# Patient Record
Sex: MALE | Race: BLACK OR AFRICAN AMERICAN | NOT HISPANIC OR LATINO | Employment: FULL TIME | URBAN - METROPOLITAN AREA
[De-identification: names, ages, dates, MRNs, and addresses within clinical notes are randomized per-mention and may not be internally consistent; named-entity substitution may affect disease eponyms.]

---

## 2020-02-07 ENCOUNTER — OFFICE VISIT (OUTPATIENT)
Dept: CARDIOLOGY | Facility: CLINIC | Age: 55
End: 2020-02-07

## 2020-02-07 VITALS
OXYGEN SATURATION: 99 % | DIASTOLIC BLOOD PRESSURE: 70 MMHG | SYSTOLIC BLOOD PRESSURE: 118 MMHG | RESPIRATION RATE: 20 BRPM | HEIGHT: 69 IN | BODY MASS INDEX: 46.65 KG/M2 | WEIGHT: 315 LBS

## 2020-02-07 DIAGNOSIS — I48.0 PAROXYSMAL ATRIAL FIBRILLATION WITH RAPID VENTRICULAR RESPONSE (HCC): ICD-10-CM

## 2020-02-07 DIAGNOSIS — I10 ESSENTIAL HYPERTENSION: ICD-10-CM

## 2020-02-07 DIAGNOSIS — G47.33 OBSTRUCTIVE SLEEP APNEA OF ADULT: ICD-10-CM

## 2020-02-07 DIAGNOSIS — E66.01 MORBIDLY OBESE (HCC): ICD-10-CM

## 2020-02-07 DIAGNOSIS — R06.02 EXERTIONAL SHORTNESS OF BREATH: Primary | ICD-10-CM

## 2020-02-07 PROBLEM — I48.91 ATRIAL FIBRILLATION: Status: ACTIVE | Noted: 2020-02-07

## 2020-02-07 PROBLEM — E66.813 OBESITY, CLASS III, BMI 40-49.9 (MORBID OBESITY): Status: ACTIVE | Noted: 2019-01-22

## 2020-02-07 PROBLEM — J45.909 ASTHMA: Status: ACTIVE | Noted: 2020-02-07

## 2020-02-07 PROBLEM — E78.5 HYPERLIPIDEMIA: Status: ACTIVE | Noted: 2020-02-07

## 2020-02-07 PROBLEM — M17.12 PRIMARY OSTEOARTHRITIS OF LEFT KNEE: Status: ACTIVE | Noted: 2018-10-23

## 2020-02-07 PROBLEM — K21.9 GERD (GASTROESOPHAGEAL REFLUX DISEASE): Status: ACTIVE | Noted: 2020-02-07

## 2020-02-07 PROCEDURE — 99205 OFFICE O/P NEW HI 60 MIN: CPT | Performed by: INTERNAL MEDICINE

## 2020-02-07 PROCEDURE — 93000 ELECTROCARDIOGRAM COMPLETE: CPT | Performed by: INTERNAL MEDICINE

## 2020-02-07 RX ORDER — LISINOPRIL 40 MG/1
40 TABLET ORAL DAILY
COMMUNITY
End: 2020-02-07

## 2020-02-07 RX ORDER — FLECAINIDE ACETATE 100 MG/1
100 TABLET ORAL 2 TIMES DAILY
Qty: 180 TABLET | Refills: 3 | Status: SHIPPED | OUTPATIENT
Start: 2020-02-07 | End: 2020-02-16 | Stop reason: HOSPADM

## 2020-02-07 RX ORDER — FLUTICASONE PROPIONATE 50 MCG
1 SPRAY, SUSPENSION (ML) NASAL DAILY
COMMUNITY
Start: 2020-01-12 | End: 2020-02-16 | Stop reason: HOSPADM

## 2020-02-07 RX ORDER — OXYCODONE AND ACETAMINOPHEN 10; 325 MG/1; MG/1
TABLET ORAL
COMMUNITY
Start: 2019-12-08 | End: 2020-06-10

## 2020-02-07 RX ORDER — MONTELUKAST SODIUM 10 MG/1
10 TABLET ORAL DAILY
COMMUNITY
Start: 2020-01-12 | End: 2020-02-16 | Stop reason: HOSPADM

## 2020-02-07 RX ORDER — CARVEDILOL 12.5 MG/1
12.5 TABLET ORAL
COMMUNITY
End: 2020-02-07

## 2020-02-07 RX ORDER — HYDROCODONE BITARTRATE AND ACETAMINOPHEN 10; 325 MG/1; MG/1
TABLET ORAL
COMMUNITY
Start: 2020-01-08 | End: 2023-01-25 | Stop reason: ALTCHOICE

## 2020-02-07 RX ORDER — METOPROLOL TARTRATE 100 MG/1
100 TABLET ORAL 2 TIMES DAILY
Qty: 180 TABLET | Refills: 3 | Status: SHIPPED | OUTPATIENT
Start: 2020-02-07 | End: 2020-02-16 | Stop reason: HOSPADM

## 2020-02-07 RX ORDER — ATORVASTATIN CALCIUM 40 MG/1
40 TABLET, FILM COATED ORAL NIGHTLY
COMMUNITY

## 2020-02-07 NOTE — PROGRESS NOTES
PATIENTINFORMATION    Date of Office Visit: 2020  Encounter Provider: Gavin Luke MD  Place of Service: [facility] CARDIOLOGY  Patient Name: Milton Moody  : 1965    Subjective:     Encounter Date:2020      Patient ID: Milton Moody is a 54 y.o. male.    Chief Complaint   Patient presents with   • Shortness of Breath   • Irregular Heart Beat   • Atrial Fibrillation     HPI  Mr. Moody is a 54 years old man with past medical history of symptomatic paroxysmal atrial fibrillation diagnosed about 4 years ago.  He used to follow-up at [facility] cardiology.  He has had direct-current cardioversion only once in the past .  He was a started flecainide about 2 years ago.  He reports staying in sinus rhythm for the most part.  He has had multiple episodes of palpitation and atrial fibrillation over the past 4 months.  He admits not being compliant with his medications including flecainide and he was found out to be in A. fib today.   For the past several weeks he has had progressively worsening shortness of breath particularly during exertion and just walking half a block or taking 2 flights of stairs making very short winded and has to stop to take a break.  He admits getting sweaty and very tired.  This happens every time he is in A. fib as well.  He denied any chest discomfort, extremity swelling, orthopnea or PND.  He denied past diagnosis of heart failure or admission for heart failure.  So far he was admitted only one time for atrial fibrillation and no admissions since then.  He denied any prior diabetes, stroke, bleeding from anybody site.  He has been taking lisinopril and Coreg for hypertension as well.  He wears CPAP for obstructive sleep apnea.  He is morbidly obese and he has tried to lose weight is to no avail in the past.  He admits gaining significant amount over the past few months.  He does not regularly exercise.  He  admits to being more  sedentary since he took leave of absence from [facility] because of shoulder injuries several months ago.  He wanted to switch to [facility] because of a longer appointment he was given at [facility] that is not happy about.    ROS   All systems reviewed.  He had right shoulder surgery and knee replacement.  Joint pains intermittently.  Was negative review of systems    Past Medical History:   Diagnosis Date   • Abnormal ECG    • Asthma    • Atrial fibrillation (CMS/HCC)    • Chest pain    • Hyperlipidemia    • Hypertension    • Palpitations    • Rapid heart rate    • Sleep apnea        Past Surgical History:   Procedure Laterality Date   • KNEE SURGERY Left 2019   • SHOULDER ROTATOR CUFF REPAIR Right 01/2020       Social History     Socioeconomic History   • Marital status:      Spouse name: Not on file   • Number of children: Not on file   • Years of education: Not on file   • Highest education level: Not on file   Tobacco Use   • Smoking status: Light Tobacco Smoker     Packs/day: 0.10     Years: 3.00     Pack years: 0.30     Types: Cigarettes     Start date: 2/7/2017   • Smokeless tobacco: Current User     Types: Snuff   Substance and Sexual Activity   • Alcohol use: Yes     Alcohol/week: 12.0 standard drinks     Types: 12 Cans of beer per week     Frequency: 2-4 times a month     Drinks per session: 10 or more     Binge frequency: Monthly   • Drug use: Not Currently     Types: Marijuana   • Sexual activity: Not Currently     Partners: Female       Family History   Problem Relation Age of Onset   • Hypertension Mother    • Hypertension Sister            ECG 12 Lead  Date/Time: 2/7/2020 10:50 AM  Performed by: Gavin Luke MD  Authorized by: Gavin Luke MD   Comparison: compared with previous ECG from 2/6/2020  Similar to previous ECG  Rhythm: sinus rhythm and atrial fibrillation  Conduction: conduction normal  ST Segments: ST segments normal  T Waves: T waves normal  QRS  "axis: normal  Other findings: non-specific ST-T wave changes    Clinical impression: abnormal EKG               Objective:     /70 (BP Location: Left arm, Patient Position: Sitting, Cuff Size: Large Adult)   Resp 20   Ht 175.3 cm (69\")   Wt (!) 165 kg (363 lb 6.4 oz)   SpO2 99%   BMI 53.66 kg/m²  Body mass index is 53.66 kg/m².     Physical Exam   Constitutional: He is oriented to person, place, and time. He appears well-developed and well-nourished. No distress.   Morbidly obese   HENT:   Head: Normocephalic and atraumatic.   Eyes: Pupils are equal, round, and reactive to light. EOM are normal.   Neck: Normal range of motion. Neck supple. No thyromegaly present.   Cardiovascular: Normal heart sounds and intact distal pulses. An irregularly irregular rhythm present. Tachycardia present. Exam reveals no gallop and no friction rub.   No murmur heard.  Pulmonary/Chest: Effort normal and breath sounds normal. No respiratory distress. He has no wheezes. He has no rales. He exhibits no tenderness.   Abdominal: Soft. Bowel sounds are normal. He exhibits no distension. There is no guarding.   Musculoskeletal: Normal range of motion. He exhibits no edema or deformity.   Neurological: He is alert and oriented to person, place, and time. He has normal reflexes. No cranial nerve deficit.   Skin: Skin is warm and dry. No rash noted. He is not diaphoretic.   Psychiatric: He has a normal mood and affect. Judgment normal.       Review Of Data: I have personally obtained and reviewed documents from PCPs office visit and labs      Assessment/Plan:         Exertional shortness of breath    Paroxysmal atrial fibrillation with rapid ventricular response (CMS/HCC)-    Essential hypertension-controlled    Obstructive sleep apnea of adult-on CPAP    Morbidly obese (CMS/HCC)-refer to dietitian    Patient reports having exertional shortness of breath mostly when he is in atrial fibrillation.  His symptoms are probably related to " A. fib with RVR episodes.  He reports having normal stress test in the past.  Will do echocardiogram to evaluate left ventricular ejection fraction and diastolic functions.  Patient counseled to be compliant with medical regimen.  I have refilled flecainide.  Continue Eliquis.   Patient strongly encouraged to walk regularly and increase level of activity.  Refer patient to dietitian.     Diagnosis and plan of care discussed with patient and verbalized understanding.           Gavin Luke MD  02/07/20  10:54 AM

## 2020-02-10 ENCOUNTER — HOSPITAL ENCOUNTER (OUTPATIENT)
Dept: CARDIOLOGY | Facility: HOSPITAL | Age: 55
Discharge: HOME | End: 2020-02-10

## 2020-02-10 ENCOUNTER — HOSPITAL ENCOUNTER (INPATIENT)
Facility: HOSPITAL | Age: 55
LOS: 6 days | Discharge: HOME | End: 2020-02-16
Attending: INTERNAL MEDICINE | Admitting: INTERNAL MEDICINE

## 2020-02-10 VITALS
OXYGEN SATURATION: 97 % | SYSTOLIC BLOOD PRESSURE: 180 MMHG | BODY MASS INDEX: 46.65 KG/M2 | DIASTOLIC BLOOD PRESSURE: 110 MMHG | HEART RATE: 106 BPM | WEIGHT: 315 LBS | HEIGHT: 69 IN

## 2020-02-10 DIAGNOSIS — I48.91 ATRIAL FIBRILLATION WITH RAPID VENTRICULAR RESPONSE (HCC): ICD-10-CM

## 2020-02-10 DIAGNOSIS — R06.02 EXERTIONAL SHORTNESS OF BREATH: ICD-10-CM

## 2020-02-10 DIAGNOSIS — Z79.01 ANTICOAGULATED ON COUMADIN: Primary | ICD-10-CM

## 2020-02-10 LAB
ANION GAP SERPL CALCULATED.3IONS-SCNC: 13.5 MMOL/L (ref 5–15)
AORTIC ROOT ANNULUS: 2.1 CM
APTT PPP: 35.8 SECONDS (ref 22.7–35.4)
APTT PPP: 76.9 SECONDS (ref 22.7–35.4)
ASCENDING AORTA: 3.2 CM
BASOPHILS # BLD AUTO: 0.04 10*3/MM3 (ref 0–0.2)
BASOPHILS NFR BLD AUTO: 0.7 % (ref 0–1.5)
BH CV ECHO MEAS - ACS: 1.9 CM
BH CV ECHO MEAS - AO MAX PG (FULL): 4 MMHG
BH CV ECHO MEAS - AO MAX PG: 5.9 MMHG
BH CV ECHO MEAS - AO MEAN PG (FULL): 2.5 MMHG
BH CV ECHO MEAS - AO MEAN PG: 3.7 MMHG
BH CV ECHO MEAS - AO V2 MAX: 121.6 CM/SEC
BH CV ECHO MEAS - AO V2 MEAN: 93.7 CM/SEC
BH CV ECHO MEAS - AO V2 VTI: 17.1 CM
BH CV ECHO MEAS - ASC AORTA: 3.2 CM
BH CV ECHO MEAS - AVA(I,A): 1.8 CM^2
BH CV ECHO MEAS - AVA(I,D): 1.8 CM^2
BH CV ECHO MEAS - AVA(V,A): 2.1 CM^2
BH CV ECHO MEAS - AVA(V,D): 2.1 CM^2
BH CV ECHO MEAS - BSA(HAYCOCK): 2.9 M^2
BH CV ECHO MEAS - BSA: 2.7 M^2
BH CV ECHO MEAS - BZI_BMI: 53.6 KILOGRAMS/M^2
BH CV ECHO MEAS - BZI_METRIC_HEIGHT: 175.3 CM
BH CV ECHO MEAS - BZI_METRIC_WEIGHT: 164.7 KG
BH CV ECHO MEAS - EDV(MOD-SP2): 76 ML
BH CV ECHO MEAS - EDV(MOD-SP4): 159 ML
BH CV ECHO MEAS - EDV(TEICH): 157.1 ML
BH CV ECHO MEAS - EF(CUBED): 13.3 %
BH CV ECHO MEAS - EF(MOD-BP): 8 %
BH CV ECHO MEAS - EF(MOD-SP2): 6.6 %
BH CV ECHO MEAS - EF(MOD-SP4): 8.8 %
BH CV ECHO MEAS - EF(TEICH): 10.4 %
BH CV ECHO MEAS - ESV(MOD-SP2): 71 ML
BH CV ECHO MEAS - ESV(MOD-SP4): 145 ML
BH CV ECHO MEAS - ESV(TEICH): 140.8 ML
BH CV ECHO MEAS - FS: 4.6 %
BH CV ECHO MEAS - IVS/LVPW: 0.84
BH CV ECHO MEAS - IVSD: 1.1 CM
BH CV ECHO MEAS - LAT PEAK E' VEL: 9 CM/SEC
BH CV ECHO MEAS - LV DIASTOLIC VOL/BSA (35-75): 59.7 ML/M^2
BH CV ECHO MEAS - LV MASS(C)D: 283.2 GRAMS
BH CV ECHO MEAS - LV MASS(C)DI: 106.4 GRAMS/M^2
BH CV ECHO MEAS - LV MAX PG: 1.9 MMHG
BH CV ECHO MEAS - LV MEAN PG: 1.2 MMHG
BH CV ECHO MEAS - LV SYSTOLIC VOL/BSA (12-30): 54.5 ML/M^2
BH CV ECHO MEAS - LV V1 MAX: 69 CM/SEC
BH CV ECHO MEAS - LV V1 MEAN: 53.7 CM/SEC
BH CV ECHO MEAS - LV V1 VTI: 8.2 CM
BH CV ECHO MEAS - LVIDD: 5.7 CM
BH CV ECHO MEAS - LVIDS: 5.4 CM
BH CV ECHO MEAS - LVLD AP2: 6.2 CM
BH CV ECHO MEAS - LVLD AP4: 8.1 CM
BH CV ECHO MEAS - LVLS AP2: 6 CM
BH CV ECHO MEAS - LVLS AP4: 7.6 CM
BH CV ECHO MEAS - LVOT AREA (M): 3.8 CM^2
BH CV ECHO MEAS - LVOT AREA: 3.7 CM^2
BH CV ECHO MEAS - LVOT DIAM: 2.2 CM
BH CV ECHO MEAS - LVPWD: 1.3 CM
BH CV ECHO MEAS - MED PEAK E' VEL: 9 CM/SEC
BH CV ECHO MEAS - MR MAX PG: 16.1 MMHG
BH CV ECHO MEAS - MR MAX VEL: 200.7 CM/SEC
BH CV ECHO MEAS - MV DEC SLOPE: 614.4 CM/SEC^2
BH CV ECHO MEAS - MV DEC TIME: 0.16 SEC
BH CV ECHO MEAS - MV E MAX VEL: 96.4 CM/SEC
BH CV ECHO MEAS - MV MAX PG: 5.9 MMHG
BH CV ECHO MEAS - MV MEAN PG: 2.1 MMHG
BH CV ECHO MEAS - MV P1/2T MAX VEL: 97.7 CM/SEC
BH CV ECHO MEAS - MV P1/2T: 46.6 MSEC
BH CV ECHO MEAS - MV V2 MAX: 121.6 CM/SEC
BH CV ECHO MEAS - MV V2 MEAN: 59.5 CM/SEC
BH CV ECHO MEAS - MV V2 VTI: 11.5 CM
BH CV ECHO MEAS - MVA P1/2T LCG: 2.3 CM^2
BH CV ECHO MEAS - MVA(P1/2T): 4.7 CM^2
BH CV ECHO MEAS - MVA(VTI): 2.6 CM^2
BH CV ECHO MEAS - SI(CUBED): 9 ML/M^2
BH CV ECHO MEAS - SI(LVOT): 11.3 ML/M^2
BH CV ECHO MEAS - SI(MOD-SP2): 1.9 ML/M^2
BH CV ECHO MEAS - SI(MOD-SP4): 5.3 ML/M^2
BH CV ECHO MEAS - SI(TEICH): 6.1 ML/M^2
BH CV ECHO MEAS - SUP REN AO DIAM: 2.1 CM
BH CV ECHO MEAS - SV(CUBED): 24.1 ML
BH CV ECHO MEAS - SV(LVOT): 30.1 ML
BH CV ECHO MEAS - SV(MOD-SP2): 5 ML
BH CV ECHO MEAS - SV(MOD-SP4): 14 ML
BH CV ECHO MEAS - SV(TEICH): 16.3 ML
BH CV ECHO MEASUREMENTS AVERAGE E/E' RATIO: 10.71
BUN BLD-MCNC: 11 MG/DL (ref 6–20)
BUN/CREAT SERPL: 8.8 (ref 7–25)
CALCIUM SPEC-SCNC: 8.9 MG/DL (ref 8.6–10.5)
CHLORIDE SERPL-SCNC: 101 MMOL/L (ref 98–107)
CO2 SERPL-SCNC: 23.5 MMOL/L (ref 22–29)
CREAT BLD-MCNC: 1.25 MG/DL (ref 0.76–1.27)
DEPRECATED RDW RBC AUTO: 42.3 FL (ref 37–54)
EOSINOPHIL # BLD AUTO: 0.08 10*3/MM3 (ref 0–0.4)
EOSINOPHIL NFR BLD AUTO: 1.4 % (ref 0.3–6.2)
ERYTHROCYTE [DISTWIDTH] IN BLOOD BY AUTOMATED COUNT: 14.5 % (ref 12.3–15.4)
GFR SERPL CREATININE-BSD FRML MDRD: 73 ML/MIN/1.73
GLUCOSE BLD-MCNC: 113 MG/DL (ref 65–99)
GLUCOSE BLDC GLUCOMTR-MCNC: 107 MG/DL (ref 70–130)
HCT VFR BLD AUTO: 52.1 % (ref 37.5–51)
HGB BLD-MCNC: 17.9 G/DL (ref 13–17.7)
IMM GRANULOCYTES # BLD AUTO: 0.02 10*3/MM3 (ref 0–0.05)
IMM GRANULOCYTES NFR BLD AUTO: 0.3 % (ref 0–0.5)
INR PPP: 1.24 (ref 0.9–1.1)
LEFT ATRIUM VOLUME INDEX: 24 ML/M2
LYMPHOCYTES # BLD AUTO: 2.02 10*3/MM3 (ref 0.7–3.1)
LYMPHOCYTES NFR BLD AUTO: 34.6 % (ref 19.6–45.3)
MAGNESIUM SERPL-MCNC: 2 MG/DL (ref 1.6–2.6)
MAXIMAL PREDICTED HEART RATE: 167 BPM
MCH RBC QN AUTO: 29.1 PG (ref 26.6–33)
MCHC RBC AUTO-ENTMCNC: 34.4 G/DL (ref 31.5–35.7)
MCV RBC AUTO: 84.6 FL (ref 79–97)
MONOCYTES # BLD AUTO: 0.73 10*3/MM3 (ref 0.1–0.9)
MONOCYTES NFR BLD AUTO: 12.5 % (ref 5–12)
NEUTROPHILS # BLD AUTO: 2.94 10*3/MM3 (ref 1.7–7)
NEUTROPHILS NFR BLD AUTO: 50.5 % (ref 42.7–76)
NRBC BLD AUTO-RTO: 0 /100 WBC (ref 0–0.2)
NT-PROBNP SERPL-MCNC: 656.3 PG/ML (ref 5–900)
PLATELET # BLD AUTO: 282 10*3/MM3 (ref 140–450)
PMV BLD AUTO: 9.8 FL (ref 6–12)
POTASSIUM BLD-SCNC: 4.4 MMOL/L (ref 3.5–5.2)
PROTHROMBIN TIME: 15.3 SECONDS (ref 11.7–14.2)
RBC # BLD AUTO: 6.16 10*6/MM3 (ref 4.14–5.8)
SINUS: 3.3 CM
SODIUM BLD-SCNC: 138 MMOL/L (ref 136–145)
STJ: 2.7 CM
STRESS TARGET HR: 142 BPM
T4 FREE SERPL-MCNC: 1.22 NG/DL (ref 0.93–1.7)
TROPONIN T SERPL-MCNC: <0.01 NG/ML (ref 0–0.03)
TSH SERPL DL<=0.05 MIU/L-ACNC: 2 UIU/ML (ref 0.27–4.2)
WBC NRBC COR # BLD: 5.83 10*3/MM3 (ref 3.4–10.8)

## 2020-02-10 PROCEDURE — 83735 ASSAY OF MAGNESIUM: CPT | Performed by: INTERNAL MEDICINE

## 2020-02-10 PROCEDURE — 84439 ASSAY OF FREE THYROXINE: CPT | Performed by: INTERNAL MEDICINE

## 2020-02-10 PROCEDURE — 93005 ELECTROCARDIOGRAM TRACING: CPT | Performed by: INTERNAL MEDICINE

## 2020-02-10 PROCEDURE — 25010000002 DIGOXIN PER 500 MCG: Performed by: INTERNAL MEDICINE

## 2020-02-10 PROCEDURE — 25010000002 HEPARIN (PORCINE) PER 1000 UNITS: Performed by: INTERNAL MEDICINE

## 2020-02-10 PROCEDURE — 85610 PROTHROMBIN TIME: CPT | Performed by: INTERNAL MEDICINE

## 2020-02-10 PROCEDURE — 99223 1ST HOSP IP/OBS HIGH 75: CPT | Performed by: INTERNAL MEDICINE

## 2020-02-10 PROCEDURE — 25010000002 PERFLUTREN (DEFINITY) 8.476 MG IN SODIUM CHLORIDE 0.9 % 10 ML INJECTION: Performed by: INTERNAL MEDICINE

## 2020-02-10 PROCEDURE — 93010 ELECTROCARDIOGRAM REPORT: CPT | Performed by: INTERNAL MEDICINE

## 2020-02-10 PROCEDURE — 94799 UNLISTED PULMONARY SVC/PX: CPT

## 2020-02-10 PROCEDURE — 82962 GLUCOSE BLOOD TEST: CPT

## 2020-02-10 PROCEDURE — 84484 ASSAY OF TROPONIN QUANT: CPT | Performed by: INTERNAL MEDICINE

## 2020-02-10 PROCEDURE — 85025 COMPLETE CBC W/AUTO DIFF WBC: CPT | Performed by: INTERNAL MEDICINE

## 2020-02-10 PROCEDURE — 93306 TTE W/DOPPLER COMPLETE: CPT

## 2020-02-10 PROCEDURE — 85730 THROMBOPLASTIN TIME PARTIAL: CPT | Performed by: INTERNAL MEDICINE

## 2020-02-10 PROCEDURE — 83880 ASSAY OF NATRIURETIC PEPTIDE: CPT | Performed by: INTERNAL MEDICINE

## 2020-02-10 PROCEDURE — 93306 TTE W/DOPPLER COMPLETE: CPT | Performed by: INTERNAL MEDICINE

## 2020-02-10 PROCEDURE — 84443 ASSAY THYROID STIM HORMONE: CPT | Performed by: INTERNAL MEDICINE

## 2020-02-10 PROCEDURE — 80048 BASIC METABOLIC PNL TOTAL CA: CPT | Performed by: INTERNAL MEDICINE

## 2020-02-10 RX ORDER — ACETAMINOPHEN 650 MG/1
650 SUPPOSITORY RECTAL EVERY 4 HOURS PRN
Status: DISCONTINUED | OUTPATIENT
Start: 2020-02-10 | End: 2020-02-16 | Stop reason: HOSPADM

## 2020-02-10 RX ORDER — ALBUTEROL SULFATE 2.5 MG/3ML
2.5 SOLUTION RESPIRATORY (INHALATION) EVERY 4 HOURS PRN
COMMUNITY
End: 2020-02-16 | Stop reason: HOSPADM

## 2020-02-10 RX ORDER — LISINOPRIL 40 MG/1
40 TABLET ORAL
Status: DISCONTINUED | OUTPATIENT
Start: 2020-02-10 | End: 2020-02-10

## 2020-02-10 RX ORDER — NITROGLYCERIN 0.4 MG/1
0.4 TABLET SUBLINGUAL
Status: DISCONTINUED | OUTPATIENT
Start: 2020-02-10 | End: 2020-02-16 | Stop reason: HOSPADM

## 2020-02-10 RX ORDER — HEPARIN SODIUM 10000 [USP'U]/100ML
6.21 INJECTION, SOLUTION INTRAVENOUS
Status: DISCONTINUED | OUTPATIENT
Start: 2020-02-10 | End: 2020-02-15

## 2020-02-10 RX ORDER — MELOXICAM 15 MG/1
15 TABLET ORAL DAILY
COMMUNITY
End: 2020-02-16 | Stop reason: HOSPADM

## 2020-02-10 RX ORDER — RANITIDINE 300 MG/1
300 TABLET ORAL NIGHTLY
COMMUNITY
End: 2020-02-16 | Stop reason: HOSPADM

## 2020-02-10 RX ORDER — WARFARIN SODIUM 5 MG/1
5 TABLET ORAL
Status: DISCONTINUED | OUTPATIENT
Start: 2020-02-10 | End: 2020-02-11 | Stop reason: DRUGHIGH

## 2020-02-10 RX ORDER — SODIUM CHLORIDE 0.9 % (FLUSH) 0.9 %
10 SYRINGE (ML) INJECTION AS NEEDED
Status: DISCONTINUED | OUTPATIENT
Start: 2020-02-10 | End: 2020-02-16

## 2020-02-10 RX ORDER — DIGOXIN 0.25 MG/ML
500 INJECTION INTRAMUSCULAR; INTRAVENOUS ONCE
Status: COMPLETED | OUTPATIENT
Start: 2020-02-10 | End: 2020-02-10

## 2020-02-10 RX ORDER — ACETAMINOPHEN 160 MG/5ML
650 SOLUTION ORAL EVERY 4 HOURS PRN
Status: DISCONTINUED | OUTPATIENT
Start: 2020-02-10 | End: 2020-02-16 | Stop reason: HOSPADM

## 2020-02-10 RX ORDER — CHLORTHALIDONE 25 MG/1
25 TABLET ORAL DAILY
Status: DISCONTINUED | OUTPATIENT
Start: 2020-02-10 | End: 2020-02-10

## 2020-02-10 RX ORDER — SODIUM CHLORIDE 0.9 % (FLUSH) 0.9 %
10 SYRINGE (ML) INJECTION EVERY 12 HOURS SCHEDULED
Status: DISCONTINUED | OUTPATIENT
Start: 2020-02-10 | End: 2020-02-16

## 2020-02-10 RX ORDER — METOPROLOL TARTRATE 50 MG/1
50 TABLET, FILM COATED ORAL EVERY 6 HOURS SCHEDULED
Status: DISCONTINUED | OUTPATIENT
Start: 2020-02-10 | End: 2020-02-10

## 2020-02-10 RX ORDER — LISINOPRIL 40 MG/1
40 TABLET ORAL DAILY
COMMUNITY
End: 2020-02-16 | Stop reason: HOSPADM

## 2020-02-10 RX ORDER — LISINOPRIL 10 MG/1
10 TABLET ORAL
Status: DISCONTINUED | OUTPATIENT
Start: 2020-02-11 | End: 2020-02-12

## 2020-02-10 RX ORDER — CHLORAL HYDRATE 500 MG
1000 CAPSULE ORAL
COMMUNITY
End: 2020-02-16 | Stop reason: HOSPADM

## 2020-02-10 RX ORDER — CYCLOBENZAPRINE HCL 10 MG
10 TABLET ORAL EVERY 8 HOURS PRN
COMMUNITY
End: 2020-02-16 | Stop reason: HOSPADM

## 2020-02-10 RX ORDER — ACETAMINOPHEN 325 MG/1
650 TABLET ORAL EVERY 4 HOURS PRN
Status: DISCONTINUED | OUTPATIENT
Start: 2020-02-10 | End: 2020-02-16 | Stop reason: HOSPADM

## 2020-02-10 RX ORDER — ASPIRIN 325 MG
325 TABLET ORAL DAILY
COMMUNITY
End: 2020-02-16 | Stop reason: HOSPADM

## 2020-02-10 RX ADMIN — ACETAMINOPHEN 650 MG: 325 TABLET, FILM COATED ORAL at 11:17

## 2020-02-10 RX ADMIN — METOPROLOL TARTRATE 25 MG: 25 TABLET ORAL at 23:08

## 2020-02-10 RX ADMIN — SODIUM CHLORIDE, PRESERVATIVE FREE 10 ML: 5 INJECTION INTRAVENOUS at 20:38

## 2020-02-10 RX ADMIN — DIGOXIN 500 MCG: 0.25 INJECTION INTRAMUSCULAR; INTRAVENOUS at 11:09

## 2020-02-10 RX ADMIN — ACETAMINOPHEN 650 MG: 325 TABLET, FILM COATED ORAL at 18:53

## 2020-02-10 RX ADMIN — METOPROLOL TARTRATE 25 MG: 25 TABLET ORAL at 18:53

## 2020-02-10 RX ADMIN — LISINOPRIL 40 MG: 40 TABLET ORAL at 13:02

## 2020-02-10 RX ADMIN — SODIUM CHLORIDE, PRESERVATIVE FREE 10 ML: 5 INJECTION INTRAVENOUS at 11:09

## 2020-02-10 RX ADMIN — HEPARIN SODIUM 12 UNITS/KG/HR: 10000 INJECTION, SOLUTION INTRAVENOUS at 12:54

## 2020-02-10 RX ADMIN — PERFLUTREN 1.5 ML: 6.52 INJECTION, SUSPENSION INTRAVENOUS at 07:45

## 2020-02-10 RX ADMIN — WARFARIN 5 MG: 5 TABLET ORAL at 18:53

## 2020-02-10 RX ADMIN — METOPROLOL TARTRATE 50 MG: 50 TABLET, FILM COATED ORAL at 13:02

## 2020-02-10 NOTE — H&P
Date of Hospital Visit: 2/10/2020  Date of consult:   Encounter Provider: Gavin Luke MD  Place of Service: [facility] CARDIOLOGY  Patient Name: Milton Moody  :1965  Referral Provider: No ref. provider found    Chief complaint: palpitations, AF with RVR    History of Present Illness: Mr Moody is a 54 year old male patient with history of symptomatic PAF, diagnosed 4 years ago. He was previously followed by a cardiologist at [facility], but recently switched to our clinic and was seen 20. Upon arrival to clinic, he reported multiple episodes of palpitations over the previous 4 months, and admitted to noncompliance with medications. EKG obtained in office noted atrial fibrillation and an echocardiogram was ordered and performed today noting severely decreased LVSF with EF 8%. He was directly admitted for further evaluation and testing.   Patient reports having progressively worsening severe shortness of breath particularly during exertion and he could barely carry out even house chores without having shortness of breath and stopping.  He also feels clammy and sweaty even with minimal exertion with some chest tightness from time to time.  He denied any significant orthopnea, PND, significant new extremity swelling.  He was prescribed metoprolol for A. fib with RVR 3 days ago and he stopped taking after only 1 dose because it made him feel sick.  He denied presyncope or syncope.    Echocardiogram 2/10/20  · Left ventricular systolic function is severely decreased.Calculated EF = 8%. Estimated EF appears to be in the range of less than 20%.  · Left ventricular diastolic dysfunction is noted. Normal left atrial pressure.  · No significant valvular stenosis or regurgitation noted.  · Patient was in atrial fibrillation with rapid ventricular response during study.    Past Medical History:   Diagnosis Date   • Abnormal ECG    • Asthma    • Atrial  fibrillation (CMS/HCC)    • Chest pain    • Hyperlipidemia    • Hypertension    • Palpitations    • Rapid heart rate    • Sleep apnea        Past Surgical History:   Procedure Laterality Date   • KNEE SURGERY Left 2019   • SHOULDER ROTATOR CUFF REPAIR Right 01/2020       Medications Prior to Admission   Medication Sig Dispense Refill Last Dose   • albuterol (PROVENTIL) (2.5 MG/3ML) 0.083% nebulizer solution Take 2.5 mg by nebulization Every 4 (Four) Hours As Needed for Wheezing or Shortness of Air.      • apixaban (ELIQUIS) 5 MG tablet tablet Take 5 mg by mouth Every 12 (Twelve) Hours.   2/9/2020 at 2100   • aspirin 325 MG tablet Take 325 mg by mouth Daily.   2/9/2020 at 0900   • atorvastatin (LIPITOR) 40 MG tablet Take 40 mg by mouth Every Night.   2/9/2020 at 2100   • cyclobenzaprine (FLEXERIL) 10 MG tablet Take 10 mg by mouth Every 8 (Eight) Hours As Needed for Muscle Spasms.      • flecainide (TAMBOCOR) 100 MG tablet Take 1 tablet by mouth 2 (Two) Times a Day. 180 tablet 3 2/9/2020 at 2100   • fluticasone (FLONASE) 50 MCG/ACT nasal spray 1 spray into the nostril(s) as directed by provider Daily.   2/9/2020 at Unknown time   • HYDROcodone-acetaminophen (NORCO)  MG per tablet TK 1 T PO  Q 12 H PRN P   Taking   • lisinopril (PRINIVIL,ZESTRIL) 40 MG tablet Take 40 mg by mouth Daily.   2/9/2020 at 0900   • meloxicam (MOBIC) 15 MG tablet Take 15 mg by mouth Daily.   2/9/2020 at 0900   • montelukast (SINGULAIR) 10 MG tablet Take 10 mg by mouth Daily.   2/9/2020 at 0900   • Omega-3 Fatty Acids (FISH OIL) 1000 MG capsule capsule Take 1,000 mg by mouth Daily With Breakfast.   2/9/2020 at 0900   • oxyCODONE-acetaminophen (PERCOCET)  MG per tablet    Taking   • Potassium 99 MG tablet Take 2 tablets by mouth Daily.   2/9/2020 at 0900   • raNITIdine (ZANTAC) 300 MG tablet Take 300 mg by mouth Every Night.   2/9/2020 at 2100   • vitamin E 200 UNIT capsule Take 400 Units by mouth Daily.   2/9/2020 at 0900   •  metoprolol tartrate (LOPRESSOR) 100 MG tablet Take 1 tablet by mouth 2 (Two) Times a Day. 180 tablet 3 2/7/2020 at 2100   • MULTIPLE VITAMIN PO Take 1 tablet by mouth Daily.   Taking       Current Meds  Scheduled Meds:    chlorthalidone 25 mg Oral Daily   lisinopril 40 mg Oral Q24H   metoprolol tartrate 50 mg Oral Q6H   sodium chloride 10 mL Intravenous Q12H   warfarin 5 mg Oral Daily     Continuous Infusions:    heparin (porcine) 6.21 Units/kg/hr Last Rate: 12 Units/kg/hr (02/10/20 1254)   Pharmacy to dose warfarin       PRN Meds:.•  acetaminophen **OR** acetaminophen **OR** acetaminophen  •  nitroglycerin  •  Pharmacy to dose warfarin  •  sodium chloride    Allergies as of 02/10/2020 - Reviewed 02/10/2020   Allergen Reaction Noted   • Seasonal ic [cholestatin] Unknown - Low Severity 02/10/2020       Social History     Socioeconomic History   • Marital status:      Spouse name: Not on file   • Number of children: Not on file   • Years of education: Not on file   • Highest education level: Not on file   Tobacco Use   • Smoking status: Light Tobacco Smoker     Packs/day: 0.10     Years: 3.00     Pack years: 0.30     Types: Cigarettes     Start date: 2/7/2017   • Smokeless tobacco: Current User     Types: Snuff   Substance and Sexual Activity   • Alcohol use: Yes     Alcohol/week: 6.0 standard drinks     Types: 6 Cans of beer per week     Frequency: 2-4 times a month     Drinks per session: 10 or more     Binge frequency: Monthly     Comment: weekend alcohol use   • Drug use: Not Currently     Types: Marijuana   • Sexual activity: Not Currently     Partners: Female       Family History   Problem Relation Age of Onset   • Hypertension Mother    • Hypertension Sister        REVIEW OF SYSTEMS:   All systems reviewed and negative except as noted in HPI.       Objective:   Temp:  [97.3 °F (36.3 °C)] 97.3 °F (36.3 °C)  Heart Rate:  [100-106] 100  Resp:  [18] 18  BP: (129-180)/(101-110) 129/101  Body mass index is  "52.28 kg/m².  Flowsheet Rows      First Filed Value   Admission Height  175.3 cm (69\") Documented at 02/10/2020 0944   Admission Weight  (!) 157 kg (346 lb 8 oz) Documented at 02/10/2020 0944        Vitals:    02/10/20 0944   BP: (!) 129/101   Pulse: 100   Resp: 18   Temp: 97.3 °F (36.3 °C)       General Appearance:    Alert, cooperative, mild distress, morbidly obese   Head:    Normocephalic, without obvious abnormality, atraumatic   Eyes:            Lids and lashes normal, conjunctivae and sclerae normal, no   icterus, no pallor, corneas clear, PERRLA   Ears:    Ears appear intact with no abnormalities noted   Throat:   No oral lesions, no thrush, oral mucosa moist   Neck:   No adenopathy, supple, trachea midline, no thyromegaly, no   carotid bruit, no JVD   Back:     No kyphosis present, no scoliosis present, no skin lesions, erythema or scars, no tenderness to percussion or palpation, range of motion normal   Lungs:     Clear to auscultation,respirations regular, even and unlabored    Heart:   Tachycardic and irregularly irregular, normal S1 and S2, no murmur, no gallop, no rub, no click   Chest Wall:    No abnormalities observed   Abdomen:     Normal bowel sounds, no masses, no organomegaly, soft        non-tender, non-distended, no guarding, no rebound  tenderness   Extremities:   Moves all extremities well, no edema, no cyanosis, no redness   Pulses:   Pulses palpable and equal bilaterally. Normal radial, carotid, femoral, dorsalis pedis and posterior tibial pulses bilaterally. Normal abdominal aorta   Skin:  Neurology:   Psychiatric:   No bleeding, bruising or rash   Normal speech and cranial nerve exam, no focal deficit   Alert and oriented x 3, normal mood and affect                 Review of Data:      Results from last 7 days   Lab Units 02/10/20  1025   SODIUM mmol/L 138   POTASSIUM mmol/L 4.4   CHLORIDE mmol/L 101   CO2 mmol/L 23.5   BUN mg/dL 11   CREATININE mg/dL 1.25   CALCIUM mg/dL 8.9   GLUCOSE " mg/dL 113*     Results from last 7 days   Lab Units 02/10/20  1025   TROPONIN T ng/mL <0.010     @LABRCNTbnp@  Results from last 7 days   Lab Units 02/10/20  1025   WBC 10*3/mm3 5.83   HEMOGLOBIN g/dL 17.9*   HEMATOCRIT % 52.1*   PLATELETS 10*3/mm3 282     Results from last 7 days   Lab Units 02/10/20  1025   INR  1.24*   APTT seconds 35.8*     Results from last 7 days   Lab Units 02/10/20  1025   MAGNESIUM mg/dL 2.0     @LABRCNTIP(chol,trig,hdl,ldl)        I personally viewed and interpreted the patient's EKG/Telemetry data  )  Patient Active Problem List   Diagnosis   • Paroxysmal atrial fibrillation with rapid ventricular response (CMS/HCC)   • Asthma   • GERD (gastroesophageal reflux disease)   • Hyperlipidemia   • Hypertension   • Morbidly obese (CMS/HCC)   • Obstructive sleep apnea of adult   • Primary osteoarthritis of left knee   • Atrial fibrillation with rapid ventricular response (CMS/HCC)     Assessment and Plan:    1.  Paroxysmal atrial fibrillation with rapid ventricular response  2.  Newly diagnosed severe systolic left ventricular dysfunction-probably from tachycardia induced cardiomyopathy  3.  Morbidly obese- BMI 52  4.  Hypertension uncontrolled  5.  Obstructive sleep apnea  6.  Medication noncompliance    Echocardiogram today revealed left ventricular ejection fraction of less than 20% with severe global hypokinesis.  Was technically difficult because of body habitus to evaluate right ventricle.  Patient is still in A. fib with rapid ventricular response.  His systolic blood pressure still exceeds 190.  Continue lisinopril 40 mg daily.  Start metoprolol 50mg q 6h .  Start anticoagulation with bridging heparin and Coumadin.  Consider KONRAD guided cardioversion tomorrow.  DC flecainide because of severe cardiomyopathy.      Gavin Luke MD  02/10/20  12:57 PM.  Time spent in reviewing chart, discussion and examination:

## 2020-02-10 NOTE — PROGRESS NOTES
Pharmacy Consult: Warfarin Dosing/ Monitoring    Milton Moody is a 54 y.o. male, estimated creatinine clearance is 103.4 mL/min (by C-G formula based on SCr of 1.25 mg/dL). weighing (!) 161 kg (354 lb).     has a past medical history of Abnormal ECG, Asthma, Atrial fibrillation (CMS/HCC), Chest pain, Hyperlipidemia, Hypertension, Palpitations, Rapid heart rate, and Sleep apnea.    Social History     Tobacco Use    Smoking status: Light Tobacco Smoker     Packs/day: 0.10     Years: 3.00     Pack years: 0.30     Types: Cigarettes     Start date: 2/7/2017    Smokeless tobacco: Current User     Types: Snuff   Substance Use Topics    Alcohol use: Yes     Alcohol/week: 6.0 standard drinks     Types: 6 Cans of beer per week     Frequency: 2-4 times a month     Drinks per session: 10 or more     Binge frequency: Monthly     Comment: weekend alcohol use    Drug use: Not Currently     Types: Marijuana       Results from last 7 days   Lab Units 02/10/20  1025   INR  1.24*   APTT seconds 35.8*   HEMOGLOBIN g/dL 17.9*   HEMATOCRIT % 52.1*   PLATELETS 10*3/mm3 282     Results from last 7 days   Lab Units 02/10/20  1025   SODIUM mmol/L 138   POTASSIUM mmol/L 4.4   CHLORIDE mmol/L 101   CO2 mmol/L 23.5   BUN mg/dL 11   CREATININE mg/dL 1.25   CALCIUM mg/dL 8.9   GLUCOSE mg/dL 113*     Anticoagulation history: home med: apixaban 5mg po q12h    Hospital Anticoagulation:  Consulting provider: Dr. Savage  Start date: 2/10/20  Indication: A fib  Target INR: 2-3  Bridge Therapy: Yes              and unfractionated heparin  Date 2/10            INR 1.24            Warfarin dose 5mg              Potential drug interactions:     Assessment/Plan:  Will start warfarin 5mg po daily and check daily protime/INR    Pharmacy will continue to follow until discharge or discontinuation of warfarin.     Ebony Mendoza RPH  2/10/2020

## 2020-02-11 ENCOUNTER — ANESTHESIA (OUTPATIENT)
Dept: POSTOP/PACU | Facility: HOSPITAL | Age: 55
End: 2020-02-11

## 2020-02-11 ENCOUNTER — APPOINTMENT (OUTPATIENT)
Dept: POSTOP/PACU | Facility: HOSPITAL | Age: 55
End: 2020-02-11

## 2020-02-11 ENCOUNTER — ANESTHESIA EVENT (OUTPATIENT)
Dept: POSTOP/PACU | Facility: HOSPITAL | Age: 55
End: 2020-02-11

## 2020-02-11 VITALS — DIASTOLIC BLOOD PRESSURE: 31 MMHG | OXYGEN SATURATION: 100 % | SYSTOLIC BLOOD PRESSURE: 75 MMHG

## 2020-02-11 LAB
ANION GAP SERPL CALCULATED.3IONS-SCNC: 11.6 MMOL/L (ref 5–15)
APTT PPP: 70.7 SECONDS (ref 22.7–35.4)
APTT PPP: 86.5 SECONDS (ref 22.7–35.4)
BASOPHILS # BLD AUTO: 0.06 10*3/MM3 (ref 0–0.2)
BASOPHILS NFR BLD AUTO: 0.9 % (ref 0–1.5)
BH CV ECHO MEAS - BSA(HAYCOCK): 2.9 M^2
BH CV ECHO MEAS - BSA: 2.6 M^2
BH CV ECHO MEAS - BZI_BMI: 52.3 KILOGRAMS/M^2
BH CV ECHO MEAS - BZI_METRIC_HEIGHT: 175.3 CM
BH CV ECHO MEAS - BZI_METRIC_WEIGHT: 160.6 KG
BH CV VAS BP LEFT ARM: NORMAL MMHG
BUN BLD-MCNC: 13 MG/DL (ref 6–20)
BUN/CREAT SERPL: 9.7 (ref 7–25)
CALCIUM SPEC-SCNC: 8.8 MG/DL (ref 8.6–10.5)
CHLORIDE SERPL-SCNC: 101 MMOL/L (ref 98–107)
CHOLEST SERPL-MCNC: 97 MG/DL (ref 0–200)
CO2 SERPL-SCNC: 25.4 MMOL/L (ref 22–29)
CREAT BLD-MCNC: 1.34 MG/DL (ref 0.76–1.27)
DEPRECATED RDW RBC AUTO: 41.2 FL (ref 37–54)
EOSINOPHIL # BLD AUTO: 0.08 10*3/MM3 (ref 0–0.4)
EOSINOPHIL NFR BLD AUTO: 1.2 % (ref 0.3–6.2)
ERYTHROCYTE [DISTWIDTH] IN BLOOD BY AUTOMATED COUNT: 14.2 % (ref 12.3–15.4)
GFR SERPL CREATININE-BSD FRML MDRD: 68 ML/MIN/1.73
GLUCOSE BLD-MCNC: 89 MG/DL (ref 65–99)
HCT VFR BLD AUTO: 52.4 % (ref 37.5–51)
HDLC SERPL-MCNC: 37 MG/DL (ref 40–60)
HGB BLD-MCNC: 17.8 G/DL (ref 13–17.7)
IMM GRANULOCYTES # BLD AUTO: 0.02 10*3/MM3 (ref 0–0.05)
IMM GRANULOCYTES NFR BLD AUTO: 0.3 % (ref 0–0.5)
INR PPP: 1.15 (ref 0.9–1.1)
LDLC SERPL CALC-MCNC: 45 MG/DL (ref 0–100)
LDLC/HDLC SERPL: 1.21 {RATIO}
LYMPHOCYTES # BLD AUTO: 2.51 10*3/MM3 (ref 0.7–3.1)
LYMPHOCYTES NFR BLD AUTO: 38.6 % (ref 19.6–45.3)
MAGNESIUM SERPL-MCNC: 2.1 MG/DL (ref 1.6–2.6)
MCH RBC QN AUTO: 28.4 PG (ref 26.6–33)
MCHC RBC AUTO-ENTMCNC: 34 G/DL (ref 31.5–35.7)
MCV RBC AUTO: 83.6 FL (ref 79–97)
MONOCYTES # BLD AUTO: 0.76 10*3/MM3 (ref 0.1–0.9)
MONOCYTES NFR BLD AUTO: 11.7 % (ref 5–12)
NEUTROPHILS # BLD AUTO: 3.07 10*3/MM3 (ref 1.7–7)
NEUTROPHILS NFR BLD AUTO: 47.3 % (ref 42.7–76)
NRBC BLD AUTO-RTO: 0.2 /100 WBC (ref 0–0.2)
PLATELET # BLD AUTO: 264 10*3/MM3 (ref 140–450)
PMV BLD AUTO: 9.8 FL (ref 6–12)
POTASSIUM BLD-SCNC: 4.6 MMOL/L (ref 3.5–5.2)
PROTHROMBIN TIME: 14.4 SECONDS (ref 11.7–14.2)
RBC # BLD AUTO: 6.27 10*6/MM3 (ref 4.14–5.8)
SODIUM BLD-SCNC: 138 MMOL/L (ref 136–145)
TRIGL SERPL-MCNC: 76 MG/DL (ref 0–150)
VLDLC SERPL-MCNC: 15.2 MG/DL (ref 5–40)
WBC NRBC COR # BLD: 6.5 10*3/MM3 (ref 3.4–10.8)

## 2020-02-11 PROCEDURE — 93321 DOPPLER ECHO F-UP/LMTD STD: CPT | Performed by: INTERNAL MEDICINE

## 2020-02-11 PROCEDURE — 93325 DOPPLER ECHO COLOR FLOW MAPG: CPT | Performed by: INTERNAL MEDICINE

## 2020-02-11 PROCEDURE — 25010000002 PHENYLEPHRINE PER 1 ML: Performed by: NURSE ANESTHETIST, CERTIFIED REGISTERED

## 2020-02-11 PROCEDURE — 85730 THROMBOPLASTIN TIME PARTIAL: CPT | Performed by: INTERNAL MEDICINE

## 2020-02-11 PROCEDURE — 93312 ECHO TRANSESOPHAGEAL: CPT

## 2020-02-11 PROCEDURE — 25010000002 PROPOFOL 10 MG/ML EMULSION: Performed by: NURSE ANESTHETIST, CERTIFIED REGISTERED

## 2020-02-11 PROCEDURE — 92960 CARDIOVERSION ELECTRIC EXT: CPT

## 2020-02-11 PROCEDURE — 93005 ELECTROCARDIOGRAM TRACING: CPT | Performed by: INTERNAL MEDICINE

## 2020-02-11 PROCEDURE — 93312 ECHO TRANSESOPHAGEAL: CPT | Performed by: INTERNAL MEDICINE

## 2020-02-11 PROCEDURE — 83735 ASSAY OF MAGNESIUM: CPT | Performed by: INTERNAL MEDICINE

## 2020-02-11 PROCEDURE — 25010000002 HEPARIN (PORCINE) PER 1000 UNITS: Performed by: INTERNAL MEDICINE

## 2020-02-11 PROCEDURE — 85025 COMPLETE CBC W/AUTO DIFF WBC: CPT | Performed by: INTERNAL MEDICINE

## 2020-02-11 PROCEDURE — 93321 DOPPLER ECHO F-UP/LMTD STD: CPT

## 2020-02-11 PROCEDURE — 93325 DOPPLER ECHO COLOR FLOW MAPG: CPT

## 2020-02-11 PROCEDURE — 80061 LIPID PANEL: CPT | Performed by: INTERNAL MEDICINE

## 2020-02-11 PROCEDURE — 80048 BASIC METABOLIC PNL TOTAL CA: CPT | Performed by: INTERNAL MEDICINE

## 2020-02-11 PROCEDURE — 85610 PROTHROMBIN TIME: CPT | Performed by: INTERNAL MEDICINE

## 2020-02-11 PROCEDURE — 99233 SBSQ HOSP IP/OBS HIGH 50: CPT | Performed by: INTERNAL MEDICINE

## 2020-02-11 PROCEDURE — 25010000002 MIDAZOLAM PER 1 MG: Performed by: NURSE ANESTHETIST, CERTIFIED REGISTERED

## 2020-02-11 PROCEDURE — 93010 ELECTROCARDIOGRAM REPORT: CPT | Performed by: INTERNAL MEDICINE

## 2020-02-11 RX ORDER — PROPOFOL 10 MG/ML
VIAL (ML) INTRAVENOUS AS NEEDED
Status: DISCONTINUED | OUTPATIENT
Start: 2020-02-11 | End: 2020-02-11 | Stop reason: SURG

## 2020-02-11 RX ORDER — MIDAZOLAM HYDROCHLORIDE 1 MG/ML
INJECTION INTRAMUSCULAR; INTRAVENOUS AS NEEDED
Status: DISCONTINUED | OUTPATIENT
Start: 2020-02-11 | End: 2020-02-11 | Stop reason: SURG

## 2020-02-11 RX ORDER — KETAMINE HYDROCHLORIDE 10 MG/ML
INJECTION INTRAMUSCULAR; INTRAVENOUS AS NEEDED
Status: DISCONTINUED | OUTPATIENT
Start: 2020-02-11 | End: 2020-02-11 | Stop reason: SURG

## 2020-02-11 RX ORDER — SODIUM CHLORIDE, SODIUM LACTATE, POTASSIUM CHLORIDE, CALCIUM CHLORIDE 600; 310; 30; 20 MG/100ML; MG/100ML; MG/100ML; MG/100ML
9 INJECTION, SOLUTION INTRAVENOUS CONTINUOUS
Status: DISCONTINUED | OUTPATIENT
Start: 2020-02-11 | End: 2020-02-16 | Stop reason: HOSPADM

## 2020-02-11 RX ORDER — LIDOCAINE HYDROCHLORIDE 10 MG/ML
0.5 INJECTION, SOLUTION EPIDURAL; INFILTRATION; INTRACAUDAL; PERINEURAL ONCE AS NEEDED
Status: DISCONTINUED | OUTPATIENT
Start: 2020-02-11 | End: 2020-02-16 | Stop reason: HOSPADM

## 2020-02-11 RX ORDER — FAMOTIDINE 10 MG/ML
20 INJECTION, SOLUTION INTRAVENOUS ONCE
Status: DISCONTINUED | OUTPATIENT
Start: 2020-02-11 | End: 2020-02-16 | Stop reason: HOSPADM

## 2020-02-11 RX ORDER — GLYCOPYRROLATE 0.2 MG/ML
INJECTION INTRAMUSCULAR; INTRAVENOUS AS NEEDED
Status: DISCONTINUED | OUTPATIENT
Start: 2020-02-11 | End: 2020-02-11 | Stop reason: SURG

## 2020-02-11 RX ORDER — CARVEDILOL 6.25 MG/1
6.25 TABLET ORAL 2 TIMES DAILY WITH MEALS
Status: DISCONTINUED | OUTPATIENT
Start: 2020-02-11 | End: 2020-02-13

## 2020-02-11 RX ORDER — SODIUM CHLORIDE 0.9 % (FLUSH) 0.9 %
3-10 SYRINGE (ML) INJECTION AS NEEDED
Status: DISCONTINUED | OUTPATIENT
Start: 2020-02-11 | End: 2020-02-16 | Stop reason: HOSPADM

## 2020-02-11 RX ORDER — WARFARIN SODIUM 7.5 MG/1
7.5 TABLET ORAL
Status: DISCONTINUED | OUTPATIENT
Start: 2020-02-11 | End: 2020-02-15

## 2020-02-11 RX ORDER — SODIUM CHLORIDE 0.9 % (FLUSH) 0.9 %
3 SYRINGE (ML) INJECTION EVERY 12 HOURS SCHEDULED
Status: DISCONTINUED | OUTPATIENT
Start: 2020-02-11 | End: 2020-02-16

## 2020-02-11 RX ORDER — SODIUM CHLORIDE, SODIUM LACTATE, POTASSIUM CHLORIDE, CALCIUM CHLORIDE 600; 310; 30; 20 MG/100ML; MG/100ML; MG/100ML; MG/100ML
INJECTION, SOLUTION INTRAVENOUS CONTINUOUS PRN
Status: DISCONTINUED | OUTPATIENT
Start: 2020-02-11 | End: 2020-02-11 | Stop reason: SURG

## 2020-02-11 RX ADMIN — PROPOFOL 20 MG: 10 INJECTION, EMULSION INTRAVENOUS at 08:02

## 2020-02-11 RX ADMIN — METOPROLOL TARTRATE 25 MG: 25 TABLET ORAL at 05:08

## 2020-02-11 RX ADMIN — PROPOFOL 20 MG: 10 INJECTION, EMULSION INTRAVENOUS at 07:59

## 2020-02-11 RX ADMIN — METOPROLOL TARTRATE 25 MG: 25 TABLET ORAL at 11:47

## 2020-02-11 RX ADMIN — WARFARIN SODIUM 7.5 MG: 7.5 TABLET ORAL at 18:22

## 2020-02-11 RX ADMIN — MIDAZOLAM 2 MG: 1 INJECTION INTRAMUSCULAR; INTRAVENOUS at 07:56

## 2020-02-11 RX ADMIN — ACETAMINOPHEN 650 MG: 325 TABLET, FILM COATED ORAL at 18:22

## 2020-02-11 RX ADMIN — PROPOFOL 20 MG: 10 INJECTION, EMULSION INTRAVENOUS at 08:03

## 2020-02-11 RX ADMIN — ACETAMINOPHEN 650 MG: 325 TABLET, FILM COATED ORAL at 11:45

## 2020-02-11 RX ADMIN — HEPARIN SODIUM 12 UNITS/KG/HR: 10000 INJECTION, SOLUTION INTRAVENOUS at 01:45

## 2020-02-11 RX ADMIN — SODIUM CHLORIDE, POTASSIUM CHLORIDE, SODIUM LACTATE AND CALCIUM CHLORIDE: 600; 310; 30; 20 INJECTION, SOLUTION INTRAVENOUS at 07:50

## 2020-02-11 RX ADMIN — PHENYLEPHRINE HYDROCHLORIDE 200 MCG: 10 INJECTION INTRAVENOUS at 07:59

## 2020-02-11 RX ADMIN — SODIUM CHLORIDE, PRESERVATIVE FREE 10 ML: 5 INJECTION INTRAVENOUS at 21:17

## 2020-02-11 RX ADMIN — HEPARIN SODIUM 12 UNITS/KG/HR: 10000 INJECTION, SOLUTION INTRAVENOUS at 10:13

## 2020-02-11 RX ADMIN — PROPOFOL 20 MG: 10 INJECTION, EMULSION INTRAVENOUS at 08:01

## 2020-02-11 RX ADMIN — PROPOFOL 20 MG: 10 INJECTION, EMULSION INTRAVENOUS at 08:00

## 2020-02-11 RX ADMIN — PHENYLEPHRINE HYDROCHLORIDE 100 MCG: 10 INJECTION INTRAVENOUS at 08:10

## 2020-02-11 RX ADMIN — HEPARIN SODIUM 12 UNITS/KG/HR: 10000 INJECTION, SOLUTION INTRAVENOUS at 18:27

## 2020-02-11 RX ADMIN — CARVEDILOL 6.25 MG: 6.25 TABLET, FILM COATED ORAL at 19:13

## 2020-02-11 RX ADMIN — PHENYLEPHRINE HYDROCHLORIDE 100 MCG: 10 INJECTION INTRAVENOUS at 08:30

## 2020-02-11 RX ADMIN — KETAMINE HYDROCHLORIDE 20 MG: 10 INJECTION INTRAMUSCULAR; INTRAVENOUS at 07:59

## 2020-02-11 RX ADMIN — GLYCOPYRROLATE 0.2 MCG: 0.2 INJECTION INTRAMUSCULAR; INTRAVENOUS at 07:56

## 2020-02-11 NOTE — PROGRESS NOTES
Discharge Planning Assessment  [facility]     Patient Name: Milton Moody  MRN: [id number]  Today's Date: 2/11/2020    Admit Date: 2/10/2020    Discharge Needs Assessment     Row Name 02/11/20 1530       Living Environment    Lives With  spouse    Current Living Arrangements  home/apartment/condo    Primary Care Provided by  self    Family Caregiver if Needed  spouse    Quality of Family Relationships  helpful;involved;supportive    Able to Return to Prior Arrangements  yes       Resource/Environmental Concerns    Resource/Environmental Concerns  none       Transition Planning    Patient/Family Anticipates Transition to  home with family    Patient/Family Anticipated Services at Transition  none    Transportation Anticipated  family or friend will provide;car, drives self       Discharge Needs Assessment    Readmission Within the Last 30 Days  no previous admission in last 30 days    Concerns to be Addressed  denies needs/concerns at this time    Equipment Currently Used at Home  bipap/cpap    Provided post acute provider list?  N/A        Discharge Plan     Row Name 02/11/20 153       Plan    Plan  Home    Plan Comments  Spoke with pt and his wife Erlinda at bedside. Facesheet and pharmacy info confirmed.  Pt lives in a house with his wife, is IADLs and can drive.  His only home DME is a CPAP.  He has had HH in the past, after knee surgery.  No hx of SNF.  Pt has enrolled in [facility] to [facility].  He plans to return home with his wife upon DC.   CCP will continue to follow and assist as needed.                   Demographic Summary     Row Name 02/11/20 1529       General Information    Admission Type  inpatient    Arrived From  home    Referral Source  admission list    Reason for Consult  discharge planning    Preferred Language  English        Functional Status     Row Name 02/11/20 1530       Functional Status    Usual Activity Tolerance  good    Current Activity Tolerance  good       Functional Status, IADL     Medications  independent;assistive person    Meal Preparation  independent;assistive person    Housekeeping  independent;assistive person    Laundry  independent;assistive person    Shopping  independent;assistive person       Mental Status    General Appearance WDL  WDL       Mental Status Summary    Recent Changes in Mental Status/Cognitive Functioning  no changes       Employment/    Employment Status  employed full time            Marielle Fiedls RN

## 2020-02-11 NOTE — PLAN OF CARE
Problem: Patient Care Overview  Goal: Plan of Care Review  Flowsheets (Taken 2/11/2020 0453)  Progress: no change  Plan of Care Reviewed With: patient  Outcome Summary: VSS. Patient continues to experience severe SOA upon minimal exertion such as walking to and from the restroom, even breaking out in a sweat each time and taking minutes to catch his breath. Remains on a Heparin gtt. PTT overnight therapeutic, recheck for the morning. Has been NPO since midnight for KONRAD and cardioversion today. Will continue to monitor.     Problem: Arrhythmia/Dysrhythmia (Symptomatic) (Adult)  Goal: Signs and Symptoms of Listed Potential Problems Will be Absent, Minimized or Managed (Arrhythmia/Dysrhythmia)  Flowsheets (Taken 2/11/2020 0453)  Problems Assessed (Arrhythmia/Dysrhythmia): situational response  Problems Present (Dysrhythmia): situational response     Problem: Breathing Pattern Ineffective (Adult)  Goal: Identify Related Risk Factors and Signs and Symptoms  Flowsheets (Taken 2/11/2020 0453)  Related Risk Factors (Breathing Pattern Ineffective): fatigue; obesity; underlying condition  Signs and Symptoms (Breathing Pattern Ineffective): breathlessness; activity intolerance     Problem: Breathing Pattern Ineffective (Adult)  Goal: Effective Oxygenation/Ventilation  Flowsheets (Taken 2/11/2020 0453)  Effective Oxygenation/Ventilation: making progress toward outcome     Problem: Breathing Pattern Ineffective (Adult)  Goal: Anxiety/Fear Reduction  Flowsheets (Taken 2/11/2020 0453)  Anxiety/Fear Reduction: making progress toward outcome

## 2020-02-11 NOTE — PROGRESS NOTES
Pharmacy Consult: Warfarin Dosing/ Monitoring    Milton Moody is a 54 y.o. male, estimated creatinine clearance is 96.5 mL/min (A) (by C-G formula based on SCr of 1.34 mg/dL (H)). weighing (!) 161 kg (354 lb).     has a past medical history of Abnormal ECG, Asthma, Atrial fibrillation (CMS/HCC), Chest pain, Hyperlipidemia, Hypertension, Palpitations, Rapid heart rate, and Sleep apnea.    Social History     Tobacco Use    Smoking status: Light Tobacco Smoker     Packs/day: 0.10     Years: 3.00     Pack years: 0.30     Types: Cigarettes     Start date: 2/7/2017    Smokeless tobacco: Current User     Types: Snuff   Substance Use Topics    Alcohol use: Yes     Alcohol/week: 6.0 standard drinks     Types: 6 Cans of beer per week     Frequency: 2-4 times a month     Drinks per session: 10 or more     Binge frequency: Monthly     Comment: weekend alcohol use    Drug use: Not Currently     Types: Marijuana       Results from last 7 days   Lab Units 02/11/20  1048 02/11/20  0559 02/10/20  1917 02/10/20  1025   INR   --  1.15*  --  1.24*   APTT seconds 70.7* 86.5* 76.9* 35.8*   HEMOGLOBIN g/dL  --  17.8*  --  17.9*   HEMATOCRIT %  --  52.4*  --  52.1*   PLATELETS 10*3/mm3  --  264  --  282     Results from last 7 days   Lab Units 02/11/20  0559 02/10/20  1025   SODIUM mmol/L 138 138   POTASSIUM mmol/L 4.6 4.4   CHLORIDE mmol/L 101 101   CO2 mmol/L 25.4 23.5   BUN mg/dL 13 11   CREATININE mg/dL 1.34* 1.25   CALCIUM mg/dL 8.8 8.9   GLUCOSE mg/dL 89 113*     Anticoagulation history: home med: apixaban 5mg po q12h    Hospital Anticoagulation:  Consulting provider: Dr. Savage  Start date: 2/10/20  Indication: A fib  Target INR: 2-3  Bridge Therapy: Yes              and unfractionated heparin  Date 2/10 2/11           INR 1.24 1.15           Warfarin dose 5mg 7.5mg             Potential drug interactions:   Not with current medications    Assessment/Plan:  Will increase warfarin to 7.5mg po daily and check daily  protime/INR    Pharmacy will continue to follow until discharge or discontinuation of warfarin.     Ebony Mendoza RPH  2/11/2020

## 2020-02-11 NOTE — PLAN OF CARE
"  Problem: Patient Care Overview  Goal: Plan of Care Review  Outcome: Ongoing (interventions implemented as appropriate)  Flowsheets (Taken 2/11/2020 4234)  Plan of Care Reviewed With: patient; spouse  Note:   KONRAD completed this am, see report: \"small mobile thrombus/sludge on left atrial appendage; moderate TVR\". Unable to proceed with cardioversion. PTT 70.7, Heparin gtt continues @ 12 units/kg/hr. INR 1.15, to receive 7.5mg Coumadin today. CTM closely.      "

## 2020-02-11 NOTE — PROGRESS NOTES
"CC: Severe cardiomyopathy, atrial fibrillation.    Interval History: No acute significant events overnight.  Patient had KONRAD today.      Vital Signs  Temp:  [97.3 °F (36.3 °C)-98 °F (36.7 °C)] 98 °F (36.7 °C)  Heart Rate:  [67-88] 72  Resp:  [16-20] 18  BP: ()/() 124/98    Intake/Output Summary (Last 24 hours) at 2/11/2020 1636  Last data filed at 2/11/2020 0827  Gross per 24 hour   Intake 250 ml   Output --   Net 250 ml     Flowsheet Rows      First Filed Value   Admission Height  175.3 cm (69\") Documented at 02/10/2020 0944   Admission Weight  (!) 157 kg (346 lb 8 oz) Documented at 02/10/2020 0944          PHYSICAL EXAM:  General: Mild distress, morbidly obese  Resp: Mildly tachypneic, symmetric chest expansion,unlabored, clear  CV: Normal rate but irregular, NL PMI, NL S1 and S2, no Murmur, no gallop, no rub, No JVD.   ABD:Nl sounds, no masses or tenderness, nondistended, no guarding or rebound  Neuro: alert,cooperative and oriented  Extr:Normal pedal pulses, trace pedal edema, moves all extremities      Results Review:    Results from last 7 days   Lab Units 02/11/20  0559   SODIUM mmol/L 138   POTASSIUM mmol/L 4.6   CHLORIDE mmol/L 101   CO2 mmol/L 25.4   BUN mg/dL 13   CREATININE mg/dL 1.34*   GLUCOSE mg/dL 89   CALCIUM mg/dL 8.8     Results from last 7 days   Lab Units 02/10/20  1025   TROPONIN T ng/mL <0.010     Results from last 7 days   Lab Units 02/11/20  0559   WBC 10*3/mm3 6.50   HEMOGLOBIN g/dL 17.8*   HEMATOCRIT % 52.4*   PLATELETS 10*3/mm3 264     Results from last 7 days   Lab Units 02/11/20  1048 02/11/20  0559 02/10/20  1917 02/10/20  1025   INR   --  1.15*  --  1.24*   APTT seconds 70.7* 86.5* 76.9* 35.8*     Results from last 7 days   Lab Units 02/11/20  0559   CHOLESTEROL mg/dL 97     Results from last 7 days   Lab Units 02/11/20  0559   MAGNESIUM mg/dL 2.1     Results from last 7 days   Lab Units 02/11/20  0559   CHOLESTEROL mg/dL 97   TRIGLYCERIDES mg/dL 76   HDL CHOL mg/dL 37* "   LDL CHOL mg/dL 45     I reviewed the patient's new clinical results.  I personally viewed and interpreted the patient's EKG/Telemetry data, labs reviewed        Medication Review:   Meds reviewed      heparin (porcine) 6.21 Units/kg/hr Last Rate: 12 Units/kg/hr (02/11/20 1013)   lactated ringers 9 mL/hr    Pharmacy to dose warfarin         Assessment/Plan     1.   Persistent atrial fibrillation with rapid ventricular response  -Heart rate controlled now on metoprolol  -May try Coreg as he also has severe cardiomyopathy  -[id number] score of 2   -KONRAD done today and there is left atrial appendage thrombus/sludge- cardioversion deferred  -Started on heparin drip with Coumadin.  Continue Coumadin until INR is therapeutic.    2.  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  3.  Morbidly obese- BMI 52  -Consulted nutrition services  -Need referral to bariatric surgery as well.  4.  Hypertension  -Significantly improved blood pressure control with lisinopril and metoprolol suggesting probable medication noncompliance with patient.  5.  Obstructive sleep apnea  -Continue home CPAP  6.  Medication noncompliance  -Patient counseled    7. Stage 2 CKD   -monitor Cr          Gavin Luke MD  02/11/20  4:36 PM

## 2020-02-11 NOTE — CONSULTS
"Adult Nutrition  Assessment/PES    Patient Name:  Milton Moody  YOB: 1965  MRN: [id number]  Admit Date:  2/10/2020    Assessment Date:  2/11/2020    Comments:  Consult for diet education per patient and wife request for HHD, coumadin, and weight loss.  Patient currently NPO d/t plans for KONRAD and cardioversion today.    Will follow up to educate patient tomorrow once diet has been advanced.    Reason for Assessment     Row Name 02/11/20 0841          Reason for Assessment    Reason For Assessment  nurse/nurse practitioner consult     Diagnosis  cardiac disease;pulmonary disease PAF, asthma, HLD, HTN, sleep apnea; adm with Afib     Identified At Risk by Screening Criteria  need for education         Nutrition/Diet History     Row Name 02/11/20 0841          Nutrition/Diet History    Typical Food/Fluid Intake  currently NPO         Anthropometrics     Row Name 02/11/20 0842 02/11/20 0705       Anthropometrics    Height  --  175.3 cm (69\")    Weight  --  (!) 161 kg (354 lb)       Admit Weight    Admit Weight  -- 354# 2/11  --       Ideal Body Weight (IBW)    Ideal Body Weight (IBW) (kg)  --  73.69    % Ideal Body Weight  --  217.92       Body Mass Index (BMI)    BMI (kg/m2)  --  52.39    BMI Assessment  BMI 40 or greater: obesity grade III  --        Labs/Tests/Procedures/Meds     Row Name 02/11/20 0842          Labs/Procedures/Meds    Lab Results Reviewed  reviewed, pertinent     Lab Results Comments  Creat, Hgb, Hct        Diagnostic Tests/Procedures    Diagnostic Test/Procedure Reviewed  reviewed, pertinent     Diagnostic Test/Procedures Comments  KONRAD and cardioversion today        Medications    Pertinent Medications Reviewed  reviewed, pertinent     Pertinent Medications Comments  coumadin, heparin drip         Physical Findings     Row Name 02/11/20 0842          Physical Findings    Overall Physical Appearance  obese     Skin  edema B=20, intact         Estimated/Assessed Needs     Row Name " "02/11/20 0705          Calculation Measurements    Height  175.3 cm (69\")         Nutrition Prescription Ordered     Row Name 02/11/20 0843          Nutrition Prescription PO    Current PO Diet  NPO         Evaluation of Received Nutrient/Fluid Intake     Row Name 02/11/20 0843          PO Evaluation    Number of Meals  1     % PO Intake  100         Problem/Interventions:  Problem 1     Row Name 02/11/20 0843          Nutrition Diagnoses Problem 1    Problem 1  Knowledge Deficit     Etiology (related to)  Medical Diagnosis     Cardiac  HTN;PAF     Pulmonary/Critical Care  Obstructive sleep apnea;Asthma     Signs/Symptoms (evidenced by)  Reported  Information Deficit         Intervention Goal     Row Name 02/11/20 0844          Intervention Goal    General  Maintain nutrition;Reduce/improve symptoms;Disease management/therapy     PO  Initiate feeding     Weight  Appropriate weight loss         Nutrition Intervention     Row Name 02/11/20 0844          Nutrition Intervention    RD/Tech Action  Follow Tx progress;Care plan reviewd;Await begin PO         Education/Evaluation     Row Name 02/11/20 0844          Education    Education  Will Instruct as appropriate will provide education once diet advanced        Monitor/Evaluation    Monitor  I&O;Per protocol;Pertinent labs;Weight;Symptoms     Education Follow-up  Reinforce PRN           Electronically signed by:  Wanda Nava RD  02/11/20 8:44 AM  "

## 2020-02-11 NOTE — ANESTHESIA PREPROCEDURE EVALUATION
Anesthesia Evaluation     Patient summary reviewed and Nursing notes reviewed   NPO Solid Status: > 8 hours  NPO Liquid Status: > 8 hours           Airway   Mallampati: II  TM distance: >3 FB  Neck ROM: full  no difficulty expected  Dental - normal exam     Pulmonary - normal exam   (+) asthma,sleep apnea,   Cardiovascular - normal exam    (+) hypertension, dysrhythmias Atrial Fib, hyperlipidemia,       Neuro/Psych  GI/Hepatic/Renal/Endo    (+) obesity, morbid obesity, GERD,      Musculoskeletal     Abdominal  - normal exam   Substance History      OB/GYN          Other                        Anesthesia Plan    ASA 4     MAC     intravenous induction     Anesthetic plan, all risks, benefits, and alternatives have been provided, discussed and informed consent has been obtained with: patient.

## 2020-02-11 NOTE — PROGRESS NOTES
Pharmacy Services-Warfarin Counseling    Milton Moody is a new start to warfarin for atrial fibrillation. Was on Eliquis previously, but provider switched to warfarin. Counseling points included the followin) Warfarin's indication, patient's need for the medication, and dosing/frequency  2) Explained the meaning for INR, how INR is monitored, the patient's goal INR range  2) Stressed the importance of taking warfarin at the same time every day, preferably in the evening so that the provider managing his warfarin can make dose adjustments more easily following subsequent visits  3) Explained possible side effects of warfarin therapy, including increased risk of bleeding, s/sx of bleeding, and increase in cold intolerance. Also talked about ways to control bleeding for minor cuts and scrapes.  4) Emphasized the importance of going to the emergency room if any of the following occur: Falling and hitting your head; noticing bright red blood in urine or dark/tarry stools; vomiting up blood or vomit has a coffee-ground like texture; coughing up blood  5) Discussed the importance of informing any physician or dentist that they have been started on warfarin, in case they need to be taken off for a procedure.  6) Discussed all important drug interactions, including over-the-counter medications and supplements.  7) Instructed the patient not to begin or discontinue any medications without informing his physician/pharmacist.    Patient expressed understanding of the information provided and has no additional questions at this time.    Taylor Tellez, Pharm.D., [facility]   Clinical Staff Pharmacist

## 2020-02-11 NOTE — ANESTHESIA POSTPROCEDURE EVALUATION
"Patient: Milton Moody    Procedure Summary     Date:  02/11/20 Room / Location:  [facility] PACU    Anesthesia Start:  0749 Anesthesia Stop:  0833    Procedures:       ADULT TRANSESOPHAGEAL ECHO (KONRAD) W/ CONT IF NECESSARY PER PROTOCOL      CARDIOVERSION EXTERNAL IN CARDIOLOGY DEPARTMENT Diagnosis:       (Cardiac Source of Emboli)      (AF)    Scheduled Providers:  Gavin Luke MD Provider:  Juan Diego Bean MD    Anesthesia Type:  MAC ASA Status:  4          Anesthesia Type: MAC    Vitals  Vitals Value Taken Time   /56 2/11/2020  9:55 AM   Temp     Pulse 68 2/11/2020 10:00 AM   Resp 16 2/11/2020  9:45 AM   SpO2 97 % 2/11/2020 10:00 AM   Vitals shown include unvalidated device data.        Post Anesthesia Care and Evaluation    Patient location during evaluation: bedside  Patient participation: complete - patient participated  Level of consciousness: awake and alert  Pain management: adequate  Airway patency: patent  Anesthetic complications: No anesthetic complications    Cardiovascular status: acceptable  Respiratory status: acceptable  Hydration status: acceptable    Comments: /56   Pulse 67   Temp 36.7 °C (98 °F) (Oral)   Resp 16   Ht 175.3 cm (69\")   Wt (!) 161 kg (354 lb)   SpO2 99%   BMI 52.28 kg/m²       "

## 2020-02-12 LAB
ANION GAP SERPL CALCULATED.3IONS-SCNC: 14 MMOL/L (ref 5–15)
APTT PPP: 82.8 SECONDS (ref 22.7–35.4)
BASOPHILS # BLD AUTO: 0.04 10*3/MM3 (ref 0–0.2)
BASOPHILS NFR BLD AUTO: 0.7 % (ref 0–1.5)
BUN BLD-MCNC: 14 MG/DL (ref 6–20)
BUN/CREAT SERPL: 10 (ref 7–25)
CALCIUM SPEC-SCNC: 9.1 MG/DL (ref 8.6–10.5)
CHLORIDE SERPL-SCNC: 99 MMOL/L (ref 98–107)
CO2 SERPL-SCNC: 25 MMOL/L (ref 22–29)
CREAT BLD-MCNC: 1.4 MG/DL (ref 0.76–1.27)
DEPRECATED RDW RBC AUTO: 40.5 FL (ref 37–54)
EOSINOPHIL # BLD AUTO: 0.1 10*3/MM3 (ref 0–0.4)
EOSINOPHIL NFR BLD AUTO: 1.8 % (ref 0.3–6.2)
ERYTHROCYTE [DISTWIDTH] IN BLOOD BY AUTOMATED COUNT: 13.5 % (ref 12.3–15.4)
GFR SERPL CREATININE-BSD FRML MDRD: 64 ML/MIN/1.73
GLUCOSE BLD-MCNC: 102 MG/DL (ref 65–99)
HCT VFR BLD AUTO: 52.2 % (ref 37.5–51)
HGB BLD-MCNC: 17.5 G/DL (ref 13–17.7)
IMM GRANULOCYTES # BLD AUTO: 0.03 10*3/MM3 (ref 0–0.05)
IMM GRANULOCYTES NFR BLD AUTO: 0.5 % (ref 0–0.5)
INR PPP: 1.14 (ref 0.9–1.1)
LYMPHOCYTES # BLD AUTO: 2.44 10*3/MM3 (ref 0.7–3.1)
LYMPHOCYTES NFR BLD AUTO: 43 % (ref 19.6–45.3)
MCH RBC QN AUTO: 28.1 PG (ref 26.6–33)
MCHC RBC AUTO-ENTMCNC: 33.5 G/DL (ref 31.5–35.7)
MCV RBC AUTO: 83.9 FL (ref 79–97)
MONOCYTES # BLD AUTO: 0.74 10*3/MM3 (ref 0.1–0.9)
MONOCYTES NFR BLD AUTO: 13 % (ref 5–12)
NEUTROPHILS # BLD AUTO: 2.33 10*3/MM3 (ref 1.7–7)
NEUTROPHILS NFR BLD AUTO: 41 % (ref 42.7–76)
NRBC BLD AUTO-RTO: 0.2 /100 WBC (ref 0–0.2)
PLATELET # BLD AUTO: 262 10*3/MM3 (ref 140–450)
PMV BLD AUTO: 9.9 FL (ref 6–12)
POTASSIUM BLD-SCNC: 4.3 MMOL/L (ref 3.5–5.2)
PROTHROMBIN TIME: 14.3 SECONDS (ref 11.7–14.2)
RBC # BLD AUTO: 6.22 10*6/MM3 (ref 4.14–5.8)
SODIUM BLD-SCNC: 138 MMOL/L (ref 136–145)
WBC NRBC COR # BLD: 5.68 10*3/MM3 (ref 3.4–10.8)

## 2020-02-12 PROCEDURE — 80048 BASIC METABOLIC PNL TOTAL CA: CPT | Performed by: INTERNAL MEDICINE

## 2020-02-12 PROCEDURE — 85025 COMPLETE CBC W/AUTO DIFF WBC: CPT | Performed by: INTERNAL MEDICINE

## 2020-02-12 PROCEDURE — 99233 SBSQ HOSP IP/OBS HIGH 50: CPT | Performed by: INTERNAL MEDICINE

## 2020-02-12 PROCEDURE — 85730 THROMBOPLASTIN TIME PARTIAL: CPT | Performed by: INTERNAL MEDICINE

## 2020-02-12 PROCEDURE — 85610 PROTHROMBIN TIME: CPT | Performed by: INTERNAL MEDICINE

## 2020-02-12 PROCEDURE — 25010000002 HEPARIN (PORCINE) PER 1000 UNITS: Performed by: INTERNAL MEDICINE

## 2020-02-12 RX ORDER — LISINOPRIL 5 MG/1
5 TABLET ORAL
Status: DISCONTINUED | OUTPATIENT
Start: 2020-02-12 | End: 2020-02-16 | Stop reason: HOSPADM

## 2020-02-12 RX ADMIN — HEPARIN SODIUM 12 UNITS/KG/HR: 10000 INJECTION, SOLUTION INTRAVENOUS at 19:22

## 2020-02-12 RX ADMIN — HEPARIN SODIUM 12 UNITS/KG/HR: 10000 INJECTION, SOLUTION INTRAVENOUS at 07:28

## 2020-02-12 RX ADMIN — CARVEDILOL 6.25 MG: 6.25 TABLET, FILM COATED ORAL at 08:13

## 2020-02-12 RX ADMIN — SODIUM CHLORIDE, PRESERVATIVE FREE 10 ML: 5 INJECTION INTRAVENOUS at 08:13

## 2020-02-12 RX ADMIN — CARVEDILOL 6.25 MG: 6.25 TABLET, FILM COATED ORAL at 22:02

## 2020-02-12 RX ADMIN — ACETAMINOPHEN 650 MG: 325 TABLET, FILM COATED ORAL at 08:19

## 2020-02-12 RX ADMIN — LISINOPRIL 5 MG: 10 TABLET ORAL at 08:13

## 2020-02-12 RX ADMIN — SODIUM CHLORIDE, PRESERVATIVE FREE 10 ML: 5 INJECTION INTRAVENOUS at 20:04

## 2020-02-12 RX ADMIN — WARFARIN SODIUM 7.5 MG: 7.5 TABLET ORAL at 17:09

## 2020-02-12 RX ADMIN — SODIUM CHLORIDE, PRESERVATIVE FREE 3 ML: 5 INJECTION INTRAVENOUS at 08:14

## 2020-02-12 RX ADMIN — HEPARIN SODIUM 12 UNITS/KG/HR: 10000 INJECTION, SOLUTION INTRAVENOUS at 20:03

## 2020-02-12 NOTE — PLAN OF CARE
Problem: Patient Care Overview  Goal: Plan of Care Review  Outcome: Ongoing (interventions implemented as appropriate)  Flowsheets (Taken 2/12/2020 0865)  Progress: improving  Plan of Care Reviewed With: patient; spouse  Outcome Summary: VSS. Heparin gtt infusing at 12 units/kg/hr. PTT therapeutic range this shift, recheck in AM. Coumadin bridge, INR still not therapeutic. In AFib, rate controlled. Up ambulating in halls today, no SOA reported or observed. Less diaphoretic this shift. Received education on HH Diet.  Goal: Individualization and Mutuality  Outcome: Ongoing (interventions implemented as appropriate)  Goal: Discharge Needs Assessment  Outcome: Ongoing (interventions implemented as appropriate)  Goal: Interprofessional Rounds/Family Conf  Outcome: Ongoing (interventions implemented as appropriate)     Problem: Arrhythmia/Dysrhythmia (Symptomatic) (Adult)  Goal: Signs and Symptoms of Listed Potential Problems Will be Absent, Minimized or Managed (Arrhythmia/Dysrhythmia)  Outcome: Ongoing (interventions implemented as appropriate)     Problem: Breathing Pattern Ineffective (Adult)  Goal: Identify Related Risk Factors and Signs and Symptoms  Outcome: Outcome(s) achieved  Goal: Effective Oxygenation/Ventilation  Outcome: Outcome(s) achieved  Goal: Anxiety/Fear Reduction  Outcome: Outcome(s) achieved

## 2020-02-12 NOTE — PLAN OF CARE
Problem: Patient Care Overview  Goal: Plan of Care Review  Outcome: Ongoing (interventions implemented as appropriate)  Flowsheets (Taken 2/12/2020 8604)  Progress: improving  Plan of Care Reviewed With: patient  Outcome Summary: VSS. Patient remains on the Heparin gtt at 12 units/kg/hr. PTT check for the morning. Bridging with Coumadin. Awaiting INR to be therapeutic. Remains in A.fib, rate controlled in the 70's- 90's. Will continue to monitor.     Problem: Arrhythmia/Dysrhythmia (Symptomatic) (Adult)  Goal: Signs and Symptoms of Listed Potential Problems Will be Absent, Minimized or Managed (Arrhythmia/Dysrhythmia)  Outcome: Ongoing (interventions implemented as appropriate)     Problem: Breathing Pattern Ineffective (Adult)  Goal: Identify Related Risk Factors and Signs and Symptoms  Outcome: Ongoing (interventions implemented as appropriate)

## 2020-02-12 NOTE — PROGRESS NOTES
Nutrition Services    Patient Name:  Milton Moody  YOB: 1965  MRN: [id number]  Admit Date:  2/10/2020    Nutrition follow up for diet education.  Discussed heart healthy diet with patient and wife at bedside.  Discussed foods to choose/avoid, heart healthy cooking methods, no salt seasonings, etc.  Also discussed diet with coumadin and portion control for weight loss.  Gave handouts for home use.  Patient very engaged in education.      RD to continue to follow as needed.    Electronically signed by:  Wanda Nava RD  02/12/20 3:53 PM

## 2020-02-12 NOTE — PROGRESS NOTES
Pharmacy Consult: Warfarin Dosing/ Monitoring    Milton Moody is a 54 y.o. male, estimated creatinine clearance is 91.5 mL/min (A) (by C-G formula based on SCr of 1.4 mg/dL (H)). weighing (!) 159 kg (351 lb 6.4 oz).     has a past medical history of Abnormal ECG, Asthma, Atrial fibrillation (CMS/HCC), Chest pain, Hyperlipidemia, Hypertension, Palpitations, Rapid heart rate, and Sleep apnea.    Social History     Tobacco Use   • Smoking status: Light Tobacco Smoker     Packs/day: 0.10     Years: 3.00     Pack years: 0.30     Types: Cigarettes     Start date: 2/7/2017   • Smokeless tobacco: Current User     Types: Snuff   Substance Use Topics   • Alcohol use: Yes     Alcohol/week: 6.0 standard drinks     Types: 6 Cans of beer per week     Frequency: 2-4 times a month     Drinks per session: 10 or more     Binge frequency: Monthly     Comment: weekend alcohol use   • Drug use: Not Currently     Types: Marijuana       Results from last 7 days   Lab Units 02/12/20  0617 02/11/20  1048 02/11/20  0559 02/10/20  1917 02/10/20  1025   INR  1.14*  --  1.15*  --  1.24*   APTT seconds 82.8* 70.7* 86.5* 76.9* 35.8*   HEMOGLOBIN g/dL 17.5  --  17.8*  --  17.9*   HEMATOCRIT % 52.2*  --  52.4*  --  52.1*   PLATELETS 10*3/mm3 262  --  264  --  282     Results from last 7 days   Lab Units 02/12/20  0617 02/11/20  0559 02/10/20  1025   SODIUM mmol/L 138 138 138   POTASSIUM mmol/L 4.3 4.6 4.4   CHLORIDE mmol/L 99 101 101   CO2 mmol/L 25.0 25.4 23.5   BUN mg/dL 14 13 11   CREATININE mg/dL 1.40* 1.34* 1.25   CALCIUM mg/dL 9.1 8.8 8.9   GLUCOSE mg/dL 102* 89 113*     Anticoagulation history: home med: apixaban 5mg po q12h     Hospital Anticoagulation:  Consulting provider: Dr. Savage  Start date: 2/10/20  Indication: A fib  Target INR: 2-3  Expected duration: indefinite   Bridge Therapy: Yes              and unfractionated heparin  Date 2/10 2/11 2/12          INR 1.24 1.15 1.14          Warfarin dose 5mg 7.5mg 7.5mg             Potential drug interactions:    Relevant nutrition status: reg cardiac diet    Other:     Education complete?/ Date: yes, 2/11    Assessment/Plan:  Dose: continue 7.5mg daily  Monitor INR daily  Follow up daily    Pharmacy will continue to follow until discharge or discontinuation of warfarin.   Artem French PharmD

## 2020-02-13 LAB
APTT PPP: 102.9 SECONDS (ref 22.7–35.4)
APTT PPP: 62.6 SECONDS (ref 22.7–35.4)
APTT PPP: 82 SECONDS (ref 22.7–35.4)
BASOPHILS # BLD AUTO: 0.07 10*3/MM3 (ref 0–0.2)
BASOPHILS NFR BLD AUTO: 1.1 % (ref 0–1.5)
DEPRECATED RDW RBC AUTO: 41.4 FL (ref 37–54)
EOSINOPHIL # BLD AUTO: 0.11 10*3/MM3 (ref 0–0.4)
EOSINOPHIL NFR BLD AUTO: 1.7 % (ref 0.3–6.2)
ERYTHROCYTE [DISTWIDTH] IN BLOOD BY AUTOMATED COUNT: 14.8 % (ref 12.3–15.4)
HCT VFR BLD AUTO: 55 % (ref 37.5–51)
HGB BLD-MCNC: 18.6 G/DL (ref 13–17.7)
IMM GRANULOCYTES # BLD AUTO: 0.02 10*3/MM3 (ref 0–0.05)
IMM GRANULOCYTES NFR BLD AUTO: 0.3 % (ref 0–0.5)
INR PPP: 1.32 (ref 0.9–1.1)
LYMPHOCYTES # BLD AUTO: 2.91 10*3/MM3 (ref 0.7–3.1)
LYMPHOCYTES NFR BLD AUTO: 45.5 % (ref 19.6–45.3)
MCH RBC QN AUTO: 28.6 PG (ref 26.6–33)
MCHC RBC AUTO-ENTMCNC: 33.8 G/DL (ref 31.5–35.7)
MCV RBC AUTO: 84.5 FL (ref 79–97)
MONOCYTES # BLD AUTO: 0.78 10*3/MM3 (ref 0.1–0.9)
MONOCYTES NFR BLD AUTO: 12.2 % (ref 5–12)
NEUTROPHILS # BLD AUTO: 2.51 10*3/MM3 (ref 1.7–7)
NEUTROPHILS NFR BLD AUTO: 39.2 % (ref 42.7–76)
NRBC BLD AUTO-RTO: 0 /100 WBC (ref 0–0.2)
PLATELET # BLD AUTO: 282 10*3/MM3 (ref 140–450)
PMV BLD AUTO: 9.9 FL (ref 6–12)
PROTHROMBIN TIME: 16.1 SECONDS (ref 11.7–14.2)
RBC # BLD AUTO: 6.51 10*6/MM3 (ref 4.14–5.8)
WBC NRBC COR # BLD: 6.4 10*3/MM3 (ref 3.4–10.8)

## 2020-02-13 PROCEDURE — 25010000002 HEPARIN (PORCINE) PER 1000 UNITS: Performed by: INTERNAL MEDICINE

## 2020-02-13 PROCEDURE — 99233 SBSQ HOSP IP/OBS HIGH 50: CPT | Performed by: INTERNAL MEDICINE

## 2020-02-13 PROCEDURE — 85610 PROTHROMBIN TIME: CPT | Performed by: INTERNAL MEDICINE

## 2020-02-13 PROCEDURE — 85025 COMPLETE CBC W/AUTO DIFF WBC: CPT | Performed by: INTERNAL MEDICINE

## 2020-02-13 PROCEDURE — 85730 THROMBOPLASTIN TIME PARTIAL: CPT | Performed by: INTERNAL MEDICINE

## 2020-02-13 RX ADMIN — HEPARIN SODIUM 10 UNITS/KG/HR: 10000 INJECTION, SOLUTION INTRAVENOUS at 11:54

## 2020-02-13 RX ADMIN — SODIUM CHLORIDE, PRESERVATIVE FREE 10 ML: 5 INJECTION INTRAVENOUS at 20:24

## 2020-02-13 RX ADMIN — CARVEDILOL 18.75 MG: 12.5 TABLET, FILM COATED ORAL at 10:11

## 2020-02-13 RX ADMIN — CARVEDILOL 18.75 MG: 12.5 TABLET, FILM COATED ORAL at 17:56

## 2020-02-13 RX ADMIN — HEPARIN SODIUM 11 UNITS/KG/HR: 10000 INJECTION, SOLUTION INTRAVENOUS at 14:39

## 2020-02-13 RX ADMIN — SODIUM CHLORIDE, PRESERVATIVE FREE 10 ML: 5 INJECTION INTRAVENOUS at 09:08

## 2020-02-13 RX ADMIN — WARFARIN SODIUM 7.5 MG: 7.5 TABLET ORAL at 17:56

## 2020-02-13 RX ADMIN — LISINOPRIL 5 MG: 10 TABLET ORAL at 09:07

## 2020-02-13 NOTE — PLAN OF CARE
Problem: Patient Care Overview  Goal: Plan of Care Review  Outcome: Ongoing (interventions implemented as appropriate)  Flowsheets (Taken 2/13/2020 2685)  Progress: no change  Plan of Care Reviewed With: patient  Outcome Summary: VSS. Heparin gtt continues. PTT check for the morning. Waiting for INR to be therapeutic. Remains in A.fib, rate controlled. Will continue to monitor.     Problem: Arrhythmia/Dysrhythmia (Symptomatic) (Adult)  Goal: Signs and Symptoms of Listed Potential Problems Will be Absent, Minimized or Managed (Arrhythmia/Dysrhythmia)  Outcome: Ongoing (interventions implemented as appropriate)

## 2020-02-13 NOTE — PROGRESS NOTES
Continued Stay Note  [facility]     Patient Name: Milton Moody  MRN: [id number]  Today's Date: 2/13/2020    Admit Date: 2/10/2020    Discharge Plan     Row Name 02/13/20 1420       Plan    Plan  Home    Plan Comments  Spoke with pt at bedside who confirms plan to return home with his wife upon DC.  CCP will continue to follow.         Discharge Codes    No documentation.             Marielle Fields RN

## 2020-02-13 NOTE — PROGRESS NOTES
Pharmacy Consult: Warfarin Dosing/ Monitoring    Milton Moody is a 54 y.o. male, estimated creatinine clearance is 91.5 mL/min (A) (by C-G formula based on SCr of 1.4 mg/dL (H)). weighing (!) 159 kg (351 lb 6.4 oz).     has a past medical history of Abnormal ECG, Asthma, Atrial fibrillation (CMS/HCC), Chest pain, Hyperlipidemia, Hypertension, Palpitations, Rapid heart rate, and Sleep apnea.    Social History     Tobacco Use   • Smoking status: Light Tobacco Smoker     Packs/day: 0.10     Years: 3.00     Pack years: 0.30     Types: Cigarettes     Start date: 2/7/2017   • Smokeless tobacco: Current User     Types: Snuff   Substance Use Topics   • Alcohol use: Yes     Alcohol/week: 6.0 standard drinks     Types: 6 Cans of beer per week     Frequency: 2-4 times a month     Drinks per session: 10 or more     Binge frequency: Monthly     Comment: weekend alcohol use   • Drug use: Not Currently     Types: Marijuana       Results from last 7 days   Lab Units 02/13/20  0603 02/12/20  0617 02/11/20  1048 02/11/20  0559 02/10/20  1917 02/10/20  1025   INR  1.32* 1.14*  --  1.15*  --  1.24*   APTT seconds 102.9* 82.8* 70.7* 86.5* 76.9* 35.8*   HEMOGLOBIN g/dL 18.6* 17.5  --  17.8*  --  17.9*   HEMATOCRIT % 55.0* 52.2*  --  52.4*  --  52.1*   PLATELETS 10*3/mm3 282 262  --  264  --  282     Results from last 7 days   Lab Units 02/12/20  0617 02/11/20  0559 02/10/20  1025   SODIUM mmol/L 138 138 138   POTASSIUM mmol/L 4.3 4.6 4.4   CHLORIDE mmol/L 99 101 101   CO2 mmol/L 25.0 25.4 23.5   BUN mg/dL 14 13 11   CREATININE mg/dL 1.40* 1.34* 1.25   CALCIUM mg/dL 9.1 8.8 8.9   GLUCOSE mg/dL 102* 89 113*     Anticoagulation history: home med: apixaban 5mg po q12h     Hospital Anticoagulation:  Consulting provider: Dr. Savage  Start date: 2/10/20  Indication: A fib  Target INR: 2-3  Expected duration: indefinite   Bridge Therapy: Yes              and unfractionated heparin  Date 2/10 2/11 2/12 2/13         INR 1.24 1.15 1.14 1.32          Warfarin dose 5mg 7.5mg 7.5mg 7.5mg           Potential drug interactions:     Relevant nutrition status: reg cardiac diet    Other:     Education complete?/ Date: yes, 2/11    Assessment/Plan:  Dose: will continue warfarin 7.5mg qday  Monitor INR daily  Follow up daily    Pharmacy will continue to follow until discharge or discontinuation of warfarin.   Artem HutchinsonD

## 2020-02-13 NOTE — PROGRESS NOTES
"CC: Congestive heart failure, atrial fibrillation    Interval History: No acute events overnight.      Vital Signs  Temp:  [97.5 °F (36.4 °C)-97.8 °F (36.6 °C)] 97.8 °F (36.6 °C)  Heart Rate:  [88-89] 89  Resp:  [20] 20  BP: (117-144)/(90-95) 144/90    Intake/Output Summary (Last 24 hours) at 2/13/2020 0822  Last data filed at 2/12/2020 1700  Gross per 24 hour   Intake 1540 ml   Output --   Net 1540 ml     Flowsheet Rows      First Filed Value   Admission Height  175.3 cm (69\") Documented at 02/10/2020 0944   Admission Weight  (!) 157 kg (346 lb 8 oz) Documented at 02/10/2020 0944          PHYSICAL EXAM:  General: No acute distress  Resp:NL Rate, symmetric chest expansion,unlabored, clear  CV: Irregularly irregular and rate controlled, NL PMI, NL S1 and S2, no Murmur, no gallop, no rub, No JVD.   ABD:Nl sounds, no masses or tenderness, nondistended, no guarding or rebound  Neuro: alert,cooperative and oriented  Extr:Normal pedal pulses, No edema or cyanosis, moves all extremities      Results Review:    Results from last 7 days   Lab Units 02/12/20  0617   SODIUM mmol/L 138   POTASSIUM mmol/L 4.3   CHLORIDE mmol/L 99   CO2 mmol/L 25.0   BUN mg/dL 14   CREATININE mg/dL 1.40*   GLUCOSE mg/dL 102*   CALCIUM mg/dL 9.1     Results from last 7 days   Lab Units 02/10/20  1025   TROPONIN T ng/mL <0.010     Results from last 7 days   Lab Units 02/13/20  0603   WBC 10*3/mm3 6.40   HEMOGLOBIN g/dL 18.6*   HEMATOCRIT % 55.0*   PLATELETS 10*3/mm3 282     Results from last 7 days   Lab Units 02/13/20  0603 02/12/20  0617 02/11/20  1048 02/11/20  0559   INR  1.32* 1.14*  --  1.15*   APTT seconds 102.9* 82.8* 70.7* 86.5*     Results from last 7 days   Lab Units 02/11/20  0559   CHOLESTEROL mg/dL 97     Results from last 7 days   Lab Units 02/11/20  0559   MAGNESIUM mg/dL 2.1     Results from last 7 days   Lab Units 02/11/20  0559   CHOLESTEROL mg/dL 97   TRIGLYCERIDES mg/dL 76   HDL CHOL mg/dL 37*   LDL CHOL mg/dL 45     I reviewed " the patient's new clinical results.  I personally viewed and interpreted the patient's EKG/Telemetry data        Medication Review:   Meds reviewed      heparin (porcine) 6.21 Units/kg/hr Last Rate: 10 Units/kg/hr (02/13/20 1619)   lactated ringers 9 mL/hr    Pharmacy to dose warfarin         Assessment/Plan   1.   Persistent atrial fibrillation with rapid ventricular response  -Episodes RVR overnight   -[id number] score of 2 with left atrial appendage thrombus on KONRAD  -Started on heparin drip with Coumadin.  Continue Coumadin until INR is therapeutic.  INR today is 1.15 but heparin is therapeutic  -Increase Coreg dose to 18.75 bid      2.  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  -consider ischemic work-up once patient is stable including coronary angiogram     3.  Morbidly obese- BMI 52  -Consulteded nutrition services  -Need referral to bariatric surgery as well.    4.  Hypertension  -Controlled    5.  Obstructive sleep apnea  -Continue home CPAP    6.  Medication noncompliance  -Patient counseled     7. Stage 2 CKD   -Slight increase in creatinine we will continue to monitor           Gavin Luke MD  02/13/20  8:22 AM

## 2020-02-14 LAB
ANION GAP SERPL CALCULATED.3IONS-SCNC: 13.7 MMOL/L (ref 5–15)
APTT PPP: 72.6 SECONDS (ref 22.7–35.4)
APTT PPP: 92.8 SECONDS (ref 22.7–35.4)
BASOPHILS # BLD AUTO: 0.06 10*3/MM3 (ref 0–0.2)
BASOPHILS NFR BLD AUTO: 1 % (ref 0–1.5)
BUN BLD-MCNC: 14 MG/DL (ref 6–20)
BUN/CREAT SERPL: 10.6 (ref 7–25)
CALCIUM SPEC-SCNC: 8.8 MG/DL (ref 8.6–10.5)
CHLORIDE SERPL-SCNC: 99 MMOL/L (ref 98–107)
CO2 SERPL-SCNC: 24.3 MMOL/L (ref 22–29)
CREAT BLD-MCNC: 1.32 MG/DL (ref 0.76–1.27)
DEPRECATED RDW RBC AUTO: 43.3 FL (ref 37–54)
EOSINOPHIL # BLD AUTO: 0.11 10*3/MM3 (ref 0–0.4)
EOSINOPHIL NFR BLD AUTO: 1.9 % (ref 0.3–6.2)
ERYTHROCYTE [DISTWIDTH] IN BLOOD BY AUTOMATED COUNT: 14.6 % (ref 12.3–15.4)
GFR SERPL CREATININE-BSD FRML MDRD: 69 ML/MIN/1.73
GLUCOSE BLD-MCNC: 108 MG/DL (ref 65–99)
HCT VFR BLD AUTO: 54.3 % (ref 37.5–51)
HGB BLD-MCNC: 18.3 G/DL (ref 13–17.7)
IMM GRANULOCYTES # BLD AUTO: 0.03 10*3/MM3 (ref 0–0.05)
IMM GRANULOCYTES NFR BLD AUTO: 0.5 % (ref 0–0.5)
INR PPP: 1.72 (ref 0.9–1.1)
LYMPHOCYTES # BLD AUTO: 2.43 10*3/MM3 (ref 0.7–3.1)
LYMPHOCYTES NFR BLD AUTO: 41 % (ref 19.6–45.3)
MAGNESIUM SERPL-MCNC: 2.1 MG/DL (ref 1.6–2.6)
MCH RBC QN AUTO: 28.9 PG (ref 26.6–33)
MCHC RBC AUTO-ENTMCNC: 33.7 G/DL (ref 31.5–35.7)
MCV RBC AUTO: 85.6 FL (ref 79–97)
MONOCYTES # BLD AUTO: 0.79 10*3/MM3 (ref 0.1–0.9)
MONOCYTES NFR BLD AUTO: 13.3 % (ref 5–12)
NEUTROPHILS # BLD AUTO: 2.5 10*3/MM3 (ref 1.7–7)
NEUTROPHILS NFR BLD AUTO: 42.3 % (ref 42.7–76)
NRBC BLD AUTO-RTO: 0 /100 WBC (ref 0–0.2)
PLATELET # BLD AUTO: 225 10*3/MM3 (ref 140–450)
PMV BLD AUTO: 10.2 FL (ref 6–12)
POTASSIUM BLD-SCNC: 4.5 MMOL/L (ref 3.5–5.2)
PROTHROMBIN TIME: 19.8 SECONDS (ref 11.7–14.2)
RBC # BLD AUTO: 6.34 10*6/MM3 (ref 4.14–5.8)
SODIUM BLD-SCNC: 137 MMOL/L (ref 136–145)
WBC NRBC COR # BLD: 5.92 10*3/MM3 (ref 3.4–10.8)

## 2020-02-14 PROCEDURE — 85730 THROMBOPLASTIN TIME PARTIAL: CPT | Performed by: INTERNAL MEDICINE

## 2020-02-14 PROCEDURE — 85610 PROTHROMBIN TIME: CPT | Performed by: INTERNAL MEDICINE

## 2020-02-14 PROCEDURE — 83735 ASSAY OF MAGNESIUM: CPT | Performed by: INTERNAL MEDICINE

## 2020-02-14 PROCEDURE — 99232 SBSQ HOSP IP/OBS MODERATE 35: CPT | Performed by: INTERNAL MEDICINE

## 2020-02-14 PROCEDURE — 85025 COMPLETE CBC W/AUTO DIFF WBC: CPT | Performed by: INTERNAL MEDICINE

## 2020-02-14 PROCEDURE — 25010000002 HEPARIN (PORCINE) PER 1000 UNITS: Performed by: INTERNAL MEDICINE

## 2020-02-14 PROCEDURE — 80048 BASIC METABOLIC PNL TOTAL CA: CPT | Performed by: INTERNAL MEDICINE

## 2020-02-14 RX ORDER — CARVEDILOL 25 MG/1
25 TABLET ORAL 2 TIMES DAILY WITH MEALS
Status: DISCONTINUED | OUTPATIENT
Start: 2020-02-14 | End: 2020-02-16 | Stop reason: HOSPADM

## 2020-02-14 RX ADMIN — LISINOPRIL 5 MG: 10 TABLET ORAL at 08:47

## 2020-02-14 RX ADMIN — HEPARIN SODIUM 11 UNITS/KG/HR: 10000 INJECTION, SOLUTION INTRAVENOUS at 02:27

## 2020-02-14 RX ADMIN — CARVEDILOL 25 MG: 25 TABLET, FILM COATED ORAL at 17:49

## 2020-02-14 RX ADMIN — SODIUM CHLORIDE, PRESERVATIVE FREE 10 ML: 5 INJECTION INTRAVENOUS at 08:47

## 2020-02-14 RX ADMIN — HEPARIN SODIUM 9 UNITS/KG/HR: 10000 INJECTION, SOLUTION INTRAVENOUS at 20:11

## 2020-02-14 RX ADMIN — WARFARIN SODIUM 7.5 MG: 7.5 TABLET ORAL at 17:49

## 2020-02-14 RX ADMIN — SODIUM CHLORIDE, PRESERVATIVE FREE 10 ML: 5 INJECTION INTRAVENOUS at 20:11

## 2020-02-14 RX ADMIN — CARVEDILOL 25 MG: 25 TABLET, FILM COATED ORAL at 08:47

## 2020-02-14 NOTE — PLAN OF CARE
Vss, no falls, no pain, ambulated in [place], heparin therapeutic, increased coreg, poss dc tomorrow if inr above 2, continue to monitor      Problem: Patient Care Overview  Goal: Plan of Care Review  Outcome: Ongoing (interventions implemented as appropriate)  Flowsheets (Taken 2/14/2020 1621)  Progress: improving  Plan of Care Reviewed With: patient  Goal: Individualization and Mutuality  Outcome: Ongoing (interventions implemented as appropriate)  Goal: Discharge Needs Assessment  Outcome: Ongoing (interventions implemented as appropriate)  Goal: Interprofessional Rounds/Family Conf  Outcome: Ongoing (interventions implemented as appropriate)     Problem: Arrhythmia/Dysrhythmia (Symptomatic) (Adult)  Goal: Signs and Symptoms of Listed Potential Problems Will be Absent, Minimized or Managed (Arrhythmia/Dysrhythmia)  Outcome: Ongoing (interventions implemented as appropriate)  Flowsheets (Taken 2/14/2020 5240)  Problems Assessed (Arrhythmia/Dysrhythmia): all  Problems Present (Dysrhythmia): none

## 2020-02-14 NOTE — PROGRESS NOTES
"CC: Atrial fibrillation    Interval History: No acute events overnight      Vital Signs  Temp:  [97.8 °F (36.6 °C)-98 °F (36.7 °C)] 98 °F (36.7 °C)  Heart Rate:  [] 86  Resp:  [18-20] 18  BP: (105-169)/(73-97) 105/89    Intake/Output Summary (Last 24 hours) at 2/14/2020 0736  Last data filed at 2/14/2020 0551  Gross per 24 hour   Intake 1625 ml   Output --   Net 1625 ml     Flowsheet Rows      First Filed Value   Admission Height  175.3 cm (69\") Documented at 02/10/2020 0944   Admission Weight  (!) 157 kg (346 lb 8 oz) Documented at 02/10/2020 0944          PHYSICAL EXAM:  General: No acute distress, morbidly obese  Resp:NL Rate, symmetric chest expansion,unlabored, clear  CV: Irregularly irregular and normal rate, NL PMI, NL S1 and S2, no Murmur, no gallop, no rub, No JVD.   ABD:Nl sounds, no masses or tenderness, nondistended, no guarding or rebound  Neuro: alert,cooperative and oriented  Extr:Normal pedal pulses, No edema or cyanosis, moves all extremities      Results Review:    Results from last 7 days   Lab Units 02/14/20  0602   SODIUM mmol/L 137   POTASSIUM mmol/L 4.5   CHLORIDE mmol/L 99   CO2 mmol/L 24.3   BUN mg/dL 14   CREATININE mg/dL 1.32*   GLUCOSE mg/dL 108*   CALCIUM mg/dL 8.8     Results from last 7 days   Lab Units 02/10/20  1025   TROPONIN T ng/mL <0.010     Results from last 7 days   Lab Units 02/14/20  0602   WBC 10*3/mm3 5.92   HEMOGLOBIN g/dL 18.3*   HEMATOCRIT % 54.3*   PLATELETS 10*3/mm3 225     Results from last 7 days   Lab Units 02/14/20  0602 02/13/20  2053 02/13/20  1315 02/13/20  0603 02/12/20  0617   INR  1.72*  --   --  1.32* 1.14*   APTT seconds 92.8* 82.0* 62.6* 102.9* 82.8*     Results from last 7 days   Lab Units 02/11/20  0559   CHOLESTEROL mg/dL 97     Results from last 7 days   Lab Units 02/14/20  0602   MAGNESIUM mg/dL 2.1     Results from last 7 days   Lab Units 02/11/20  0559   CHOLESTEROL mg/dL 97   TRIGLYCERIDES mg/dL 76   HDL CHOL mg/dL 37*   LDL CHOL mg/dL 45 "     I reviewed the patient's new clinical results.  I personally viewed and interpreted the patient's EKG/Telemetry data        Medication Review:   Meds reviewed      heparin (porcine) 6.21 Units/kg/hr Last Rate: 9 Units/kg/hr (02/14/20 0729)   lactated ringers 9 mL/hr    Pharmacy to dose warfarin         Assessment/Plan    Assessment/Plan   1.   Persistent atrial fibrillation with rapid ventricular response  -Heart rate better controlled  -[id number] score of 2 with left atrial appendage thrombus on KONRAD  -Started on heparin drip with Coumadin.  Continue Coumadin until INR is therapeutic.  INR today is 1.72 but heparin is therapeutic  -Increase Coreg dose to 25 mg p.o. twice daily     2.  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  -consider ischemic work-up once patient is stable including coronary angiogram- will do as out patient      3.  Morbidly obese- BMI 52  -Consulteded nutrition services  -Need referral to bariatric surgery as well after discharge      4.  Hypertension  -Controlled     5.  Obstructive sleep apnea  -Continue home CPAP     6.  Medication noncompliance  -Patient counseled     7. Stage 2 CKD   -Stable creatinine    Probably home tomorrow.      Gavin Luke MD  02/14/20  7:36 AM

## 2020-02-14 NOTE — PLAN OF CARE
Problem: Patient Care Overview  Goal: Plan of Care Review  Outcome: Ongoing (interventions implemented as appropriate)  Flowsheets (Taken 2/14/2020 2357)  Progress: improving  Plan of Care Reviewed With: patient  Outcome Summary: PTT therapeutic at last draw. Awaiting am labs. Pt up ad harvey without c/o's. Uneventful night. Awaiting further plan of care.

## 2020-02-14 NOTE — PROGRESS NOTES
Pharmacy Consult: Warfarin Dosing/ Monitoring    Milton Moody is a 54 y.o. male, estimated creatinine clearance is 97 mL/min (A) (by C-G formula based on SCr of 1.32 mg/dL (H)). weighing (!) 159 kg (351 lb 6.4 oz).     has a past medical history of Abnormal ECG, Asthma, Atrial fibrillation (CMS/HCC), Chest pain, Hyperlipidemia, Hypertension, Palpitations, Rapid heart rate, and Sleep apnea.    Social History     Tobacco Use    Smoking status: Light Tobacco Smoker     Packs/day: 0.10     Years: 3.00     Pack years: 0.30     Types: Cigarettes     Start date: 2/7/2017    Smokeless tobacco: Current User     Types: Snuff   Substance Use Topics    Alcohol use: Yes     Alcohol/week: 6.0 standard drinks     Types: 6 Cans of beer per week     Frequency: 2-4 times a month     Drinks per session: 10 or more     Binge frequency: Monthly     Comment: weekend alcohol use    Drug use: Not Currently     Types: Marijuana       Results from last 7 days   Lab Units 02/14/20  0602 02/13/20 2053 02/13/20  1315 02/13/20  0603 02/12/20  0617 02/11/20  1048 02/11/20  0559  02/10/20  1025   INR  1.72*  --   --  1.32* 1.14*  --  1.15*  --  1.24*   APTT seconds 92.8* 82.0* 62.6* 102.9* 82.8* 70.7* 86.5*   < > 35.8*   HEMOGLOBIN g/dL 18.3*  --   --  18.6* 17.5  --  17.8*  --  17.9*   HEMATOCRIT % 54.3*  --   --  55.0* 52.2*  --  52.4*  --  52.1*   PLATELETS 10*3/mm3 225  --   --  282 262  --  264  --  282    < > = values in this interval not displayed.     Results from last 7 days   Lab Units 02/14/20  0602 02/12/20  0617 02/11/20  0559   SODIUM mmol/L 137 138 138   POTASSIUM mmol/L 4.5 4.3 4.6   CHLORIDE mmol/L 99 99 101   CO2 mmol/L 24.3 25.0 25.4   BUN mg/dL 14 14 13   CREATININE mg/dL 1.32* 1.40* 1.34*   CALCIUM mg/dL 8.8 9.1 8.8   GLUCOSE mg/dL 108* 102* 89     Anticoagulation history: home med: apixaban 5mg po q12h     Hospital Anticoagulation:  Consulting provider: Dr. Savage  Start date: 2/10/20  Indication: A fib  Target INR:  2-3  Expected duration: indefinite   Bridge Therapy: Yes              and unfractionated heparin  Date 2/10 2/11 2/12 2/13 2/14        INR 1.24 1.15 1.14 1.32 1.72        Warfarin dose 5mg 7.5mg 7.5mg 7.5mg 7.5mg          Potential drug interactions:     Relevant nutrition status: reg cardiac diet    Other:     Education complete?/ Date: yes, 2/11    Assessment/Plan:  Dose: 7.5mg daily was written on 2/11/20 and INR is rising so will continue with this regimen.  Monitor INR daily  Follow up daily    Pharmacy will continue to follow until discharge or discontinuation of warfarin.   Ugo Livingston [facility]

## 2020-02-15 LAB
APTT PPP: 42.6 SECONDS (ref 22.7–35.4)
APTT PPP: 87.6 SECONDS (ref 22.7–35.4)
BASOPHILS # BLD AUTO: 0.05 10*3/MM3 (ref 0–0.2)
BASOPHILS NFR BLD AUTO: 0.9 % (ref 0–1.5)
DEPRECATED RDW RBC AUTO: 40.9 FL (ref 37–54)
EOSINOPHIL # BLD AUTO: 0.12 10*3/MM3 (ref 0–0.4)
EOSINOPHIL NFR BLD AUTO: 2.1 % (ref 0.3–6.2)
ERYTHROCYTE [DISTWIDTH] IN BLOOD BY AUTOMATED COUNT: 14.5 % (ref 12.3–15.4)
HCT VFR BLD AUTO: 55.8 % (ref 37.5–51)
HGB BLD-MCNC: 18.9 G/DL (ref 13–17.7)
IMM GRANULOCYTES # BLD AUTO: 0.03 10*3/MM3 (ref 0–0.05)
IMM GRANULOCYTES NFR BLD AUTO: 0.5 % (ref 0–0.5)
INR PPP: 2.09 (ref 0.9–1.1)
LYMPHOCYTES # BLD AUTO: 2.5 10*3/MM3 (ref 0.7–3.1)
LYMPHOCYTES NFR BLD AUTO: 43.1 % (ref 19.6–45.3)
MCH RBC QN AUTO: 28.3 PG (ref 26.6–33)
MCHC RBC AUTO-ENTMCNC: 33.9 G/DL (ref 31.5–35.7)
MCV RBC AUTO: 83.4 FL (ref 79–97)
MONOCYTES # BLD AUTO: 0.77 10*3/MM3 (ref 0.1–0.9)
MONOCYTES NFR BLD AUTO: 13.3 % (ref 5–12)
NEUTROPHILS # BLD AUTO: 2.33 10*3/MM3 (ref 1.7–7)
NEUTROPHILS NFR BLD AUTO: 40.1 % (ref 42.7–76)
NRBC BLD AUTO-RTO: 0 /100 WBC (ref 0–0.2)
PLATELET # BLD AUTO: 261 10*3/MM3 (ref 140–450)
PMV BLD AUTO: 9.5 FL (ref 6–12)
PROTHROMBIN TIME: 23.1 SECONDS (ref 11.7–14.2)
RBC # BLD AUTO: 6.69 10*6/MM3 (ref 4.14–5.8)
WBC NRBC COR # BLD: 5.8 10*3/MM3 (ref 3.4–10.8)

## 2020-02-15 PROCEDURE — 85730 THROMBOPLASTIN TIME PARTIAL: CPT | Performed by: INTERNAL MEDICINE

## 2020-02-15 PROCEDURE — 93005 ELECTROCARDIOGRAM TRACING: CPT | Performed by: INTERNAL MEDICINE

## 2020-02-15 PROCEDURE — 85610 PROTHROMBIN TIME: CPT | Performed by: INTERNAL MEDICINE

## 2020-02-15 PROCEDURE — 93010 ELECTROCARDIOGRAM REPORT: CPT | Performed by: INTERNAL MEDICINE

## 2020-02-15 PROCEDURE — 99233 SBSQ HOSP IP/OBS HIGH 50: CPT | Performed by: INTERNAL MEDICINE

## 2020-02-15 PROCEDURE — 25010000002 DIGOXIN PER 500 MCG: Performed by: INTERNAL MEDICINE

## 2020-02-15 PROCEDURE — 85025 COMPLETE CBC W/AUTO DIFF WBC: CPT | Performed by: INTERNAL MEDICINE

## 2020-02-15 RX ORDER — WARFARIN SODIUM 6 MG/1
6 TABLET ORAL
Status: DISCONTINUED | OUTPATIENT
Start: 2020-02-15 | End: 2020-02-16 | Stop reason: HOSPADM

## 2020-02-15 RX ORDER — DIGOXIN 125 MCG
125 TABLET ORAL
Status: DISCONTINUED | OUTPATIENT
Start: 2020-02-16 | End: 2020-02-16 | Stop reason: HOSPADM

## 2020-02-15 RX ORDER — DIGOXIN 0.25 MG/ML
500 INJECTION INTRAMUSCULAR; INTRAVENOUS ONCE
Status: COMPLETED | OUTPATIENT
Start: 2020-02-15 | End: 2020-02-15

## 2020-02-15 RX ADMIN — WARFARIN SODIUM 6 MG: 6 TABLET ORAL at 17:01

## 2020-02-15 RX ADMIN — SODIUM CHLORIDE, PRESERVATIVE FREE 10 ML: 5 INJECTION INTRAVENOUS at 20:25

## 2020-02-15 RX ADMIN — SODIUM CHLORIDE, PRESERVATIVE FREE 3 ML: 5 INJECTION INTRAVENOUS at 09:43

## 2020-02-15 RX ADMIN — SODIUM CHLORIDE, PRESERVATIVE FREE 3 ML: 5 INJECTION INTRAVENOUS at 20:25

## 2020-02-15 RX ADMIN — CARVEDILOL 25 MG: 25 TABLET, FILM COATED ORAL at 09:42

## 2020-02-15 RX ADMIN — DIGOXIN 500 MCG: 250 INJECTION, SOLUTION INTRAMUSCULAR; INTRAVENOUS at 14:10

## 2020-02-15 RX ADMIN — ACETAMINOPHEN 650 MG: 325 TABLET, FILM COATED ORAL at 09:46

## 2020-02-15 RX ADMIN — SODIUM CHLORIDE, PRESERVATIVE FREE 10 ML: 5 INJECTION INTRAVENOUS at 09:43

## 2020-02-15 RX ADMIN — LISINOPRIL 5 MG: 10 TABLET ORAL at 09:42

## 2020-02-15 NOTE — PLAN OF CARE
Problem: Patient Care Overview  Goal: Plan of Care Review  Outcome: Ongoing (interventions implemented as appropriate)  Flowsheets (Taken 2/15/2020 5298)  Plan of Care Reviewed With: patient  Outcome Summary: Remains on heparin drip until INR greater than 2. Pt pleasant without c/o's. Wife @ bedside this night. Pulse pressure narrow, averaging around 15. Hypertensive. Pt without c/o's/

## 2020-02-15 NOTE — PROGRESS NOTES
Pharmacy Consult: Warfarin Dosing/ Monitoring    Milton Moody is a 54 y.o. male, estimated creatinine clearance is 97 mL/min (A) (by C-G formula based on SCr of 1.32 mg/dL (H)). weighing (!) 159 kg (351 lb 6.4 oz).     has a past medical history of Abnormal ECG, Asthma, Atrial fibrillation (CMS/HCC), Chest pain, Hyperlipidemia, Hypertension, Palpitations, Rapid heart rate, and Sleep apnea.    Social History     Tobacco Use    Smoking status: Light Tobacco Smoker     Packs/day: 0.10     Years: 3.00     Pack years: 0.30     Types: Cigarettes     Start date: 2/7/2017    Smokeless tobacco: Current User     Types: Snuff   Substance Use Topics    Alcohol use: Yes     Alcohol/week: 6.0 standard drinks     Types: 6 Cans of beer per week     Frequency: 2-4 times a month     Drinks per session: 10 or more     Binge frequency: Monthly     Comment: weekend alcohol use    Drug use: Not Currently     Types: Marijuana       Results from last 7 days   Lab Units 02/14/20  0602 02/13/20 2053 02/13/20  1315 02/13/20  0603 02/12/20  0617 02/11/20  1048 02/11/20  0559  02/10/20  1025   INR  1.72*  --   --  1.32* 1.14*  --  1.15*  --  1.24*   APTT seconds 92.8* 82.0* 62.6* 102.9* 82.8* 70.7* 86.5*   < > 35.8*   HEMOGLOBIN g/dL 18.3*  --   --  18.6* 17.5  --  17.8*  --  17.9*   HEMATOCRIT % 54.3*  --   --  55.0* 52.2*  --  52.4*  --  52.1*   PLATELETS 10*3/mm3 225  --   --  282 262  --  264  --  282    < > = values in this interval not displayed.     Results from last 7 days   Lab Units 02/14/20  0602 02/12/20  0617 02/11/20  0559   SODIUM mmol/L 137 138 138   POTASSIUM mmol/L 4.5 4.3 4.6   CHLORIDE mmol/L 99 99 101   CO2 mmol/L 24.3 25.0 25.4   BUN mg/dL 14 14 13   CREATININE mg/dL 1.32* 1.40* 1.34*   CALCIUM mg/dL 8.8 9.1 8.8   GLUCOSE mg/dL 108* 102* 89     Anticoagulation history: home med: apixaban 5mg po q12h     Hospital Anticoagulation:  Consulting provider: Dr. Savage  Start date: 2/10/20  Indication: A fib  Target INR:  2-3  Expected duration: indefinite   Bridge Therapy: INR therapeutic  Date 2/10 2/11 2/12 2/13 2/14 2/15       INR 1.24 1.15 1.14 1.32 1.72 2.09       Warfarin dose 5mg 7.5mg 7.5mg 7.5mg 7.5mg 6 mg         Potential drug interactions:     Relevant nutrition status: reg cardiac diet    Other:     Education complete?/ Date: yes, 2/11    Assessment/Plan:  INR now therapeutic and continuing to increase.  Will slightly decrease warfarin to 6 mg nightly.  Monitor INR daily  Follow up daily    Pharmacy will continue to follow until discharge or discontinuation of warfarin.   Lei Hutson, PharmD  2/15/2020 1550

## 2020-02-16 VITALS
TEMPERATURE: 98 F | RESPIRATION RATE: 16 BRPM | OXYGEN SATURATION: 97 % | DIASTOLIC BLOOD PRESSURE: 87 MMHG | WEIGHT: 315 LBS | HEIGHT: 69 IN | BODY MASS INDEX: 46.65 KG/M2 | SYSTOLIC BLOOD PRESSURE: 102 MMHG | HEART RATE: 88 BPM

## 2020-02-16 PROBLEM — Z79.01 ANTICOAGULATED ON COUMADIN: Status: ACTIVE | Noted: 2020-02-16

## 2020-02-16 LAB
ANION GAP SERPL CALCULATED.3IONS-SCNC: 15.2 MMOL/L (ref 5–15)
APTT PPP: 45.2 SECONDS (ref 22.7–35.4)
BASOPHILS # BLD AUTO: 0.04 10*3/MM3 (ref 0–0.2)
BASOPHILS NFR BLD AUTO: 0.8 % (ref 0–1.5)
BUN BLD-MCNC: 14 MG/DL (ref 6–20)
BUN/CREAT SERPL: 11 (ref 7–25)
CALCIUM SPEC-SCNC: 9 MG/DL (ref 8.6–10.5)
CHLORIDE SERPL-SCNC: 100 MMOL/L (ref 98–107)
CO2 SERPL-SCNC: 19.8 MMOL/L (ref 22–29)
CREAT BLD-MCNC: 1.27 MG/DL (ref 0.76–1.27)
DEPRECATED RDW RBC AUTO: 42.3 FL (ref 37–54)
EOSINOPHIL # BLD AUTO: 0.09 10*3/MM3 (ref 0–0.4)
EOSINOPHIL NFR BLD AUTO: 1.8 % (ref 0.3–6.2)
ERYTHROCYTE [DISTWIDTH] IN BLOOD BY AUTOMATED COUNT: 14.9 % (ref 12.3–15.4)
GFR SERPL CREATININE-BSD FRML MDRD: 72 ML/MIN/1.73
GLUCOSE BLD-MCNC: 98 MG/DL (ref 65–99)
HCT VFR BLD AUTO: 53.1 % (ref 37.5–51)
HGB BLD-MCNC: 18 G/DL (ref 13–17.7)
IMM GRANULOCYTES # BLD AUTO: 0.02 10*3/MM3 (ref 0–0.05)
IMM GRANULOCYTES NFR BLD AUTO: 0.4 % (ref 0–0.5)
INR PPP: 2.18 (ref 0.9–1.1)
LYMPHOCYTES # BLD AUTO: 2.06 10*3/MM3 (ref 0.7–3.1)
LYMPHOCYTES NFR BLD AUTO: 41.4 % (ref 19.6–45.3)
MCH RBC QN AUTO: 28.7 PG (ref 26.6–33)
MCHC RBC AUTO-ENTMCNC: 33.9 G/DL (ref 31.5–35.7)
MCV RBC AUTO: 84.6 FL (ref 79–97)
MONOCYTES # BLD AUTO: 0.7 10*3/MM3 (ref 0.1–0.9)
MONOCYTES NFR BLD AUTO: 14.1 % (ref 5–12)
NEUTROPHILS # BLD AUTO: 2.07 10*3/MM3 (ref 1.7–7)
NEUTROPHILS NFR BLD AUTO: 41.5 % (ref 42.7–76)
NRBC BLD AUTO-RTO: 0 /100 WBC (ref 0–0.2)
PLATELET # BLD AUTO: 251 10*3/MM3 (ref 140–450)
PMV BLD AUTO: 9.9 FL (ref 6–12)
POTASSIUM BLD-SCNC: 4.5 MMOL/L (ref 3.5–5.2)
PROTHROMBIN TIME: 24 SECONDS (ref 11.7–14.2)
RBC # BLD AUTO: 6.28 10*6/MM3 (ref 4.14–5.8)
SODIUM BLD-SCNC: 135 MMOL/L (ref 136–145)
WBC NRBC COR # BLD: 4.98 10*3/MM3 (ref 3.4–10.8)

## 2020-02-16 PROCEDURE — 85025 COMPLETE CBC W/AUTO DIFF WBC: CPT | Performed by: INTERNAL MEDICINE

## 2020-02-16 PROCEDURE — 85730 THROMBOPLASTIN TIME PARTIAL: CPT | Performed by: INTERNAL MEDICINE

## 2020-02-16 PROCEDURE — 99239 HOSP IP/OBS DSCHRG MGMT >30: CPT | Performed by: INTERNAL MEDICINE

## 2020-02-16 PROCEDURE — 80048 BASIC METABOLIC PNL TOTAL CA: CPT | Performed by: INTERNAL MEDICINE

## 2020-02-16 PROCEDURE — 85610 PROTHROMBIN TIME: CPT | Performed by: INTERNAL MEDICINE

## 2020-02-16 RX ORDER — WARFARIN SODIUM 6 MG/1
TABLET ORAL
Qty: 30 TABLET | Refills: 5 | Status: SHIPPED | OUTPATIENT
Start: 2020-02-16 | End: 2020-03-04 | Stop reason: SDUPTHER

## 2020-02-16 RX ORDER — NITROGLYCERIN 0.4 MG/1
0.4 TABLET SUBLINGUAL
Qty: 30 TABLET | Refills: 3 | Status: SHIPPED | OUTPATIENT
Start: 2020-02-16 | End: 2020-05-28

## 2020-02-16 RX ORDER — DIGOXIN 125 MCG
125 TABLET ORAL
Qty: 30 TABLET | Refills: 11 | Status: SHIPPED | OUTPATIENT
Start: 2020-02-17 | End: 2021-02-08

## 2020-02-16 RX ORDER — LISINOPRIL 5 MG/1
5 TABLET ORAL
Qty: 30 TABLET | Refills: 11 | Status: SHIPPED | OUTPATIENT
Start: 2020-02-17 | End: 2021-02-08

## 2020-02-16 RX ORDER — CARVEDILOL 25 MG/1
25 TABLET ORAL 2 TIMES DAILY WITH MEALS
Qty: 30 TABLET | Refills: 11 | Status: SHIPPED | OUTPATIENT
Start: 2020-02-16 | End: 2020-08-26

## 2020-02-16 RX ADMIN — LISINOPRIL 5 MG: 10 TABLET ORAL at 08:22

## 2020-02-16 RX ADMIN — DIGOXIN 125 MCG: 125 TABLET ORAL at 11:50

## 2020-02-16 RX ADMIN — CARVEDILOL 25 MG: 25 TABLET, FILM COATED ORAL at 08:22

## 2020-02-16 NOTE — PROGRESS NOTES
Pharmacy Consult: Warfarin Dosing/ Monitoring    Milton Moody is a 54 y.o. male, estimated creatinine clearance is 102.8 mL/min (by C-G formula based on SCr of 1.27 mg/dL). weighing (!) 165 kg (363 lb 12.1 oz).     has a past medical history of Abnormal ECG, Asthma, Atrial fibrillation (CMS/HCC), Chest pain, Hyperlipidemia, Hypertension, Palpitations, Rapid heart rate, and Sleep apnea.    Social History     Tobacco Use    Smoking status: Light Tobacco Smoker     Packs/day: 0.10     Years: 3.00     Pack years: 0.30     Types: Cigarettes     Start date: 2/7/2017    Smokeless tobacco: Current User     Types: Snuff   Substance Use Topics    Alcohol use: Yes     Alcohol/week: 6.0 standard drinks     Types: 6 Cans of beer per week     Frequency: 2-4 times a month     Drinks per session: 10 or more     Binge frequency: Monthly     Comment: weekend alcohol use    Drug use: Not Currently     Types: Marijuana       Results from last 7 days   Lab Units 02/16/20  0502 02/15/20  1511 02/15/20  0559 02/14/20  1320 02/14/20  0602 02/13/20  2053 02/13/20  1315 02/13/20  0603 02/12/20  0617  02/11/20  0559  02/10/20  1025   INR  2.18*  --  2.09*  --  1.72*  --   --  1.32* 1.14*  --  1.15*  --  1.24*   APTT seconds 45.2* 42.6* 87.6* 72.6* 92.8* 82.0* 62.6* 102.9* 82.8*   < > 86.5*   < > 35.8*   HEMOGLOBIN g/dL 18.0*  --  18.9*  --  18.3*  --   --  18.6* 17.5  --  17.8*  --  17.9*   HEMATOCRIT % 53.1*  --  55.8*  --  54.3*  --   --  55.0* 52.2*  --  52.4*  --  52.1*   PLATELETS 10*3/mm3 251  --  261  --  225  --   --  282 262  --  264  --  282    < > = values in this interval not displayed.     Results from last 7 days   Lab Units 02/16/20  0502 02/14/20  0602 02/12/20  0617   SODIUM mmol/L 135* 137 138   POTASSIUM mmol/L 4.5 4.5 4.3   CHLORIDE mmol/L 100 99 99   CO2 mmol/L 19.8* 24.3 25.0   BUN mg/dL 14 14 14   CREATININE mg/dL 1.27 1.32* 1.40*   CALCIUM mg/dL 9.0 8.8 9.1   GLUCOSE mg/dL 98 108* 102*     Anticoagulation history: home  med: apixaban 5mg po q12h     Hospital Anticoagulation:  Consulting provider: Dr. Savage  Start date: 2/10/20  Indication: A fib  Target INR: 2-3  Expected duration: indefinite   Bridge Therapy: INR therapeutic  Date 2/10 2/11 2/12 2/13 2/14 2/15 2/16      INR 1.24 1.15 1.14 1.32 1.72 2.09 2.18      Warfarin dose 5mg 7.5mg 7.5mg 7.5mg 7.5mg 6 mg 6 mg        Potential drug interactions:     Relevant nutrition status: reg cardiac diet    Other:     Education complete?/ Date: yes, 2/11    Assessment/Plan:  INR therapetic, continue warfarin 6 mg nightly.  Monitor INR daily  Follow up daily    Pharmacy will continue to follow until discharge or discontinuation of warfarin.   Lei Hutson, PharmD  2/15/2020 8299

## 2020-02-16 NOTE — DISCHARGE SUMMARY
Date of Admit: 2/10/2020    Date of Discharge:  2/16/2020      Discharge Diagnosis:  Problem List Items Addressed This Visit        Cardiovascular and Mediastinum    Atrial fibrillation with rapid ventricular response (CMS/HCC)    Relevant Medications    carvedilol (COREG) 25 MG tablet    digoxin (LANOXIN) 125 MCG tablet (Start on 2/17/2020)    nitroglycerin (NITROSTAT) 0.4 MG SL tablet    Other Relevant Orders    Ambulatory Referral to Anticoagulation Monitoring       Hematopoietic and Hemostatic    Anticoagulated on Coumadin - Primary    Relevant Orders    Ambulatory Referral to Anticoagulation Monitoring      Acute congestive systolic heart failure from nonischemic cardiomyopathy  Nonischemic cardiomyopathy  Atrial fibrillation with rapid ventricular response  Left atrial appendage clot  Morbid obesity  Obstructive sleep apnea  Hypertension    Hospital Course:   Mr. Moody is a 54 years old man who was admitted with diagnosis of acute congestive heart failure and atrial fibrillation with rapid ventricular response.  He came to the echo lab for evaluation of low ventricular ejection fraction as he has had atrial fibrillation for a while.  He is EF was found out to be less than 20% and patient was having difficulty of breathing that prompted his admission.  He was subsequently placed on rate control medication.  Despite low EF he looked clinically compensated with no evidence of overt congestive heart failure or volume overload.  Adjustments were made to his medication to get better heart rate control and eventually it was controlled on Coreg 25 mg p.o. twice daily.  Lisinopril added and he can only tolerate 5 mg p.o. daily as he develops symptomatic hypotension with higher doses.   KONRAD guided cardioversion planned but cardioversion deferred as he was noted to have left atrial appendage clot.  He was started on anticoagulation with Coumadin with heparin bridging and finally get therapeutic.  He was continued on home  CPAP.  His blood pressure which was hard to control actually was well controlled in the hospital pointing out patient medication noncompliance.  Flecainide discontinued as he developed cardiomyopathy.  I have seen and examined patient on day of discharge and he is hemodynamically stable and significantly improved.  Inpatient dietary consult made to help patient lose weight.  Discharge care including patient education and counseling took more than 30 minutes.    Procedures Performed     KONRAD    Consults     No orders found from 1/12/2020 to 2/11/2020.          Condition on discharge-significantly improved and stable      Discharge Medications     Discharge Medications      New Medications      Instructions Start Date   carvedilol 25 MG tablet  Commonly known as:  COREG   25 mg, Oral, 2 Times Daily With Meals      digoxin 125 MCG tablet  Commonly known as:  LANOXIN   125 mcg, Oral, Daily Digoxin   Start Date:  February 17, 2020     nitroglycerin 0.4 MG SL tablet  Commonly known as:  NITROSTAT   0.4 mg, Sublingual, Every 5 Minutes PRN, Take no more than 3 doses in 15 minutes.      warfarin 6 MG tablet  Commonly known as:  COUMADIN   TAKE 6 MG DAILY         Changes to Medications      Instructions Start Date   lisinopril 5 MG tablet  Commonly known as:  AMINATAZESTRIL  What changed:    · medication strength  · how much to take  · when to take this   5 mg, Oral, Every 24 Hours Scheduled   Start Date:  February 17, 2020        Continue These Medications      Instructions Start Date   atorvastatin 40 MG tablet  Commonly known as:  LIPITOR   40 mg, Oral, Nightly         Stop These Medications    albuterol (2.5 MG/3ML) 0.083% nebulizer solution  Commonly known as:  PROVENTIL     apixaban 5 MG tablet tablet  Commonly known as:  ELIQUIS     aspirin 325 MG tablet     cyclobenzaprine 10 MG tablet  Commonly known as:  FLEXERIL     fish oil 1000 MG capsule capsule     flecainide 100 MG tablet  Commonly known as:  TAMBOCOR    "  fluticasone 50 MCG/ACT nasal spray  Commonly known as:  FLONASE     HYDROcodone-acetaminophen  MG per tablet  Commonly known as:  NORCO     meloxicam 15 MG tablet  Commonly known as:  MOBIC     metoprolol tartrate 100 MG tablet  Commonly known as:  LOPRESSOR     montelukast 10 MG tablet  Commonly known as:  SINGULAIR     MULTIPLE VITAMIN PO     oxyCODONE-acetaminophen  MG per tablet  Commonly known as:  PERCOCET     Potassium 99 MG tablet     raNITIdine 300 MG tablet  Commonly known as:  ZANTAC     vitamin E 200 UNIT capsule              Activity at Discharge: As tolerated    Follow-up Appointments-see in cardiology clinic in 2 weeks  Additional Instructions for the Follow-ups that You Need to Schedule     Ambulatory Referral to Anticoagulation Monitoring   As directed       Select To DEPT SPEC to filter TO DEPT       Send INR reminders to the \"clinical pool\" of the TO DEPT     (ie:  [facility]ED[facility]  or [facility] CLINICAL POOL)     Discharge Follow-up with Specified Provider: cardiology clinic; 2 Weeks   As directed      To:  cardiology clinic    Follow Up:  2 Weeks           Follow-up Information     Beatrice Rucker MD .    Specialty:  Internal Medicine  Contact information:  [address] [place], [place] [address] [phone]                   Discharge diet-AHA diet      DISCHARGE DISPOSITION: Home     Gavin Luke MD  02/16/20  2:49 PM      "

## 2020-02-16 NOTE — PLAN OF CARE
Problem: Patient Care Overview  Goal: Plan of Care Review  Outcome: Ongoing (interventions implemented as appropriate)  Flowsheets (Taken 2/16/2020 1574)  Plan of Care Reviewed With: patient  Outcome Summary: Heparin drip discontinued. INR greater than 2. Pt cont to have altered rhythm- appears as if he is flipping QRS axis as QRS measuring the same and no change in rate.Pt without c/o's ths shift. Awaiting disposition.

## 2020-02-17 ENCOUNTER — READMISSION MANAGEMENT (OUTPATIENT)
Dept: CALL CENTER | Facility: HOSPITAL | Age: 55
End: 2020-02-17

## 2020-02-17 NOTE — PAYOR COMM NOTE
"Virgilio Moody (54 y.o. Male)     PLEASE SEE ATTACHED CLINICAL REVIEW.    REF#[id number]    PLEASE CALL   OR  [phone] WITH AUTH FOR 2/15/20    THANK YOU    ARISTEO GALAN LPN CCP    Date of Birth Social Security Number Address Home Phone MRN    1965  [address] [address] [phone] [id number]    [facility] Mar[facility] Status          Unknown        Admission Date Admission Type Admitting Provider Attending Provider Department, Room/Bed    2/10/20 Urgent Gavin Luke MD  [address], [address]    Discharge Date Discharge Disposition Discharge Destination        2/16/2020 Home or Self Care              Attending Provider:  (none)   Allergies:  Seasonal Ic [Cholestatin]    Isolation:  None   Infection:  None   Code Status:  Prior    Ht:  175.3 cm (69\")   Wt:  165 kg (363 lb 12.1 oz)    Admission Cmt:  None   Principal Problem:  None                Active Insurance as of 2/10/2020     Primary Coverage     Payor Plan Insurance Group Employer/Plan Group    [employer] PPO [id number]     Payor Plan Address Payor Plan Phone Number Payor Plan Fax Number Effective Dates    [address] [phone]  6/1/2014 - None Entered    [facility] [id number]       Subscriber Name Subscriber Birth Date Member ID       VIRGILIO MOODY 1965 [id number]                 Emergency Contacts      (Rel.) Home Phone Work Phone Mobile Phone    WANDA MOODY (Spouse) -- -- [phone]            Oxygen Therapy (last 2 days) before discharge     Date/Time   SpO2   Device (Oxygen Therapy)   Flow (L/min)   Oxygen Concentration (%)   ETCO2 (mmHg)    02/16/20 1438   97   nasal cannula;room air   --   --   --    02/16/20 0816   --   room air   --   --   --    02/16/20 0758   97   room air   --   --   --    02/16/20 0500   99   --   --   --   --    02/16/20 0300   96   --   --   --   --    02/16/20 0100   96   --   --   --  "  --    02/15/20 2334   97   room air   --   --   --    02/15/20 2300   95   --   --   --   --    02/15/20 2100   98   --   --   --   --    02/15/20 1900   98   --   --   --   --    02/15/20 1853   --   room air   --   --   --    02/15/20 1400   --   room air   --   --   --    02/15/20 1341   98   room air   --   --   --    02/15/20 0800   --   room air   --   --   --    02/15/20 0745   100   room air   --   --   --    02/15/20 0500   99   --   --   --   --    02/15/20 0300   99   --   --   --   --    02/15/20 0100   94   --   --   --   --    02/15/20 0029   --   CPAP   --   --   --    02/14/20 2337   --   room air   --   --   --    02/14/20 2336   98   CPAP   --   --   --    02/14/20 2300   97   --   --   --   --    02/14/20 2100   99   --   --   --   --    02/14/20 1935   97   room air   --   --   --    02/14/20 1900   97   --   --   --   --    02/14/20 1423   --   room air   --   --   --    02/14/20 1318   97   room air   --   --   --    02/14/20 0713   97   --   --   --   --    02/14/20 0500   95   --   --   --   --    02/14/20 0300   96   --   --   --   --    02/14/20 0100   99   --   --   --   --    02/14/20 0037   --   CPAP   --   --   --            Intake & Output (last 2 days)       02/15 0701 - 02/16 0700 02/16 0701 - 02/17 0700 02/17 0701 - 02/18 0700    P.O. 660 690     I.V. (mL/kg)       Total Intake(mL/kg) 660 (4) 690 (4.2)     Net +660 +690            Urine Unmeasured Occurrence 2 x          Lines, Drains & Airways    Active LDAs     None                Blood Administration Record (From admission, onward)    None          Orders (last 48 hrs)      Start     Ordered    02/17/20 0000  lisinopril (PRINIVIL,ZESTRIL) 5 MG tablet  Every 24 Hours Scheduled      02/16/20 1449    02/17/20 0000  digoxin (LANOXIN) 125 MCG tablet  Daily Digoxin      02/16/20 1449    02/16/20 1449  Discharge patient  Once      02/16/20 1449    02/16/20 1200  digoxin (LANOXIN) tablet 125 mcg  Daily Digoxin,   Status:  Discontinued    "   02/15/20 1052    02/16/20 0600  Basic Metabolic Panel  Morning Draw      02/15/20 1052    02/16/20 0600  CBC Auto Differential  PROCEDURE ONCE     Comments:  Discontinue After Heparin Stopped      02/16/20 0002    02/16/20 0000  carvedilol (COREG) 25 MG tablet  2 Times Daily With Meals      02/16/20 1449    02/16/20 0000  nitroglycerin (NITROSTAT) 0.4 MG SL tablet  Every 5 Minutes PRN      02/16/20 1449    02/16/20 0000  warfarin (COUMADIN) 6 MG tablet      02/16/20 1449    02/16/20 0000  Discharge Follow-up with Specified Provider: cardiology clinic; 2 Weeks      02/16/20 1449    02/16/20 0000  Ambulatory Referral to Anticoagulation Monitoring      02/16/20 1449    02/15/20 1800  warfarin (COUMADIN) tablet 6 mg  Daily Warfarin,   Status:  Discontinued      02/15/20 1233    02/15/20 1648  ECG 12 Lead  STAT      02/15/20 1647    02/15/20 1345  aPTT  Timed      02/15/20 0742                   Physician Progress Notes (last 48 hours) (Notes from 02/15/20 0951 through 02/17/20 0951)      Gavin Luke MD at 02/15/20 1101          CC: atrial fibrillation, cardiomyopathy    Interval History: Patient complains of lightheadedness while walking this morning. No fainting. BP low compared to baseline. No other acute events       Vital Signs  Temp:  [97.3 °F (36.3 °C)-97.8 °F (36.6 °C)] 97.8 °F (36.6 °C)  Heart Rate:  [] 89  Resp:  [18-25] 25  BP: ()/() 137/118    Intake/Output Summary (Last 24 hours) at 2/15/2020 1101  Last data filed at 2/15/2020 0618  Gross per 24 hour   Intake 357 ml   Output --   Net 357 ml     Flowsheet Rows      First Filed Value   Admission Height  175.3 cm (69\") Documented at 02/10/2020 0944   Admission Weight  (!) 157 kg (346 lb 8 oz) Documented at 02/10/2020 0944          PHYSICAL EXAM:  General: No acute distress  Resp:NL Rate, symmetric chest expansion,unlabored, clear  CV:irregularly irregular and tachycardic, NL PMI, NL S1 and S2, no Murmur, no gallop, no rub, No JVD. "   ABD:Nl sounds, no masses or tenderness, nondistended, no guarding or rebound  Neuro: alert,cooperative and oriented  Extr:Normal pedal pulses, No edema or cyanosis, moves all extremities      Results Review:    Results from last 7 days   Lab Units 02/14/20  0602   SODIUM mmol/L 137   POTASSIUM mmol/L 4.5   CHLORIDE mmol/L 99   CO2 mmol/L 24.3   BUN mg/dL 14   CREATININE mg/dL 1.32*   GLUCOSE mg/dL 108*   CALCIUM mg/dL 8.8     Results from last 7 days   Lab Units 02/10/20  1025   TROPONIN T ng/mL <0.010     Results from last 7 days   Lab Units 02/15/20  0559   WBC 10*3/mm3 5.80   HEMOGLOBIN g/dL 18.9*   HEMATOCRIT % 55.8*   PLATELETS 10*3/mm3 261     Results from last 7 days   Lab Units 02/15/20  0559 02/14/20  1320 02/14/20  0602  02/13/20  0603   INR  2.09*  --  1.72*  --  1.32*   APTT seconds 87.6* 72.6* 92.8*   < > 102.9*    < > = values in this interval not displayed.     Results from last 7 days   Lab Units 02/11/20  0559   CHOLESTEROL mg/dL 97     Results from last 7 days   Lab Units 02/14/20  0602   MAGNESIUM mg/dL 2.1     Results from last 7 days   Lab Units 02/11/20  0559   CHOLESTEROL mg/dL 97   TRIGLYCERIDES mg/dL 76   HDL CHOL mg/dL 37*   LDL CHOL mg/dL 45     I reviewed the patient's new clinical results.  I personally viewed and interpreted the patient's EKG/Telemetry data, labs reviewed         Medication Review:   Meds reviewed      lactated ringers 9 mL/hr   Pharmacy to dose warfarin        Assessment/Plan    1.   Persistent atrial fibrillation with rapid ventricular response  -Heart rate better controlled overall with episodes of high intermittently. Add digoxin  -[id number] score of 2 with left atrial appendage thrombus on KONRAD  -Started on heparin drip with Coumadin.  Continue Coumadin until INR is therapeutic.  INR therapeutic . DC heparin   -Increase Coreg dose to 25 mg p.o. twice daily     2.  Newly diagnosed severe systolic left ventricular dysfunction-probably from tachycardia induced  cardiomyopathy-EF 15-20%  -He is compensated and not in overt congestive heart failure  -Coreg 25 mg bid, cant increase lisinopril further because of hypotension     -consider ischemic work-up once patient is stable including coronary angiogram- will do as out patient      3.  Morbidly obese- BMI 52  -Consulteded nutrition services  -Need referral to bariatric surgery as well after discharge      4.  Hypertension  -Controlled     5.  Obstructive sleep apnea  -Continue home CPAP     6.  Medication noncompliance  -Patient counseled     7. Stage 2 CKD   -Stable creatinine     Holding dc today as patient was complaining of light headedness. BP lower side     Gavin Luke MD  02/15/20  11:01 AM              Electronically signed by Gavin Luke MD at 02/15/20 1106            Discharge Summary      Gavin Luke MD at 02/16/20 1120          Date of Admit: 2/10/2020    Date of Discharge:  2/16/2020      Discharge Diagnosis:  Problem List Items Addressed This Visit        Cardiovascular and Mediastinum    Atrial fibrillation with rapid ventricular response (CMS/HCC)    Relevant Medications    carvedilol (COREG) 25 MG tablet    digoxin (LANOXIN) 125 MCG tablet (Start on 2/17/2020)    nitroglycerin (NITROSTAT) 0.4 MG SL tablet    Other Relevant Orders    Ambulatory Referral to Anticoagulation Monitoring       Hematopoietic and Hemostatic    Anticoagulated on Coumadin - Primary    Relevant Orders    Ambulatory Referral to Anticoagulation Monitoring      Acute congestive systolic heart failure from nonischemic cardiomyopathy  Nonischemic cardiomyopathy  Atrial fibrillation with rapid ventricular response  Left atrial appendage clot  Morbid obesity  Obstructive sleep apnea  Hypertension    Hospital Course:   Mr. Moody is a 54 years old man who was admitted with diagnosis of acute congestive heart failure and atrial fibrillation with rapid ventricular response.  He came to the echo lab for evaluation of  low ventricular ejection fraction as he has had atrial fibrillation for a while.  He is EF was found out to be less than 20% and patient was having difficulty of breathing that prompted his admission.  He was subsequently placed on rate control medication.  Despite low EF he looked clinically compensated with no evidence of overt congestive heart failure or volume overload.  Adjustments were made to his medication to get better heart rate control and eventually it was controlled on Coreg 25 mg p.o. twice daily.  Lisinopril added and he can only tolerate 5 mg p.o. daily as he develops symptomatic hypotension with higher doses.   KONRAD guided cardioversion planned but cardioversion deferred as he was noted to have left atrial appendage clot.  He was started on anticoagulation with Coumadin with heparin bridging and finally get therapeutic.  He was continued on home CPAP.  His blood pressure which was hard to control actually was well controlled in the hospital pointing out patient medication noncompliance.  Flecainide discontinued as he developed cardiomyopathy.  I have seen and examined patient on day of discharge and he is hemodynamically stable and significantly improved.  Inpatient dietary consult made to help patient lose weight.  Discharge care including patient education and counseling took more than 30 minutes.    Procedures Performed     KONRAD    Consults     No orders found from 1/12/2020 to 2/11/2020.          Condition on discharge-significantly improved and stable      Discharge Medications     Discharge Medications      New Medications      Instructions Start Date   carvedilol 25 MG tablet  Commonly known as:  COREG   25 mg, Oral, 2 Times Daily With Meals      digoxin 125 MCG tablet  Commonly known as:  LANOXIN   125 mcg, Oral, Daily Digoxin   Start Date:  February 17, 2020     nitroglycerin 0.4 MG SL tablet  Commonly known as:  NITROSTAT   0.4 mg, Sublingual, Every 5 Minutes PRN, Take no more than 3 doses  "in 15 minutes.      warfarin 6 MG tablet  Commonly known as:  COUMADIN   TAKE 6 MG DAILY         Changes to Medications      Instructions Start Date   lisinopril 5 MG tablet  Commonly known as:  AMINATAZESTRIL  What changed:    · medication strength  · how much to take  · when to take this   5 mg, Oral, Every 24 Hours Scheduled   Start Date:  February 17, 2020        Continue These Medications      Instructions Start Date   atorvastatin 40 MG tablet  Commonly known as:  LIPITOR   40 mg, Oral, Nightly         Stop These Medications    albuterol (2.5 MG/3ML) 0.083% nebulizer solution  Commonly known as:  PROVENTIL     apixaban 5 MG tablet tablet  Commonly known as:  ELIQUIS     aspirin 325 MG tablet     cyclobenzaprine 10 MG tablet  Commonly known as:  FLEXERIL     fish oil 1000 MG capsule capsule     flecainide 100 MG tablet  Commonly known as:  TAMBOCOR     fluticasone 50 MCG/ACT nasal spray  Commonly known as:  FLONASE     HYDROcodone-acetaminophen  MG per tablet  Commonly known as:  NORCO     meloxicam 15 MG tablet  Commonly known as:  MOBIC     metoprolol tartrate 100 MG tablet  Commonly known as:  LOPRESSOR     montelukast 10 MG tablet  Commonly known as:  SINGULAIR     MULTIPLE VITAMIN PO     oxyCODONE-acetaminophen  MG per tablet  Commonly known as:  PERCOCET     Potassium 99 MG tablet     raNITIdine 300 MG tablet  Commonly known as:  ZANTAC     vitamin E 200 UNIT capsule              Activity at Discharge: As tolerated    Follow-up Appointments-see in cardiology clinic in 2 weeks  Additional Instructions for the Follow-ups that You Need to Schedule     Ambulatory Referral to Anticoagulation Monitoring   As directed       Select To DEPT SPEC to filter TO DEPT       Send INR reminders to the \"clinical pool\" of the TO DEPT     (ie:  [facility]  or [facility])     Discharge Follow-up with Specified Provider: cardiology clinic; 2 Weeks   As directed      To:  " cardiology clinic    Follow Up:  2 Weeks           Follow-up Information     Beatrice Rucker MD .    Specialty:  Internal Medicine  Contact information:  [address] [place] [address]. [address] [place] [address]  [phone]                   Discharge diet-AHA diet      DISCHARGE DISPOSITION: Home     Gavin Luke MD  02/16/20  2:49 PM        Electronically signed by Gavin Luke MD at 02/16/20 8209         All medication doses during the admission are shown, including meds that are no longer on order.   Scheduled Meds Sorted by Name   for Milton Moody as of 2/14/20 through 2/16/20     1 Day 3 Days 7 Days 10 Days < Today >    Legend:                          Discontinued    Completed    Future    MAR Hold     Other Encounter    Linked           Medications 02/14/20 02/15/20 02/16/20   carvedilol (COREG) tablet 18.75 mg   Dose: 18.75 mg  Freq: 2 Times Daily With Meals Route: PO  Start: 02/13/20 1000 End: 02/14/20 0736    Admin Instructions:   Give with food.    0736-D/C'd            carvedilol (COREG) tablet 25 mg   Dose: 25 mg  Freq: 2 Times Daily With Meals Route: PO  Start: 02/14/20 0900 End: 02/16/20 1856    Admin Instructions:   Give with food.    0847   1749        0942 [C]   1701) [C]        0822   1856-D/C'd        carvedilol (COREG) tablet 6.25 mg   Dose: 6.25 mg  Freq: 2 Times Daily With Meals Route: PO  Start: 02/11/20 1800 End: 02/13/20 0753    Admin Instructions:   Give with food.         chlorthalidone (HYGROTON) tablet 25 mg   Dose: 25 mg  Freq: Daily Route: PO  Start: 02/10/20 1500 End: 02/10/20 1535         digoxin (LANOXIN) injection 500 mcg   Dose: 500 mcg  Freq: Once Route: IV  Start: 02/15/20 1145 End: 02/15/20 1410    Admin Instructions:    Check and record heart rate. Use filter needle to withdraw dose from ampule. Dose may be pushed over 5 minutes.     1410 [C]             digoxin (LANOXIN) injection 500 mcg   Dose: 500 mcg  Freq: Once Route: IV  Start: 02/10/20 1045 End:  02/10/20 1109    Admin Instructions:    Check and record heart rate. Use filter needle to withdraw dose from ampule. Dose may be pushed over 5 minutes.         digoxin (LANOXIN) tablet 125 mcg   Dose: 125 mcg  Freq: Daily Digoxin Route: PO  Start: 02/16/20 1200 End: 02/16/20 1856    Admin Instructions:   Check and record heart rate.      1150   1856-D/C'd        famotidine (PEPCID) injection 20 mg   Dose: 20 mg  Freq: Once Route: IV  Start: 02/11/20 1015 End: 02/16/20 1856    Admin Instructions:   Dilute to 10 mL total volume and give IV push over 2 minutes.      1856-D/C'd          lisinopril (PRINIVIL,ZESTRIL) tablet 10 mg   Dose: 10 mg  Freq: Every 24 Hours Scheduled Route: PO  Start: 02/11/20 0900 End: 02/12/20 0801         lisinopril (PRINIVIL,ZESTRIL) tablet 40 mg   Dose: 40 mg  Freq: Every 24 Hours Scheduled Route: PO  Start: 02/10/20 1200 End: 02/10/20 1535         lisinopril (PRINIVIL,ZESTRIL) tablet 5 mg   Dose: 5 mg  Freq: Every 24 Hours Scheduled Route: PO  Start: 02/12/20 0900 End: 02/16/20 1856    0847          0942          0822   1856-D/C'd        metoprolol tartrate (LOPRESSOR) tablet 25 mg   Dose: 25 mg  Freq: Every 6 Hours Scheduled Route: PO  Start: 02/10/20 1800 End: 02/11/20 1644    Admin Instructions:   Hold for SBP <100         metoprolol tartrate (LOPRESSOR) tablet 50 mg   Dose: 50 mg  Freq: Every 6 Hours Scheduled Route: PO  Start: 02/10/20 1200 End: 02/10/20 1535         perflutren (DEFINITY) 8.476 mg in sodium chloride 0.9 % 10 mL injection   Dose: 1.5 mL  Freq: Once Route: IV  Start: 02/10/20 0745 End: 02/10/20 0745    Admin Instructions:   Mix 1.3 mL of mechanically activated Definity with 8.7 mL of normal saline. A total of 1.5 mL of the resulting Definity solution was administered.         sodium chloride 0.9 % flush 10 mL   Dose: 10 mL  Freq: Every 12 Hours Scheduled Route: IV  Start: 02/10/20 1100 End: 02/16/20 0757    0847   2011        0943   2025        0757-D/C'd          sodium  chloride 0.9 % flush 3 mL   Dose: 3 mL  Freq: Every 12 Hours Scheduled Route: IV  Start: 02/11/20 1015 End: 02/16/20 0757    0847 [C]   (2011) 0963   2025        0757-D/C'd          warfarin (COUMADIN) tablet 5 mg   Dose: 5 mg  Freq: Daily Warfarin Route: PO  Indications of Use: ATRIAL FIBRILLATION  Start: 02/10/20 1800 End: 02/11/20 1244    Admin Instructions:   Food-Drug Interaction  If INR Greater Than Target, Contact Pharmacy Prior to Giving Dose.  Acutely Hazardous. Waste BOTH Residual Medication and/or Empty Package. .    Order specific questions:   Target INR 2 - 3         warfarin (COUMADIN) tablet 6 mg   Dose: 6 mg  Freq: Daily Warfarin Route: PO  Indications of Use: ATRIAL FIBRILLATION  Start: 02/15/20 1800 End: 02/16/20 1856    Admin Instructions:   Food-Drug Interaction  If INR Greater Than Target, Contact Pharmacy Prior to Giving Dose.  Acutely Hazardous. Waste BOTH Residual Medication and/or Empty Package. .    Order specific questions:   Target INR 2 - 3     1701          1856-D/C'd          warfarin (COUMADIN) tablet 7.5 mg   Dose: 7.5 mg  Freq: Daily Warfarin Route: PO  Indications of Use: ATRIAL FIBRILLATION  Start: 02/11/20 1800 End: 02/15/20 1233    Admin Instructions:   Food-Drug Interaction  If INR Greater Than Target, Contact Pharmacy Prior to Giving Dose.  Acutely Hazardous. Waste BOTH Residual Medication and/or Empty Package. .    Order specific questions:   Target INR 2 - 3    1749          1233-D/C'd           Medications 02/14/20 02/15/20 02/16/20       Continuous Meds Sorted by Name   for Milton Moody as of 2/14/20 through 2/16/20    Legend:                 Discontinued    Completed    Future    MAR Hold     Other Encounter    Linked           Medications 02/14/20 02/15/20 02/16/20   heparin 20707 units/250 mL (100 units/mL) in 0.45 % NaCl infusion   Rate: 9.99 mL/hr Dose: 6.21 Units/kg/hr  Weight Dosing Info: 161 kg  Freq: Titrated Route: IV  Indications of Use: ATRIAL  FIBRILLATION  Last Dose: Stopped (02/15/20 0741)  Start: 02/10/20 1115 End: 02/15/20 1052    Admin Instructions:   Weight Based Heparin Nomogram Cardiac or Other Not VTE: Initial Heparin Infusion 12 units/kg/hr    PTT  Less Than 54 sec:  Increase Dose By 3 units/kg/hr.  PTT 54-63 sec: Increase Dose By 1 units/kg/hr.  PTT 64-85 sec: No Dose Change.  PTT  sec: Decrease Dose By 2 units/kg/hr.  PTT > 104 sec:  Hold Infusion 1 hour.  Decrease Dose By 3 units/kg/hr. Repeat PTT in 6 hours.  If PTT Remains Greater Than 104 Stop Heparin Drip & Contact Provider    0227   [id number]     2011 0702   1052-D/C'd         lactated ringers infusion   Freq: Continuous PRN Route: IV  Start: 02/11/20 0750 End: 02/11/20 0833         lactated ringers infusion   Rate: 9 mL/hr Dose: 9 mL/hr  Freq: Continuous Route: IV  Start: 02/11/20 1015 End: 02/16/20 1856      1856-D/C'd          Pharmacy to dose warfarin   Freq: Continuous PRN Route: XX  PRN Reason: Consult  Indications of Use: ATRIAL FIBRILLATION  Start: 02/10/20 1015 End: 02/10/20 1018    Admin Instructions:   If INR Greater Than Target, Contact Pharmacy Prior to Giving Dose.  Acutely Hazardous. Waste BOTH Residual Medication and/or Empty Package. .    Order specific questions:   Target INR 2 - 3         Pharmacy to dose warfarin   Freq: Continuous PRN Route: XX  PRN Reason: Consult  Indications of Use: ATRIAL FIBRILLATION  Start: 02/10/20 1015 End: 02/16/20 1856    Admin Instructions:   If INR Greater Than Target, Contact Pharmacy Prior to Giving Dose.  Acutely Hazardous. Waste BOTH Residual Medication and/or Empty Package. .    Order specific questions:   Target INR 2 - 3      1856-D/C'd          Medications 02/14/20 02/15/20 02/16/20       PRN Meds Sorted by Name   for Milton Moody as of 2/14/20 through 2/16/20    Legend:                          Discontinued    Completed    Future    MAR Hold     Other Encounter    Linked           Medications 02/14/20 02/15/20  02/16/20   acetaminophen (TYLENOL) tablet 650 mg   Dose: 650 mg  Freq: Every 4 Hours PRN Route: PO  PRN Reason: Mild Pain   Start: 02/10/20 1012 End: 02/16/20 1856    Admin Instructions:   Do not exceed 4 grams of acetaminophen in a 24 hr period.    If given for pain, use the following pain scale:   Mild Pain = Pain Score of 1-3, CPOT 1-2  Moderate Pain = Pain Score of 4-6, CPOT 3-4  Severe Pain = Pain Score of 7-10, CPOT 5-8     0946          1856-D/C'd          Or  acetaminophen (TYLENOL) 160 MG/5ML solution 650 mg   Dose: 650 mg  Freq: Every 4 Hours PRN Route: PO  PRN Reason: Mild Pain   Start: 02/10/20 1012 End: 02/16/20 1856    Admin Instructions:   Do not exceed 4 grams of acetaminophen in a 24 hr period.    If given for pain, use the following pain scale:   Mild Pain = Pain Score of 1-3, CPOT 1-2  Moderate Pain = Pain Score of 4-6, CPOT 3-4  Severe Pain = Pain Score of 7-10, CPOT 5-8             1856-D/C'd          Or  acetaminophen (TYLENOL) suppository 650 mg   Dose: 650 mg  Freq: Every 4 Hours PRN Route: RE  PRN Reason: Mild Pain   Start: 02/10/20 1012 End: 02/16/20 1856    Admin Instructions:   Do not exceed 4 grams of acetaminophen in a 24 hr period.    If given for pain, use the following pain scale:   Mild Pain = Pain Score of 1-3, CPOT 1-2  Moderate Pain = Pain Score of 4-6, CPOT 3-4  Severe Pain = Pain Score of 7-10, CPOT 5-8             1856-D/C'd          glycopyrrolate (ROBINUL) injection   Freq: As Needed Route: IV  Start: 02/11/20 0756 End: 02/11/20 0833         ketamine (KETALAR) injection   Freq: As Needed  Start: 02/11/20 0759 End: 02/11/20 0833         lactated ringers infusion   Freq: Continuous PRN Route: IV  Start: 02/11/20 0750 End: 02/11/20 0833         lidocaine PF 1% (XYLOCAINE) injection 0.5 mL   Dose: 0.5 mL  Freq: Once As Needed Route: IJ  PRN Comment: IV Start  Start: 02/11/20 0922 End: 02/16/20 1856      1856-D/C'd          midazolam (VERSED) injection   Freq: As Needed Route:  IV  Start: 02/11/20 0756 End: 02/11/20 0833         nitroglycerin (NITROSTAT) SL tablet 0.4 mg   Dose: 0.4 mg  Freq: Every 5 Minutes PRN Route: SL  PRN Reason: Chest Pain  PRN Comment: Only if SBP Greater Than 100  Start: 02/10/20 1012 End: 02/16/20 1856    Admin Instructions:   If Pain Unrelieved After 3 Doses Notify MD Moya6-D/C'd          Pharmacy to dose warfarin   Freq: Continuous PRN Route: XX  PRN Reason: Consult  Indications of Use: ATRIAL FIBRILLATION  Start: 02/10/20 1015 End: 02/10/20 1018    Admin Instructions:   If INR Greater Than Target, Contact Pharmacy Prior to Giving Dose.  Acutely Hazardous. Waste BOTH Residual Medication and/or Empty Package. .    Order specific questions:   Target INR 2 - 3         Pharmacy to dose warfarin   Freq: Continuous PRN Route: XX  PRN Reason: Consult  Indications of Use: ATRIAL FIBRILLATION  Start: 02/10/20 1015 End: 02/16/20 1856    Admin Instructions:   If INR Greater Than Target, Contact Pharmacy Prior to Giving Dose. Acutely Hazardous. Waste BOTH Residual Medication and/or Empty Package. .    Order specific questions:   Target INR 2 - 3      Parminder-D/C'd          phenylephrine (CASSI-SYNEPHRINE) injection   Freq: As Needed  Start: 02/11/20 0759 End: 02/11/20 0833         Propofol (DIPRIVAN) injection   Freq: As Needed Route: IV  Start: 02/11/20 0759 End: 02/11/20 0833         sodium chloride 0.9 % flush 10 mL   Dose: 10 mL  Freq: As Needed Route: IV  PRN Reason: Line Care  Start: 02/10/20 1011 End: 02/16/20 0757      0757-D/C'd          sodium chloride 0.9 % flush 3-10 mL   Dose: 3-10 mL  Freq: As Needed Route: IV  PRN Reason: Line Care  Start: 02/11/20 0922 End: 02/16/20 1856      1856-D/C'd          Medications 02/14/20 02/15/20 02/16/20

## 2020-02-17 NOTE — OUTREACH NOTE
Prep Survey      Responses   Facility patient discharged from?  [place]   Is patient eligible?  Yes   Discharge diagnosis  Atrial fibrillation with rapid ventricular response   Does the patient have one of the following disease processes/diagnoses(primary or secondary)?  CHF   Does the patient have Home health ordered?  No   Is there a DME ordered?  No   Medication alerts for this patient  Digoxin and Coumadin    Prep survey completed?  Yes          Dawna Elaine RN

## 2020-02-18 ENCOUNTER — TELEPHONE (OUTPATIENT)
Dept: PHARMACY | Facility: HOSPITAL | Age: 55
End: 2020-02-18

## 2020-02-18 ENCOUNTER — READMISSION MANAGEMENT (OUTPATIENT)
Dept: CALL CENTER | Facility: HOSPITAL | Age: 55
End: 2020-02-18

## 2020-02-18 NOTE — TELEPHONE ENCOUNTER
Mr. Moody was contacted by a [facility] technician yesterday for scheduling. Although INR check was strongly advised for today, he reportedly declined, indicating it is not possible for him to visit clinic until Fri, 2/21/20. Noted diagnosis of Afib and left atrial appendage thrombus.     See below for review of warfarin dosing and INRs:    Date        INR        Warfarin Dose  2/10        1.24        5 mg  2/11        1.15        7.5 mg  2/12        1.14        7.5 mg  2/13        1.32        7.5 mg  2/14        1.72        7.5 mg  2/15        2.09        6 mg  2/16        2.18        6 mg daily (prescribed on discharge)    Mr. Moody received 41 mg of warfarin over his first 6 days of warfarin therapy, following which is INR was at the low end of his therapeutic range (2-3). The dose of 6 mg daily prescribed at discharge would equal a weekly dose of 42 mg.     Contacted Mr. Moody on 2/18/20 - After our conversation, he is agreeable to visiting the [facility] tomorrow, 2/19/20.

## 2020-02-18 NOTE — OUTREACH NOTE
CHF Week 1 Survey      Responses   Facility patient discharged from?  [place]   Does the patient have one of the following disease processes/diagnoses(primary or secondary)?  CHF   Is there a successful TCM telephone encounter documented?  No   CHF Week 1 attempt successful?  Yes   Call start time  1726   Call end time  1729   Discharge diagnosis  Atrial fibrillation with rapid ventricular response   Medication alerts for this patient  Digoxin and Coumadin    Meds reviewed with patient/caregiver?  Yes   Is the patient having any side effects they believe may be caused by any medication additions or changes?  No   Does the patient have all medications ordered at discharge?  Yes   Is the patient taking all medications as directed (includes completed medication regime)?  Yes   Does the patient have a primary care provider?   Yes   Does the patient have an appointment with their PCP within 7 days of discharge?  Yes   Comments regarding PCP  Has a followup tommorrow   Has the patient kept scheduled appointments due by today?  N/A   Has home health visited the patient within 72 hours of discharge?  N/A   Psychosocial issues?  No   Did the patient receive a copy of their discharge instructions?  Yes   Nursing interventions  Reviewed instructions with patient   What is the patient's perception of their health status since discharge?  Improving   Nursing interventions  Nurse provided patient education   Daily weight interventions  Education provided on importance of daily weight   Is the patient able to teach back Heart Failure diet management?  Yes   Is the patient able to teach back Heart Failure Zones?  Yes   Is the patient able to teach back signs and symptoms of worsening condition? (i.e. weight gain, shortness of air, etc.)  Yes   Is the patient/caregiver able to teach back the hierarchy of who to call/visit for symptoms/problems? PCP, Specialist, Home health nurse, Urgent Care, ED, 911  Yes    CHF Week 1 call  completed?  Yes          Gil Hernandez RN

## 2020-02-19 ENCOUNTER — LAB (OUTPATIENT)
Dept: LAB | Facility: HOSPITAL | Age: 55
End: 2020-02-19

## 2020-02-19 ENCOUNTER — TELEPHONE (OUTPATIENT)
Dept: CARDIOLOGY | Facility: CLINIC | Age: 55
End: 2020-02-19

## 2020-02-19 ENCOUNTER — ANTICOAGULATION VISIT (OUTPATIENT)
Dept: PHARMACY | Facility: HOSPITAL | Age: 55
End: 2020-02-19

## 2020-02-19 DIAGNOSIS — R06.02 EXERTIONAL SHORTNESS OF BREATH: ICD-10-CM

## 2020-02-19 DIAGNOSIS — I48.91 ATRIAL FIBRILLATION WITH RAPID VENTRICULAR RESPONSE (HCC): ICD-10-CM

## 2020-02-19 DIAGNOSIS — Z79.01 ANTICOAGULATED ON COUMADIN: ICD-10-CM

## 2020-02-19 LAB
INR PPP: 2.4 (ref 0.91–1.09)
NT-PROBNP SERPL-MCNC: 118.7 PG/ML (ref 5–900)
PROTHROMBIN TIME: 28.8 SECONDS (ref 10–13.8)
T4 FREE SERPL-MCNC: 1.19 NG/DL (ref 0.93–1.7)

## 2020-02-19 PROCEDURE — 36415 COLL VENOUS BLD VENIPUNCTURE: CPT

## 2020-02-19 PROCEDURE — 85610 PROTHROMBIN TIME: CPT

## 2020-02-19 PROCEDURE — G0463 HOSPITAL OUTPT CLINIC VISIT: HCPCS

## 2020-02-19 PROCEDURE — 83880 ASSAY OF NATRIURETIC PEPTIDE: CPT

## 2020-02-19 PROCEDURE — 84439 ASSAY OF FREE THYROXINE: CPT

## 2020-02-19 PROCEDURE — 36416 COLLJ CAPILLARY BLOOD SPEC: CPT

## 2020-02-19 NOTE — PROGRESS NOTES
Anticoagulation Clinic Progress Note  Anticoagulation Summary  As of 2020    INR goal:   2.0-3.0   TTR:   --   INR used for dosin.4 (2020)   Warfarin maintenance plan:   6 mg every day   Weekly warfarin total:   42 mg   Plan last modified:   Mary Alarcon RPH (2020)   Next INR check:   2020   Target end date:   Indefinite    Indications    Atrial fibrillation with rapid ventricular response (CMS/HCC) [I48.91]  Anticoagulated on Coumadin [Z79.01]             Anticoagulation Episode Summary     INR check location:       Preferred lab:       Send INR reminders to:   [facility] CLINICAL [place]    Comments:   New to warfarin 2/10; See  tele note for warfarin dosing hx. Afib + left atrial appendage thrombus at present; pending cardioversion upon resolution of CARRI thrombus.      Anticoagulation Care Providers     Provider Role Specialty Phone number    Gavin Luke MD Referring Cardiology [phone]          Clinic Interview:      Education:  Milton Moody is a new start in the [facility]. We discussed the followin) Warfarin's indication, mechanism, and dosing  2) Enforced the importance of taking warfarin as instructed and at the same time every day, preferably in the evening so that we can make dose adjustments more easily following subsequent clinic visits  3) What he should do about a missed dose; pts can take missed doses within about 12 hours of their usual scheduled dose, but he was instructed on the importance of not doubling up on doses unless told to do so by the [facility]  4) Explained possible side effects of warfarin therapy, including increased risk of bleeding, s/sx of bleeding and s/sx of any additional clots/PE/CVA.   5) Discussed monitoring of warfarin, the INR, goal INR range, and the frequency of monitoring  6) Reviewed drug/food/tobacco/EtOH interactions and provided written information covering these topics in more  detail, explaining that green, leafy vegetables interact most heavily with warfarin  7) Instructed the pt not to take or discontinue any medications without informing his physician/pharmacist and reminded him to inform us of any dietary changes, as well  8) Explained that he would be coming into the clinic more frequently in these first few weeks of therapy as we try to adjust his dose and achieve a therapeutic INR x 2 consecutive readings. Once that is achieved, patient will follow up in clinic every 4 weeks, on average.    He stated no problems with transportation or scheduling clinic appts in this manner. he expressed understanding of the information provided and has no additional questions at this time.    Milton Moody was presented with a copy of the Patients Rights and Responsibilities. he expressed verbal consent and agreement to receive care in the [facility] under the current collaborative care agreement with [facility].       INR History:  See chart for inpatient data    Plan:  1. INR is Therapeutic today- see above in Anticoagulation Summary.   Will instruct Milton Moody to Continue their warfarin regimen- see above in Anticoagulation Summary.  2. Follow up in 5 days  3. Patient declines warfarin refills.  4. Verbal and written information provided. Patient expresses understanding and has no further questions at this time.    Mary Alarcon [facility]

## 2020-02-20 NOTE — TELEPHONE ENCOUNTER
I am the on-call provider and received a page from the patient wanting to know if he can use Afrin for some nasal congestion with his warfarin.  I recommended that he not use any decongestants like Afrin with recent rapid atrial fibrillation.  I recommended he try to use just a nasal saline rinse and if he continues to experience issues to discuss with his primary care physician. He demonstrated understanding.

## 2020-02-24 ENCOUNTER — ANTICOAGULATION VISIT (OUTPATIENT)
Dept: PHARMACY | Facility: HOSPITAL | Age: 55
End: 2020-02-24

## 2020-02-24 DIAGNOSIS — I48.91 ATRIAL FIBRILLATION WITH RAPID VENTRICULAR RESPONSE (HCC): ICD-10-CM

## 2020-02-24 DIAGNOSIS — Z79.01 ANTICOAGULATED ON COUMADIN: ICD-10-CM

## 2020-02-24 LAB
INR PPP: 2.6 (ref 0.91–1.09)
PROTHROMBIN TIME: 30.7 SECONDS (ref 10–13.8)

## 2020-02-24 PROCEDURE — 36416 COLLJ CAPILLARY BLOOD SPEC: CPT

## 2020-02-24 PROCEDURE — 85610 PROTHROMBIN TIME: CPT

## 2020-02-24 NOTE — PROGRESS NOTES
I have supervised and reviewed the notes, assessments, and/or procedures performed by our PharmD Candidate. The documented assessment and plan were developed cooperatively. I concur with the documentation of this patient encounter.    Mary Alarcon RP

## 2020-02-24 NOTE — PROGRESS NOTES
Anticoagulation Clinic Progress Note    Anticoagulation Summary  As of 2020    INR goal:   2.0-3.0   TTR:   --   INR used for dosin.6 (2020)   Warfarin maintenance plan:   6 mg every day   Weekly warfarin total:   42 mg   No change documented:   Otis Gaitan, Pharmacy Intern   Plan last modified:   Mary Alarcon RPH (2020)   Next INR check:   3/4/2020   Target end date:   Indefinite    Indications    Atrial fibrillation with rapid ventricular response (CMS/HCC) [I48.91]  Anticoagulated on Coumadin [Z79.01]             Anticoagulation Episode Summary     INR check location:       Preferred lab:       Send INR reminders to:    [facility] CLINICAL [place]    Comments:   New to warfarin 2/10; See  tele note for warfarin dosing hx. Afib + left atrial appendage thrombus at present; pending cardioversion upon resolution of CARRI thrombus.      Anticoagulation Care Providers     Provider Role Specialty Phone number    Gavin Luke MD Referring Cardiology [phone]          Clinic Interview:  Patient Findings     Positives:   Change in medications    Negatives:   Signs/symptoms of thrombosis, Signs/symptoms of bleeding,   Laboratory test error suspected, Change in health, Change in alcohol use,   Change in activity, Upcoming invasive procedure, Emergency department   visit, Upcoming dental procedure, Missed doses, Extra doses, Change in   diet/appetite, Hospital admission, Bruising, Other complaints    Comments:   Pt told by APRN to use saline nasal spray for sinuses, stop   Afrin.      Clinical Outcomes     Negatives:   Major bleeding event, Thromboembolic event,   Anticoagulation-related hospital admission, Anticoagulation-related ED   visit, Anticoagulation-related fatality    Comments:   Pt told by APRN to use saline nasal spray for sinuses, stop   Afrin.        INR History:  Anticoagulation Monitoring 2020   INR 2.4 2.6   INR Date 2020   INR Goal  2.0-3.0 2.0-3.0   Trend - Same   Last Week Total 46.5 mg 42 mg   Next Week Total 42 mg 42 mg   Sun 6 mg 6 mg   Mon - 6 mg   Tue - 6 mg   Wed 6 mg 6 mg   Thu 6 mg 6 mg   Fri 6 mg 6 mg   Sat 6 mg 6 mg   Visit Report - -       Plan:  1. INR is Therapeutic today- see above in Anticoagulation Summary.  Will instruct Milton Moody to Continue their warfarin regimen- see above in Anticoagulation Summary.  2. Follow up in 1.5 weeks  3. Patient declines warfarin refills.  4. Verbal and written information provided. Patient expresses understanding and has no further questions at this time.    Otis Gaitan, Pharmacy Intern

## 2020-02-25 ENCOUNTER — READMISSION MANAGEMENT (OUTPATIENT)
Dept: CALL CENTER | Facility: HOSPITAL | Age: 55
End: 2020-02-25

## 2020-02-25 NOTE — OUTREACH NOTE
CHF Week 2 Survey      Responses   Facility patient discharged from?  [place]   Does the patient have one of the following disease processes/diagnoses(primary or secondary)?  CHF   Week 2 attempt successful?  No   Unsuccessful attempts  Attempt 1          Liz Wallace RN

## 2020-02-26 ENCOUNTER — READMISSION MANAGEMENT (OUTPATIENT)
Dept: CALL CENTER | Facility: HOSPITAL | Age: 55
End: 2020-02-26

## 2020-02-26 NOTE — OUTREACH NOTE
CHF Week 2 Survey      Responses   Facility patient discharged from?  [place]   Does the patient have one of the following disease processes/diagnoses(primary or secondary)?  CHF   Week 2 attempt successful?  Yes   Call start time  1530   Call end time  1532   Discharge diagnosis  Atrial fibrillation with rapid ventricular response   Meds reviewed with patient/caregiver?  Yes   Is the patient having any side effects they believe may be caused by any medication additions or changes?  No   Does the patient have all medications ordered at discharge?  Yes   Is the patient taking all medications as directed (includes completed medication regime)?  Yes   Does the patient have a primary care provider?   Yes   Does the patient have an appointment with their PCP within 7 days of discharge?  Yes   Has the patient kept scheduled appointments due by today?  Yes   Has home health visited the patient within 72 hours of discharge?  N/A   Psychosocial issues?  No   Did the patient receive a copy of their discharge instructions?  Yes   Nursing interventions  Reviewed instructions with patient   What is the patient's perception of their health status since discharge?  Improving   Nursing interventions  Nurse provided patient education   Is the patient weighing daily?  Yes   Does the patient have scales?  Yes   Daily weight interventions  Education provided on importance of daily weight   Is the patient able to teach back Heart Failure diet management?  Yes   Is the patient able to teach back Heart Failure Zones?  Yes   Is the patient able to teach back signs and symptoms of worsening condition? (i.e. weight gain, shortness of air, etc.)  Yes   Is the patient/caregiver able to teach back the hierarchy of who to call/visit for symptoms/problems? PCP, Specialist, Home health nurse, Urgent Care, ED, 911  Yes   Additional teach back comments  He is ambulating well, encouraged daily weights. Avoid salt, attend appts.   CHF Week 2 call  completed?  Yes          Liz Wallace RN

## 2020-03-04 ENCOUNTER — ANTICOAGULATION VISIT (OUTPATIENT)
Dept: PHARMACY | Facility: HOSPITAL | Age: 55
End: 2020-03-04

## 2020-03-04 ENCOUNTER — OFFICE VISIT (OUTPATIENT)
Dept: CARDIOLOGY | Facility: CLINIC | Age: 55
End: 2020-03-04

## 2020-03-04 ENCOUNTER — LAB (OUTPATIENT)
Dept: LAB | Facility: HOSPITAL | Age: 55
End: 2020-03-04

## 2020-03-04 ENCOUNTER — READMISSION MANAGEMENT (OUTPATIENT)
Dept: CALL CENTER | Facility: HOSPITAL | Age: 55
End: 2020-03-04

## 2020-03-04 VITALS
HEART RATE: 90 BPM | BODY MASS INDEX: 46.65 KG/M2 | HEIGHT: 69 IN | DIASTOLIC BLOOD PRESSURE: 64 MMHG | WEIGHT: 315 LBS | SYSTOLIC BLOOD PRESSURE: 98 MMHG

## 2020-03-04 DIAGNOSIS — E66.01 MORBIDLY OBESE (HCC): ICD-10-CM

## 2020-03-04 DIAGNOSIS — I48.19 ATRIAL FIBRILLATION, PERSISTENT (HCC): ICD-10-CM

## 2020-03-04 DIAGNOSIS — I10 ESSENTIAL HYPERTENSION: ICD-10-CM

## 2020-03-04 DIAGNOSIS — G47.33 OBSTRUCTIVE SLEEP APNEA OF ADULT: ICD-10-CM

## 2020-03-04 DIAGNOSIS — I42.8 NICM (NONISCHEMIC CARDIOMYOPATHY) (HCC): ICD-10-CM

## 2020-03-04 DIAGNOSIS — Z79.01 ANTICOAGULATED ON COUMADIN: ICD-10-CM

## 2020-03-04 DIAGNOSIS — I10 ESSENTIAL HYPERTENSION: Primary | ICD-10-CM

## 2020-03-04 DIAGNOSIS — E78.2 MIXED HYPERLIPIDEMIA: ICD-10-CM

## 2020-03-04 DIAGNOSIS — I48.91 ATRIAL FIBRILLATION WITH RAPID VENTRICULAR RESPONSE (HCC): ICD-10-CM

## 2020-03-04 DIAGNOSIS — N18.2 CKD (CHRONIC KIDNEY DISEASE), STAGE II: ICD-10-CM

## 2020-03-04 PROBLEM — N18.30 CKD (CHRONIC KIDNEY DISEASE) STAGE 3, GFR 30-59 ML/MIN: Status: ACTIVE | Noted: 2020-03-04

## 2020-03-04 LAB
ANION GAP SERPL CALCULATED.3IONS-SCNC: 10.8 MMOL/L (ref 5–15)
BUN BLD-MCNC: 12 MG/DL (ref 6–20)
BUN/CREAT SERPL: 9.7 (ref 7–25)
CALCIUM SPEC-SCNC: 9.1 MG/DL (ref 8.6–10.5)
CHLORIDE SERPL-SCNC: 101 MMOL/L (ref 98–107)
CO2 SERPL-SCNC: 22.2 MMOL/L (ref 22–29)
CREAT BLD-MCNC: 1.24 MG/DL (ref 0.76–1.27)
DIGOXIN SERPL-MCNC: 0.3 NG/ML (ref 0.6–1.2)
GFR SERPL CREATININE-BSD FRML MDRD: 74 ML/MIN/1.73
GLUCOSE BLD-MCNC: 103 MG/DL (ref 65–99)
INR PPP: 2.3 (ref 0.91–1.09)
POTASSIUM BLD-SCNC: 4.7 MMOL/L (ref 3.5–5.2)
PROTHROMBIN TIME: 27.9 SECONDS (ref 10–13.8)
SODIUM BLD-SCNC: 134 MMOL/L (ref 136–145)

## 2020-03-04 PROCEDURE — 80048 BASIC METABOLIC PNL TOTAL CA: CPT

## 2020-03-04 PROCEDURE — 80162 ASSAY OF DIGOXIN TOTAL: CPT

## 2020-03-04 PROCEDURE — 85610 PROTHROMBIN TIME: CPT

## 2020-03-04 PROCEDURE — 36415 COLL VENOUS BLD VENIPUNCTURE: CPT

## 2020-03-04 PROCEDURE — 99214 OFFICE O/P EST MOD 30 MIN: CPT | Performed by: INTERNAL MEDICINE

## 2020-03-04 PROCEDURE — 93000 ELECTROCARDIOGRAM COMPLETE: CPT | Performed by: INTERNAL MEDICINE

## 2020-03-04 PROCEDURE — 36416 COLLJ CAPILLARY BLOOD SPEC: CPT

## 2020-03-04 RX ORDER — WARFARIN SODIUM 6 MG/1
TABLET ORAL
Qty: 90 TABLET | Refills: 3 | Status: SHIPPED | OUTPATIENT
Start: 2020-03-04 | End: 2020-08-11

## 2020-03-04 NOTE — OUTREACH NOTE
CHF Week 3 Survey      Responses   [facility] [place] patient discharged from?  [place]   Does the patient have one of the following disease processes/diagnoses(primary or secondary)?  CHF   Week 3 attempt successful?  Yes   Call start time  1702   Call end time  1705   Discharge diagnosis  Atrial fibrillation with rapid ventricular response   Meds reviewed with patient/caregiver?  Yes   Is the patient taking all medications as directed (includes completed medication regime)?  Yes   Has the patient kept scheduled appointments due by today?  Yes   What is the patient's perception of their health status since discharge?  Improving   Nursing interventions  Nurse provided patient education   Is the patient weighing daily?  Yes   Is the patient able to teach back Heart Failure Zones?  Yes [Green zone]   If the patient is a current smoker, are they able to teach back resources for cessation?  -- [Nonsmoker]   CHF Week 3 call completed?  Yes          Kathy Gutierrez RN

## 2020-03-04 NOTE — PROGRESS NOTES
Anticoagulation Clinic Progress Note    Anticoagulation Summary  As of 3/4/2020    INR goal:   2.0-3.0   TTR:   100.0 % (1 wk)   INR used for dosin.3 (3/4/2020)   Warfarin maintenance plan:   6 mg every day   Weekly warfarin total:   42 mg   No change documented:   Jeanie Briceno   Plan last modified:   Mary Alarcon RPH (2020)   Next INR check:   3/18/2020   Priority:   Maintenance   Target end date:   Indefinite    Indications    Atrial fibrillation with rapid ventricular response (CMS/HCC) [I48.91]  Anticoagulated on Coumadin [Z79.01]             Anticoagulation Episode Summary     INR check location:       Preferred lab:       Send INR reminders to:   [facility]    Comments:   New to warfarin 2/10; See  tele note for warfarin dosing hx. Afib + left atrial appendage thrombus at present; pending cardioversion upon resolution of CARRI thrombus.      Anticoagulation Care Providers     Provider Role Specialty Phone number    Gavin Luke MD Referring Cardiology [phone]          Clinic Interview:      INR History:  Anticoagulation Monitoring 2020 2020 3/4/2020   INR 2.4 2.6 2.3   INR Date 2020 2020 3/4/2020   INR Goal 2.0-3.0 2.0-3.0 2.0-3.0   Trend - Same Same   Last Week Total 46.5 mg 42 mg 42 mg   Next Week Total 42 mg 42 mg 42 mg   Sun 6 mg 6 mg 6 mg   Mon - 6 mg 6 mg   Tue - 6 mg 6 mg   Wed 6 mg 6 mg 6 mg   Thu 6 mg 6 mg 6 mg   Fri 6 mg 6 mg 6 mg   Sat 6 mg 6 mg 6 mg   Visit Report - - -   Some recent data might be hidden       Plan:  1. INR is therapeutic today- see above in Anticoagulation Summary.   Will instruct Milton Moody to continue their warfarin regimen- see above in Anticoagulation Summary.  2. Follow up in 2 weeks.  3. Patient declines warfarin refills.  4. Verbal and written information provided. Patient expresses understanding and has no further questions at this time.    Jeanie Briceno

## 2020-03-04 NOTE — PROGRESS NOTES
PATIENTINFORMATION    Date of Office Visit: 2020  Encounter Provider: Gavin Luke MD  Place of Service: [facility] CARDIOLOGY  Patient Name: Milton Moody  : 1965    Subjective:     Encounter Date:2020      Patient ID: Milton Moody is a 54 y.o. male.    Chief Complaint   Patient presents with   • Follow-up     HPI  Mr. Moody is a 54 years old man with past medical history of nonischemic cardiomyopathy probably due to tachycardia induced cardiomyopathy, persistent atrial fibrillation, hypertension uncontrolled, morbid obesity and obstructive sleep apnea and medication noncompliance came to cardiology clinic for post hospital discharge follow-up.  He was admitted with CHF exacerbation and A. fib with RVR and hypertension.  Cardioversion deferred because he had evidence for left atrial appendage thrombus on KONRAD.  Medical therapy was optimized and he was released on Coumadin.  He denied any significant palpitations, orthopnea, PND, extremity swelling since after discharge.  He reports significant improvement of breathing and he has been walking regularly and participating in physical therapy for his right shoulder only with some shortness of breath.  He checks his blood pressure at home and reports systolic being in the 120s and his heart rate mostly being in the 70s and 80s.  He denied any presyncope or syncope or bleeding from anybody site.  He admits feeling tired from time to time.  He reports compliance with home medications.  And he reports losing 3 pounds since hospital discharge.  No significant new complaints today    ROS   All systems reviewed and negative except as noted in HPI.    Past Medical History:   Diagnosis Date   • Abnormal ECG    • Asthma    • Atrial fibrillation (CMS/[facility])    • Chest pain    • CKD (chronic kidney disease) stage 3, GFR 30-59 ml/min (CMS/HCC) 3/4/2020   • Hyperlipidemia    • Hypertension    • NICM (nonischemic cardiomyopathy)  "(CMS/HCC) 3/4/2020   • Nonischemic cardiomyopathy (CMS/[facility])    • Palpitations    • Rapid heart rate    • Sleep apnea        Past Surgical History:   Procedure Laterality Date   • KNEE SURGERY Left 2019   • SHOULDER ROTATOR CUFF REPAIR Right 01/2020       Social History     Socioeconomic History   • Marital status:      Spouse name: Not on file   • Number of children: Not on file   • Years of education: Not on file   • Highest education level: Not on file   Tobacco Use   • Smoking status: Light Tobacco Smoker     Packs/day: 0.10     Years: 3.00     Pack years: 0.30     Types: Cigarettes     Start date: 2/7/2017   • Smokeless tobacco: Current User     Types: Snuff   Substance and Sexual Activity   • Alcohol use: Yes     Alcohol/week: 6.0 standard drinks     Types: 6 Cans of beer per week     Frequency: 2-4 times a month     Drinks per session: 10 or more     Binge frequency: Monthly     Comment: weekend alcohol use   • Drug use: Not Currently     Types: Marijuana   • Sexual activity: Not Currently     Partners: Female       Family History   Problem Relation Age of Onset   • Hypertension Mother    • Hypertension Sister            ECG 12 Lead  Date/Time: 3/4/2020 11:43 AM  Performed by: Gavin Luke MD  Authorized by: Gavin Luke MD   Comparison: compared with previous ECG from 2/15/2020  Similar to previous ECG  Rhythm: atrial fibrillation  Rate: normal  Conduction: non-specific intraventricular conduction delay  ST Segments: ST segments normal  T Waves: T waves normal  QRS axis: right  Other: no other findings  Other findings: non-specific ST-T wave changes    Clinical impression: abnormal EKG               Objective:     BP 98/64   Pulse 90   Ht 175.3 cm (69.02\")   Wt (!) 163 kg (358 lb 6.4 oz)   BMI 52.90 kg/m²  Body mass index is 52.9 kg/m².     Physical Exam   Constitutional: He is oriented to person, place, and time. He appears well-developed and well-nourished. No distress. "   Morbidly obese   HENT:   Head: Normocephalic and atraumatic.   Eyes: Pupils are equal, round, and reactive to light. EOM are normal.   Neck: Normal range of motion. Neck supple. No thyromegaly present.   Cardiovascular: Normal rate, normal heart sounds and intact distal pulses. An irregularly irregular rhythm present. Exam reveals no gallop and no friction rub.   No murmur heard.  Pulmonary/Chest: Effort normal and breath sounds normal. No respiratory distress. He has no wheezes. He has no rales. He exhibits no tenderness.   Abdominal: Soft. Bowel sounds are normal. He exhibits no distension. There is no guarding.   Musculoskeletal: Normal range of motion. He exhibits no edema or deformity.   Neurological: He is alert and oriented to person, place, and time. He has normal reflexes. No cranial nerve deficit.   Skin: Skin is warm and dry. No rash noted. He is not diaphoretic.   Psychiatric: He has a normal mood and affect. Judgment normal.       Review Of Data: I have reviewed prior medical records and labs.      Assessment/Plan:         Essential hypertension    Atrial fibrillation, persistent    NICM (nonischemic cardiomyopathy) (CMS/HCC)    Mixed hyperlipidemia    Morbidly obese (CMS/HCC)    Anticoagulated on Coumadin    Obstructive sleep apnea of adult    CKD (chronic kidney disease), stage II     Patient is clinically compensated and not in overt congestive heart failure.  No acute events or ER visits since last discharge.  He is a still in A. fib but controlled rate on Coreg 25 mg p.o. twice daily and digoxin 125 mcg/day.  He is compliant with his Coumadin and INR today per home monitoring is 2.3.  Check BMP and digoxin level today  Repeat echocardiogram on next follow-up visit.    Diagnosis and plan of care discussed with patient and verbalized understanding.           Gavin Luke MD  03/04/20  11:50 AM

## 2020-03-13 ENCOUNTER — READMISSION MANAGEMENT (OUTPATIENT)
Dept: CALL CENTER | Facility: HOSPITAL | Age: 55
End: 2020-03-13

## 2020-03-13 NOTE — OUTREACH NOTE
CHF Week 4 Survey      Responses   [facility] patient discharged from?  [place]   Does the patient have one of the following disease processes/diagnoses(primary or secondary)?  CHF   Week 4 attempt successful?  Yes   Call start time  0837   Call end time  0840   Discharge diagnosis  Atrial fibrillation with rapid ventricular response   Is patient permission given to speak with other caregiver?  Yes   List who call center can speak with  wife   Person spoke with today (if not patient) and relationship  wife   Meds reviewed with patient/caregiver?  Yes   Is the patient taking all medications as directed (includes completed medication regime)?  Yes   Has the patient kept scheduled appointments due by today?  Yes   Is the patient still receiving Home Health Services?  N/A   Psychosocial issues?  No   What is the patient's perception of their health status since discharge?  Returned to baseline/stable   Is the patient weighing daily?  -- [wife is not sure if he is weighing daily but I have encouraged this]   Is the patient able to teach back Heart Failure Zones?  Yes   Is the patient/caregiver able to teach back the hierarchy of who to call/visit for symptoms/problems? PCP, Specialist, Home health nurse, Urgent Care, ED, 911  Yes   Additional teach back comments  Wife states he is doing well and having no issues. Has been back to the MD   Week 4 Call Completed?  Yes   Would the patient like one additional call?  No   Graduated  Yes   Did the patient feel the follow up calls were helpful during their recovery period?  Yes   Was the number of calls appropriate?  Yes          Brenda Yeung RN

## 2020-03-18 ENCOUNTER — ANTICOAGULATION VISIT (OUTPATIENT)
Dept: PHARMACY | Facility: HOSPITAL | Age: 55
End: 2020-03-18

## 2020-03-18 DIAGNOSIS — I48.91 ATRIAL FIBRILLATION WITH RAPID VENTRICULAR RESPONSE (HCC): ICD-10-CM

## 2020-03-18 DIAGNOSIS — Z79.01 ANTICOAGULATED ON COUMADIN: ICD-10-CM

## 2020-03-18 LAB
INR PPP: 2 (ref 0.91–1.09)
PROTHROMBIN TIME: 24.1 SECONDS (ref 10–13.8)

## 2020-03-18 PROCEDURE — 36416 COLLJ CAPILLARY BLOOD SPEC: CPT

## 2020-03-18 PROCEDURE — 85610 PROTHROMBIN TIME: CPT

## 2020-03-18 NOTE — PROGRESS NOTES
Anticoagulation Clinic Progress Note    Anticoagulation Summary  As of 3/18/2020    INR goal:   2.0-3.0   TTR:   100.0 % (3 wk)   INR used for dosin.0 (3/18/2020)   Warfarin maintenance plan:   6 mg every day   Weekly warfarin total:   42 mg   No change documented:   Jimi Reid RPH   Plan last modified:   Mary Alarcon RPH (2020)   Next INR check:   4/15/2020   Priority:   Maintenance   Target end date:   Indefinite    Indications    Atrial fibrillation with rapid ventricular response (CMS/HCC) [I48.91]  Anticoagulated on Coumadin [Z79.01]             Anticoagulation Episode Summary     INR check location:       Preferred lab:       [facility]    Comments:   New to warfarin 2/10; See  tele note for warfarin dosing hx. Afib + left atrial appendage thrombus at present; pending cardioversion upon resolution of CARRI thrombus.      Anticoagulation Care Providers     Provider Role Specialty Phone number    Gavin Luke MD Referring Cardiology [phone]          Clinic Interview:  Patient Findings     Positives:   Change in medications    Negatives:   Signs/symptoms of thrombosis, Signs/symptoms of bleeding,   Laboratory test error suspected, Change in health, Change in alcohol use,   Change in activity, Upcoming invasive procedure, Emergency department   visit, Upcoming dental procedure, Missed doses, Extra doses, Change in   diet/appetite, Hospital admission, Bruising, Other complaints    Comments:   Started wt loss OTC yesterday that includes vit k 10 mcg;   however, today he reports he prefers to stop to avoid it affecting his   INR/warfarin.      Clinical Outcomes     Negatives:   Major bleeding event, Thromboembolic event,   Anticoagulation-related hospital admission, Anticoagulation-related ED   visit, Anticoagulation-related fatality    Comments:   Started wt loss OTC yesterday that includes vit k 10 mcg;   however, today he reports he prefers  to stop to avoid it affecting his   INR/warfarin.        INR History:  Anticoagulation Monitoring 2/24/2020 3/4/2020 3/18/2020   INR 2.6 2.3 2.0   INR Date 2/24/2020 3/4/2020 3/18/2020   INR Goal 2.0-3.0 2.0-3.0 2.0-3.0   Trend Same Same Same   Last Week Total 42 mg 42 mg 42 mg   Next Week Total 42 mg 42 mg 42 mg   Sun 6 mg 6 mg 6 mg   Mon 6 mg 6 mg 6 mg   Tue 6 mg 6 mg 6 mg   Wed 6 mg 6 mg 6 mg   Thu 6 mg 6 mg 6 mg   Fri 6 mg 6 mg 6 mg   Sat 6 mg 6 mg 6 mg   Visit Report - - -   Some recent data might be hidden       Plan:  1. INR is Therapeutic today- see above in Anticoagulation Summary.  Will instruct Milton Moody to Continue their warfarin regimen- see above in Anticoagulation Summary.  2. Follow up in 4 weeks  3. Patient declines warfarin refills.  4. Verbal and written information provided. Patient expresses understanding and has no further questions at this time.    Jimi Reid [facility]

## 2020-05-13 ENCOUNTER — ANTICOAGULATION VISIT (OUTPATIENT)
Dept: PHARMACY | Facility: HOSPITAL | Age: 55
End: 2020-05-13

## 2020-05-13 DIAGNOSIS — Z79.01 ANTICOAGULATED ON COUMADIN: ICD-10-CM

## 2020-05-13 DIAGNOSIS — I48.91 ATRIAL FIBRILLATION WITH RAPID VENTRICULAR RESPONSE (HCC): ICD-10-CM

## 2020-05-13 LAB
INR PPP: 3.6 (ref 0.91–1.09)
PROTHROMBIN TIME: 43.7 SECONDS (ref 10–13.8)

## 2020-05-13 PROCEDURE — 85610 PROTHROMBIN TIME: CPT

## 2020-05-13 PROCEDURE — 36416 COLLJ CAPILLARY BLOOD SPEC: CPT

## 2020-05-13 PROCEDURE — G0463 HOSPITAL OUTPT CLINIC VISIT: HCPCS

## 2020-05-13 NOTE — PROGRESS NOTES
Anticoagulation Clinic Progress Note    Anticoagulation Summary  As of 5/13/2020    INR goal:   2.0-3.0   TTR:   72.9 % (2.6 mo)   INR used for dosing:   3.6! (5/13/2020)   Warfarin maintenance plan:   6 mg every day   Weekly warfarin total:   42 mg   Plan last modified:   Mary Alarcon RPH (2/19/2020)   Next INR check:   6/3/2020   Priority:   Maintenance   Target end date:   Indefinite    Indications    Atrial fibrillation with rapid ventricular response (CMS/HCC) [I48.91]  Anticoagulated on Coumadin [Z79.01]             Anticoagulation Episode Summary     INR check location:       Preferred lab:       Send INR reminders to:    [facility] DI[facility]    Comments:   New to warfarin 2/10; See 2/18 tele note for warfarin dosing hx. Afib + left atrial appendage thrombus at present; pending cardioversion upon resolution of CARRI thrombus.      Anticoagulation Care Providers     Provider Role Specialty Phone number    Gavin Luke MD Referring Cardiology [phone]          Clinic Interview:  Patient Findings     Positives:   Change in health, Change in alcohol use, Change in   medications    Negatives:   Signs/symptoms of thrombosis, Signs/symptoms of bleeding,   Laboratory test error suspected, Change in activity, Upcoming invasive   procedure, Emergency department visit, Upcoming dental procedure, Missed   doses, Extra doses, Change in diet/appetite, Hospital admission, Bruising,   Other complaints    Comments:   Shoulder injury; increased pain, using Norco past couple   weeks. Reports 2 beers last night (out of the usual).      Clinical Outcomes     Negatives:   Major bleeding event, Thromboembolic event,   Anticoagulation-related hospital admission, Anticoagulation-related ED   visit, Anticoagulation-related fatality    Comments:   Shoulder injury; increased pain, using Norco past couple   weeks. Reports 2 beers last night (out of the usual).        INR History:  Anticoagulation Monitoring 3/4/2020  3/18/2020 5/13/2020   INR 2.3 2.0 3.6   INR Date 3/4/2020 3/18/2020 5/13/2020   INR Goal 2.0-3.0 2.0-3.0 2.0-3.0   Trend Same Same Same   Last Week Total 42 mg 42 mg 42 mg   Next Week Total 42 mg 42 mg 39 mg   Sun 6 mg 6 mg 6 mg   Mon 6 mg 6 mg 6 mg   Tue 6 mg 6 mg 6 mg   Wed 6 mg 6 mg 3 mg (5/13); Otherwise 6 mg   Thu 6 mg 6 mg 6 mg   Fri 6 mg 6 mg 6 mg   Sat 6 mg 6 mg 6 mg   Visit Report - - -   Some recent data might be hidden       Plan:  1. INR is Supratherapeutic today- see above in Anticoagulation Summary.  Will instruct Milton Moody to Change their warfarin regimen- see above in Anticoagulation Summary.  2. Follow up in 3 weeks rather than sooner due to pandemic.  3. Patient declines warfarin refills.  4. Verbal and written information provided. Patient expresses understanding and has no further questions at this time.    Jimi Reid [facility]

## 2020-05-28 RX ORDER — NITROGLYCERIN 0.4 MG/1
TABLET SUBLINGUAL
Qty: 25 TABLET | Refills: 1 | Status: SHIPPED | OUTPATIENT
Start: 2020-05-28 | End: 2020-07-17

## 2020-06-03 ENCOUNTER — ANTICOAGULATION VISIT (OUTPATIENT)
Dept: PHARMACY | Facility: HOSPITAL | Age: 55
End: 2020-06-03

## 2020-06-03 DIAGNOSIS — I48.91 ATRIAL FIBRILLATION WITH RAPID VENTRICULAR RESPONSE (HCC): ICD-10-CM

## 2020-06-03 DIAGNOSIS — Z79.01 ANTICOAGULATED ON COUMADIN: ICD-10-CM

## 2020-06-03 LAB
INR PPP: 1.7 (ref 0.91–1.09)
PROTHROMBIN TIME: 20.8 SECONDS (ref 10–13.8)

## 2020-06-03 PROCEDURE — G0463 HOSPITAL OUTPT CLINIC VISIT: HCPCS

## 2020-06-03 PROCEDURE — 36416 COLLJ CAPILLARY BLOOD SPEC: CPT

## 2020-06-03 PROCEDURE — 85610 PROTHROMBIN TIME: CPT

## 2020-06-03 NOTE — PROGRESS NOTES
Anticoagulation Clinic Progress Note    Anticoagulation Summary  As of 6/3/2020    INR goal:   2.0-3.0   TTR:   68.6 % (3.3 mo)   INR used for dosin.7! (6/3/2020)   Warfarin maintenance plan:   6 mg every day   Weekly warfarin total:   42 mg   No change documented:   Jimi Reid RPH   Plan last modified:   Mary Alarcon RPH (2020)   Next INR check:   6/10/2020   Priority:   Maintenance   Target end date:   Indefinite    Indications    Atrial fibrillation with rapid ventricular response (CMS/HCC) [I48.91]  Anticoagulated on Coumadin [Z79.01]             Anticoagulation Episode Summary     INR check location:       Preferred lab:       Send INR reminders to:    [facility] DIAM[facility]    Comments:   New to warfarin 2/10; See  tele note for warfarin dosing hx. Afib + left atrial appendage thrombus at present; pending cardioversion upon resolution of CARRI thrombus.      Anticoagulation Care Providers     Provider Role Specialty Phone number    Gavin Luke MD Referring Cardiology [phone]          Clinic Interview:  Patient Findings     Positives:   Change in diet/appetite, Other complaints    Negatives:   Signs/symptoms of thrombosis, Signs/symptoms of bleeding,   Laboratory test error suspected, Change in health, Change in alcohol use,   Change in activity, Upcoming invasive procedure, Emergency department   visit, Upcoming dental procedure, Missed doses, Extra doses, Change in   medications, Hospital admission, Bruising    Comments:   Has been taking 3 mg every Wed. Increased vit k.      Clinical Outcomes     Negatives:   Major bleeding event, Thromboembolic event,   Anticoagulation-related hospital admission, Anticoagulation-related ED   visit, Anticoagulation-related fatality    Comments:   Has been taking 3 mg every Wed. Increased vit k.        INR History:  Anticoagulation Monitoring 3/18/2020 2020 6/3/2020   INR 2.0 3.6 1.7   INR Date 3/18/2020 2020 6/3/2020   INR Goal  2.0-3.0 2.0-3.0 2.0-3.0   Trend Same Same Same   Last Week Total 42 mg 42 mg 39 mg   Next Week Total 42 mg 39 mg 42 mg   Sun 6 mg 6 mg 6 mg   Mon 6 mg 6 mg 6 mg   Tue 6 mg 6 mg 6 mg   Wed 6 mg 3 mg (5/13); Otherwise 6 mg 6 mg   Thu 6 mg 6 mg 6 mg   Fri 6 mg 6 mg 6 mg   Sat 6 mg 6 mg 6 mg   Visit Report - - -   Some recent data might be hidden       Plan:  1. INR is Subtherapeutic today- see above in Anticoagulation Summary.  Will instruct Milton Moody to Increase their warfarin regimen- see above in Anticoagulation Summary.  2. Follow up in 1 week alongside Cardiology appt  3. Patient declines warfarin refills.  4. Verbal and written information provided. Patient expresses understanding and has no further questions at this time.    Jimi Reid [facility]

## 2020-06-10 ENCOUNTER — ANTICOAGULATION VISIT (OUTPATIENT)
Dept: PHARMACY | Facility: HOSPITAL | Age: 55
End: 2020-06-10

## 2020-06-10 ENCOUNTER — OFFICE VISIT (OUTPATIENT)
Dept: CARDIOLOGY | Facility: CLINIC | Age: 55
End: 2020-06-10

## 2020-06-10 VITALS
WEIGHT: 315 LBS | HEART RATE: 84 BPM | DIASTOLIC BLOOD PRESSURE: 78 MMHG | BODY MASS INDEX: 46.65 KG/M2 | HEIGHT: 69 IN | SYSTOLIC BLOOD PRESSURE: 108 MMHG

## 2020-06-10 DIAGNOSIS — I48.19 ATRIAL FIBRILLATION, PERSISTENT (HCC): ICD-10-CM

## 2020-06-10 DIAGNOSIS — Z79.01 ANTICOAGULATED ON COUMADIN: ICD-10-CM

## 2020-06-10 DIAGNOSIS — I42.8 NICM (NONISCHEMIC CARDIOMYOPATHY) (HCC): ICD-10-CM

## 2020-06-10 DIAGNOSIS — I10 ESSENTIAL HYPERTENSION: ICD-10-CM

## 2020-06-10 DIAGNOSIS — G47.33 OBSTRUCTIVE SLEEP APNEA OF ADULT: ICD-10-CM

## 2020-06-10 DIAGNOSIS — I50.23 ACUTE ON CHRONIC SYSTOLIC CHF (CONGESTIVE HEART FAILURE) (HCC): Primary | ICD-10-CM

## 2020-06-10 DIAGNOSIS — E66.01 MORBIDLY OBESE (HCC): ICD-10-CM

## 2020-06-10 DIAGNOSIS — E78.2 MIXED HYPERLIPIDEMIA: ICD-10-CM

## 2020-06-10 DIAGNOSIS — I48.91 ATRIAL FIBRILLATION WITH RAPID VENTRICULAR RESPONSE (HCC): ICD-10-CM

## 2020-06-10 LAB
INR PPP: 3.3 (ref 0.91–1.09)
PROTHROMBIN TIME: 39.7 SECONDS (ref 10–13.8)

## 2020-06-10 PROCEDURE — 36416 COLLJ CAPILLARY BLOOD SPEC: CPT

## 2020-06-10 PROCEDURE — G0463 HOSPITAL OUTPT CLINIC VISIT: HCPCS

## 2020-06-10 PROCEDURE — 93000 ELECTROCARDIOGRAM COMPLETE: CPT | Performed by: INTERNAL MEDICINE

## 2020-06-10 PROCEDURE — 99214 OFFICE O/P EST MOD 30 MIN: CPT | Performed by: INTERNAL MEDICINE

## 2020-06-10 PROCEDURE — 85610 PROTHROMBIN TIME: CPT

## 2020-06-10 NOTE — PROGRESS NOTES
PATIENTINFORMATION    Date of Office Visit: 06/10/2020  Encounter Provider: Gavin Luke MD  Place of Service: [facility] CARDIOLOGY  Patient Name: Milton Moody  : 1965    Subjective:     Encounter Date:06/10/2020      Patient ID: Milton Moody is a 54 y.o. male.    Chief Complaint   Patient presents with   • Atrial Fibrillation     3 months follow up     HPI  Mr. Moody f nonischemic cardiomyopathy probably due to tachycardia induced cardiomyopathy, persistent atrial fibrillation, hypertension uncontrolled, morbid obesity and obstructive sleep apnea and medication noncompliance came to cardiology clinic for follow-up visit.  Since last visit patient reports continued improvement of shortness of breath and today he denied any orthopnea, PND, palpitations, presyncope or syncope or bleeding from any site.  He is very compliant with his medical regimen and checks his blood pressure at home that runs within normal range and his heart rate also runs in the 60s and 70s.  Also wears CPAP machine at night.  He is trying to increase his level of activity and walks for 15-20 minutes twice a day but admits not being very compliant with his diet and initially he lost about 8 pounds that he gained back and today his weight is about the same as last visit.  Otherwise no significant complaints today.      ROS   All systems reviewed.  Reports some abdominal bloating and leg cramps otherwise negative review of systems.    Past Medical History:   Diagnosis Date   • Abnormal ECG    • Asthma    • Atrial fibrillation (CMS/HCC)    • Chest pain    • CKD (chronic kidney disease) stage 3, GFR 30-59 ml/min (CMS/HCC) 3/4/2020   • Hyperlipidemia    • Hypertension    • NICM (nonischemic cardiomyopathy) (CMS/HCC) 3/4/2020   • Nonischemic cardiomyopathy (CMS/HCC)    • Palpitations    • Rapid heart rate    • Sleep apnea        Past Surgical History:   Procedure Laterality Date   • KNEE SURGERY Left    •  SHOULDER ROTATOR CUFF REPAIR Right 01/2020       Social History     Socioeconomic History   • Marital status:      Spouse name: Not on file   • Number of children: Not on file   • Years of education: Not on file   • Highest education level: Not on file   Tobacco Use   • Smoking status: Former Smoker     Packs/day: 0.10     Years: 3.00     Pack years: 0.30     Types: Cigarettes   • Smokeless tobacco: Former User     Types: Snuff   Substance and Sexual Activity   • Alcohol use: Yes     Alcohol/week: 6.0 standard drinks     Types: 6 Cans of beer per week     Frequency: 2-4 times a month     Drinks per session: 10 or more     Binge frequency: Monthly     Comment: weekend alcohol use   • Drug use: Not Currently     Types: Marijuana   • Sexual activity: Not Currently     Partners: Female       Family History   Problem Relation Age of Onset   • Hypertension Mother    • Hypertension Sister            ECG 12 Lead  Date/Time: 6/10/2020 10:55 AM  Performed by: Gavin Luke MD  Authorized by: Gavin Luke MD   Comparison: compared with previous ECG from 3/4/2020  Similar to previous ECG  Rhythm: atrial fibrillation  Rate: normal  Conduction: non-specific intraventricular conduction delay  ST Segments: ST segments normal  T Waves: T waves normal  QRS axis: normal  Other findings: non-specific ST-T wave changes    Clinical impression: abnormal EKG               Objective:     There were no vitals taken for this visit. There is no height or weight on file to calculate BMI.     Physical Exam   Constitutional: He is oriented to person, place, and time. He appears well-developed and well-nourished. No distress.   HENT:   Head: Normocephalic and atraumatic.   Eyes: Pupils are equal, round, and reactive to light. EOM are normal.   Neck: Normal range of motion. Neck supple. No thyromegaly present.   Cardiovascular: Normal rate, normal heart sounds and intact distal pulses. An irregularly irregular rhythm  present. Exam reveals no gallop and no friction rub.   No murmur heard.  Pulmonary/Chest: Effort normal and breath sounds normal. No respiratory distress. He has no wheezes. He has no rales. He exhibits no tenderness.   Abdominal: Soft. Bowel sounds are normal. He exhibits no distension. There is no guarding.   Musculoskeletal: Normal range of motion. He exhibits no edema or deformity.   Neurological: He is alert and oriented to person, place, and time. He has normal reflexes. No cranial nerve deficit.   Skin: Skin is warm and dry. No rash noted. He is not diaphoretic.   Psychiatric: He has a normal mood and affect. Judgment normal.       Review Of Data: I have reviewed labs from last visit      Assessment/Plan:           NICM (nonischemic cardiomyopathy) (CMS/HCC)    Essential hypertension    Atrial fibrillation, persistent    Mixed hyperlipidemia    Morbidly obese (CMS/HCC)    Anticoagulated on Coumadin    Obstructive sleep apnea of adult    CKD (chronic kidney disease), stage II     Patient is hemodynamically stable and not in overt heart failure.  Cardiomyopathy seems to be compensated.  Very good blood pressure and heart rate control on current regimen.  Only obstacle today's is weight and I am going to refer him to dietitian.  I will repeat BMP and echocardiogram to evaluate for EF.  I have also encouraged patient to increase level of activity and walk at least for 30-45 minutes a day.      Diagnosis and plan of care discussed with patient and verbalized understanding.           Gavin Luke MD  06/10/20  10:53

## 2020-06-10 NOTE — PROGRESS NOTES
Anticoagulation Clinic Progress Note    Anticoagulation Summary  As of 6/10/2020    INR goal:   2.0-3.0   TTR:   68.2 % (3.5 mo)   INR used for dosing:   3.3! (6/10/2020)   Warfarin maintenance plan:   6 mg every day   Weekly warfarin total:   42 mg   Plan last modified:   Mary Alarcon RPH (2/19/2020)   Next INR check:   6/24/2020   Priority:   Maintenance   Target end date:   Indefinite    Indications    Atrial fibrillation with rapid ventricular response (CMS/HCC) [I48.91]  Anticoagulated on Coumadin [Z79.01]             Anticoagulation Episode Summary     INR check location:       Preferred lab:       [facility]ers [facility]:   [facility] [place]    Comments:   New to warfarin 2/10; See 2/18 tele note for warfarin dosing hx. Afib + left atrial appendage thrombus at present; pending cardioversion upon resolution of CARRI thrombus.      Anticoagulation Care Providers     Provider Role Specialty Phone number    Gavin Luke MD Referring Cardiology [phone]          Clinic Interview:      INR History:  Anticoagulation Monitoring 5/13/2020 6/3/2020 6/10/2020   INR 3.6 1.7 3.3   INR Date 5/13/2020 6/3/2020 6/10/2020   INR Goal 2.0-3.0 2.0-3.0 2.0-3.0   Trend Same Same Same   Last Week Total 42 mg 39 mg 42 mg   Next Week Total 39 mg 42 mg 42 mg   Sun 6 mg 6 mg 6 mg   Mon 6 mg 6 mg 6 mg   Tue 6 mg 6 mg 6 mg   Wed 3 mg (5/13); Otherwise 6 mg 6 mg 6 mg   Thu 6 mg 6 mg 6 mg   Fri 6 mg 6 mg 6 mg   Sat 6 mg 6 mg 6 mg   Visit Report - - -   Some recent data might be hidden       Plan:  1. INR is Supratherapeutic today- see above in Anticoagulation Summary.  Will instruct Milton Moody to Continue their warfarin regimen- see above in Anticoagulation Summary.  2. Follow up in 2 weeks  3. Patient declines warfarin refills.  4. Verbal and written information provided. Patient expresses understanding and has no further questions at this time.    Mary Alarcon RPH

## 2020-06-23 ENCOUNTER — TELEPHONE (OUTPATIENT)
Dept: CARDIOLOGY | Facility: CLINIC | Age: 55
End: 2020-06-23

## 2020-06-23 ENCOUNTER — LAB (OUTPATIENT)
Dept: LAB | Facility: HOSPITAL | Age: 55
End: 2020-06-23

## 2020-06-23 DIAGNOSIS — I10 ESSENTIAL HYPERTENSION: ICD-10-CM

## 2020-06-23 DIAGNOSIS — I50.23 ACUTE ON CHRONIC SYSTOLIC CHF (CONGESTIVE HEART FAILURE) (HCC): ICD-10-CM

## 2020-06-23 LAB
ANION GAP SERPL CALCULATED.3IONS-SCNC: 10.5 MMOL/L (ref 5–15)
BUN BLD-MCNC: 13 MG/DL (ref 6–20)
BUN/CREAT SERPL: 10.9 (ref 7–25)
CALCIUM SPEC-SCNC: 9.1 MG/DL (ref 8.6–10.5)
CHLORIDE SERPL-SCNC: 106 MMOL/L (ref 98–107)
CO2 SERPL-SCNC: 22.5 MMOL/L (ref 22–29)
CREAT BLD-MCNC: 1.19 MG/DL (ref 0.76–1.27)
DIGOXIN SERPL-MCNC: 0.4 NG/ML (ref 0.6–1.2)
GFR SERPL CREATININE-BSD FRML MDRD: 78 ML/MIN/1.73
GLUCOSE BLD-MCNC: 100 MG/DL (ref 65–99)
POTASSIUM BLD-SCNC: 3.8 MMOL/L (ref 3.5–5.2)
SODIUM BLD-SCNC: 139 MMOL/L (ref 136–145)

## 2020-06-23 PROCEDURE — 80162 ASSAY OF DIGOXIN TOTAL: CPT

## 2020-06-23 PROCEDURE — 80048 BASIC METABOLIC PNL TOTAL CA: CPT

## 2020-06-23 PROCEDURE — 36415 COLL VENOUS BLD VENIPUNCTURE: CPT

## 2020-06-25 ENCOUNTER — ANTICOAGULATION VISIT (OUTPATIENT)
Dept: PHARMACY | Facility: HOSPITAL | Age: 55
End: 2020-06-25

## 2020-06-25 DIAGNOSIS — I48.91 ATRIAL FIBRILLATION WITH RAPID VENTRICULAR RESPONSE (HCC): ICD-10-CM

## 2020-06-25 DIAGNOSIS — Z79.01 ANTICOAGULATED ON COUMADIN: ICD-10-CM

## 2020-06-25 LAB
INR PPP: 3.3 (ref 0.91–1.09)
PROTHROMBIN TIME: 40 SECONDS (ref 10–13.8)

## 2020-06-25 PROCEDURE — 36416 COLLJ CAPILLARY BLOOD SPEC: CPT

## 2020-06-25 PROCEDURE — 85610 PROTHROMBIN TIME: CPT

## 2020-06-25 NOTE — PROGRESS NOTES
Anticoagulation Clinic Progress Note    Anticoagulation Summary  As of 6/25/2020    INR goal:   2.0-3.0   TTR:   59.7 % (4 mo)   INR used for dosing:   3.3! (6/25/2020)   Warfarin maintenance plan:   3 mg every Thu; 6 mg all other days   Weekly warfarin total:   39 mg   Plan last modified:   Mary Alarcon RPH (6/25/2020)   Next INR check:   7/13/2020   Priority:   Maintenance   Target end date:   Indefinite    Indications    Atrial fibrillation with rapid ventricular response (CMS/HCC) [I48.91]  Anticoagulated on Coumadin [Z79.01]             Anticoagulation Episode Summary     INR check location:       Preferred lab:       Send INR reminders to:   [facility] [place]    Comments:   New to warfarin 2/10; See 2/18 tele note for warfarin dosing hx. Afib + left atrial appendage thrombus at present; pending cardioversion upon resolution of CARRI thrombus.      Anticoagulation Care Providers     Provider Role Specialty Phone number    Gavin Luke MD Referring Cardiology [phone]          Clinic Interview:      INR History:  Anticoagulation Monitoring 6/3/2020 6/10/2020 6/25/2020   INR 1.7 3.3 3.3   INR Date 6/3/2020 6/10/2020 6/25/2020   INR Goal 2.0-3.0 2.0-3.0 2.0-3.0   Trend Same Same Down   Last Week Total 39 mg 42 mg 42 mg   Next Week Total 42 mg 42 mg 39 mg   Sun 6 mg 6 mg 6 mg   Mon 6 mg 6 mg 6 mg   Tue 6 mg 6 mg 6 mg   Wed 6 mg 6 mg 6 mg   Thu 6 mg 6 mg 3 mg   Fri 6 mg 6 mg 6 mg   Sat 6 mg 6 mg 6 mg   Visit Report - - -   Some recent data might be hidden       Plan:  1. INR is Supratherapeutic today- see above in Anticoagulation Summary.  Will instruct Milton Moody to Change their warfarin regimen- see above in Anticoagulation Summary.  2. Follow up in 2.5 weeks  3. Patient declines warfarin refills.  4. Verbal and written information provided. Patient expresses understanding and has no further questions at this time.    Mary Alarcon RPH

## 2020-06-29 ENCOUNTER — HOSPITAL ENCOUNTER (OUTPATIENT)
Dept: CARDIOLOGY | Facility: HOSPITAL | Age: 55
Discharge: HOME | End: 2020-06-29
Admitting: INTERNAL MEDICINE

## 2020-06-29 VITALS
SYSTOLIC BLOOD PRESSURE: 138 MMHG | BODY MASS INDEX: 46.65 KG/M2 | HEIGHT: 69 IN | WEIGHT: 315 LBS | DIASTOLIC BLOOD PRESSURE: 90 MMHG | HEART RATE: 58 BPM | OXYGEN SATURATION: 98 %

## 2020-06-29 DIAGNOSIS — I48.19 ATRIAL FIBRILLATION, PERSISTENT (HCC): ICD-10-CM

## 2020-06-29 LAB
ASCENDING AORTA: 3.4 CM
BH CV ECHO MEAS - ACS: 1.7 CM
BH CV ECHO MEAS - AO MAX PG (FULL): 8 MMHG
BH CV ECHO MEAS - AO MAX PG: 9.7 MMHG
BH CV ECHO MEAS - AO MEAN PG (FULL): 5.5 MMHG
BH CV ECHO MEAS - AO MEAN PG: 6.5 MMHG
BH CV ECHO MEAS - AO ROOT AREA (BSA CORRECTED): 1.3
BH CV ECHO MEAS - AO ROOT AREA: 9.7 CM^2
BH CV ECHO MEAS - AO ROOT DIAM: 3.5 CM
BH CV ECHO MEAS - AO V2 MAX: 156.1 CM/SEC
BH CV ECHO MEAS - AO V2 MEAN: 124.2 CM/SEC
BH CV ECHO MEAS - AO V2 VTI: 30.8 CM
BH CV ECHO MEAS - ASC AORTA: 3.4 CM
BH CV ECHO MEAS - AVA(I,A): 1.1 CM^2
BH CV ECHO MEAS - AVA(I,D): 1.1 CM^2
BH CV ECHO MEAS - AVA(V,A): 1.4 CM^2
BH CV ECHO MEAS - AVA(V,D): 1.4 CM^2
BH CV ECHO MEAS - BSA(HAYCOCK): 2.9 M^2
BH CV ECHO MEAS - BSA: 2.7 M^2
BH CV ECHO MEAS - BZI_BMI: 53.2 KILOGRAMS/M^2
BH CV ECHO MEAS - BZI_METRIC_HEIGHT: 175.3 CM
BH CV ECHO MEAS - BZI_METRIC_WEIGHT: 163.3 KG
BH CV ECHO MEAS - EDV(TEICH): 102.7 ML
BH CV ECHO MEAS - EF(CUBED): 42.6 %
BH CV ECHO MEAS - EF(MOD-BP): 36 %
BH CV ECHO MEAS - EF(TEICH): 35.3 %
BH CV ECHO MEAS - ESV(TEICH): 66.4 ML
BH CV ECHO MEAS - FS: 16.9 %
BH CV ECHO MEAS - IVS/LVPW: 1
BH CV ECHO MEAS - IVSD: 1.2 CM
BH CV ECHO MEAS - LAT PEAK E' VEL: 17.8 CM/SEC
BH CV ECHO MEAS - LV MASS(C)D: 201.4 GRAMS
BH CV ECHO MEAS - LV MASS(C)DI: 75.9 GRAMS/M^2
BH CV ECHO MEAS - LV MAX PG: 1.8 MMHG
BH CV ECHO MEAS - LV MEAN PG: 1 MMHG
BH CV ECHO MEAS - LV V1 MAX: 66.8 CM/SEC
BH CV ECHO MEAS - LV V1 MEAN: 47.7 CM/SEC
BH CV ECHO MEAS - LV V1 VTI: 10.1 CM
BH CV ECHO MEAS - LVIDD: 4.7 CM
BH CV ECHO MEAS - LVIDS: 3.9 CM
BH CV ECHO MEAS - LVOT AREA (M): 3.1 CM^2
BH CV ECHO MEAS - LVOT AREA: 3.2 CM^2
BH CV ECHO MEAS - LVOT DIAM: 2 CM
BH CV ECHO MEAS - LVPWD: 1.1 CM
BH CV ECHO MEAS - MED PEAK E' VEL: 7 CM/SEC
BH CV ECHO MEAS - MV DEC SLOPE: 303.6 CM/SEC^2
BH CV ECHO MEAS - MV DEC TIME: 0.27 SEC
BH CV ECHO MEAS - MV E MAX VEL: 79.7 CM/SEC
BH CV ECHO MEAS - MV MAX PG: 3.6 MMHG
BH CV ECHO MEAS - MV MEAN PG: 1.7 MMHG
BH CV ECHO MEAS - MV P1/2T MAX VEL: 101.8 CM/SEC
BH CV ECHO MEAS - MV P1/2T: 98.2 MSEC
BH CV ECHO MEAS - MV V2 MAX: 95 CM/SEC
BH CV ECHO MEAS - MV V2 MEAN: 62.3 CM/SEC
BH CV ECHO MEAS - MV V2 VTI: 21.2 CM
BH CV ECHO MEAS - MVA P1/2T LCG: 2.2 CM^2
BH CV ECHO MEAS - MVA(P1/2T): 2.2 CM^2
BH CV ECHO MEAS - MVA(VTI): 1.5 CM^2
BH CV ECHO MEAS - PA ACC TIME: 0.06 SEC
BH CV ECHO MEAS - PA MAX PG (FULL): 2 MMHG
BH CV ECHO MEAS - PA MAX PG: 2.7 MMHG
BH CV ECHO MEAS - PA PR(ACCEL): 52.9 MMHG
BH CV ECHO MEAS - PA V2 MAX: 82.8 CM/SEC
BH CV ECHO MEAS - PVA(V,A): 1.4 CM^2
BH CV ECHO MEAS - PVA(V,D): 1.4 CM^2
BH CV ECHO MEAS - QP/QS: 0.54
BH CV ECHO MEAS - RAP SYSTOLE: 8 MMHG
BH CV ECHO MEAS - RV MAX PG: 0.72 MMHG
BH CV ECHO MEAS - RV MEAN PG: 0.37 MMHG
BH CV ECHO MEAS - RV V1 MAX: 42.4 CM/SEC
BH CV ECHO MEAS - RV V1 MEAN: 29.1 CM/SEC
BH CV ECHO MEAS - RV V1 VTI: 6.2 CM
BH CV ECHO MEAS - RVOT AREA: 2.8 CM^2
BH CV ECHO MEAS - RVOT DIAM: 1.9 CM
BH CV ECHO MEAS - SI(AO): 112.7 ML/M^2
BH CV ECHO MEAS - SI(CUBED): 16.7 ML/M^2
BH CV ECHO MEAS - SI(LVOT): 12.2 ML/M^2
BH CV ECHO MEAS - SI(TEICH): 13.7 ML/M^2
BH CV ECHO MEAS - SV(AO): 298.8 ML
BH CV ECHO MEAS - SV(CUBED): 44.4 ML
BH CV ECHO MEAS - SV(LVOT): 32.4 ML
BH CV ECHO MEAS - SV(RVOT): 17.4 ML
BH CV ECHO MEAS - SV(TEICH): 36.3 ML
BH CV ECHO MEAS - TAPSE (>1.6): 1.6 CM2
BH CV ECHO MEASUREMENTS AVERAGE E/E' RATIO: 6.43
LEFT ATRIUM VOLUME INDEX: 23 ML/M2
SINUS: 3.2 CM
STJ: 3 CM

## 2020-06-29 PROCEDURE — 93306 TTE W/DOPPLER COMPLETE: CPT

## 2020-06-29 PROCEDURE — 93306 TTE W/DOPPLER COMPLETE: CPT | Performed by: INTERNAL MEDICINE

## 2020-06-29 PROCEDURE — 25010000002 PERFLUTREN (DEFINITY) 8.476 MG IN SODIUM CHLORIDE 0.9 % 10 ML INJECTION: Performed by: INTERNAL MEDICINE

## 2020-06-29 RX ADMIN — PERFLUTREN 1.5 ML: 6.52 INJECTION, SUSPENSION INTRAVENOUS at 13:37

## 2020-07-17 RX ORDER — NITROGLYCERIN 0.4 MG/1
TABLET SUBLINGUAL
Qty: 25 TABLET | Refills: 1 | Status: SHIPPED | OUTPATIENT
Start: 2020-07-17 | End: 2020-10-05

## 2020-08-11 ENCOUNTER — ANTICOAGULATION VISIT (OUTPATIENT)
Dept: PHARMACY | Facility: HOSPITAL | Age: 55
End: 2020-08-11

## 2020-08-11 DIAGNOSIS — I48.91 ATRIAL FIBRILLATION WITH RAPID VENTRICULAR RESPONSE (HCC): ICD-10-CM

## 2020-08-11 DIAGNOSIS — Z79.01 ANTICOAGULATED ON COUMADIN: ICD-10-CM

## 2020-08-11 LAB
INR PPP: 2.6 (ref 0.91–1.09)
PROTHROMBIN TIME: 30.6 SECONDS (ref 10–13.8)

## 2020-08-11 PROCEDURE — 36416 COLLJ CAPILLARY BLOOD SPEC: CPT

## 2020-08-11 PROCEDURE — 85610 PROTHROMBIN TIME: CPT

## 2020-08-11 RX ORDER — WARFARIN SODIUM 6 MG/1
TABLET ORAL
Qty: 30 TABLET | Refills: 2 | Status: SHIPPED | OUTPATIENT
Start: 2020-08-11 | End: 2020-09-08 | Stop reason: SDUPTHER

## 2020-08-11 NOTE — PROGRESS NOTES
I have supervised and reviewed the notes, assessments, and/or procedures performed by our PharmD Candidate. The documented assessment and plan were developed cooperatively, and the plan was implemented in my presence. I concur with the documentation of this patient encounter.    Rani Dhillon, [facility]

## 2020-08-11 NOTE — PROGRESS NOTES
Anticoagulation Clinic Progress Note    Anticoagulation Summary  As of 2020    INR goal:   2.0-3.0   TTR:   58.9 % (5.6 mo)   INR used for dosin.6 (2020)   Warfarin maintenance plan:   6 mg every day   Weekly warfarin total:   42 mg   Plan last modified:   Kelly Christy, Pharmacy Intern (2020)   Next INR check:   2020   Priority:   Maintenance   Target end date:   Indefinite    Indications    Atrial fibrillation with rapid ventricular response (CMS/HCC) [I48.91]  Anticoagulated on Coumadin [Z79.01]             Anticoagulation Episode Summary     INR check location:       Preferred lab:       Send INR reminders to:    [facility] DI[facility] [place]    Comments:   New to warfarin 2/10; See  tele note for warfarin dosing hx. Afib + left atrial appendage thrombus at present; pending cardioversion upon resolution of CARRI thrombus.      Anticoagulation Care Providers     Provider Role Specialty Phone number    Gavin Luke MD Referring Cardiology [phone]          Clinic Interview:  Patient Findings     Positives:   Extra doses    Negatives:   Signs/symptoms of thrombosis, Signs/symptoms of bleeding,   Laboratory test error suspected, Change in health, Change in alcohol use,   Change in activity, Upcoming invasive procedure, Emergency department   visit, Upcoming dental procedure, Missed doses, Change in medications,   Change in diet/appetite, Hospital admission, Bruising, Other complaints      Clinical Outcomes     Negatives:   Major bleeding event, Thromboembolic event,   Anticoagulation-related hospital admission, Anticoagulation-related ED   visit, Anticoagulation-related fatality        INR History:  Anticoagulation Monitoring 6/10/2020 2020 2020   INR 3.3 3.3 2.6   INR Date 6/10/2020 2020 2020   INR Goal 2.0-3.0 2.0-3.0 2.0-3.0   Trend Same Down Up   Last Week Total 42 mg 42 mg 39 mg   Next Week Total 42 mg 39 mg 42 mg   Sun 6 mg 6 mg 6 mg   Mon 6 mg 6 mg  6 mg   Tue 6 mg 6 mg 6 mg   Wed 6 mg 6 mg 6 mg   Thu 6 mg 3 mg 6 mg   Fri 6 mg 6 mg 6 mg   Sat 6 mg 6 mg 6 mg   Visit Report - - -   Some recent data might be hidden       Plan:  1. INR is Therapeutic today- see above in Anticoagulation Summary.  Will instruct Milton Moody to Change their warfarin regimen- see above in Anticoagulation Summary.  2. Follow up in 4 weeks  3. Patient desires warfarin refills.  4. Verbal and written information provided. Patient expresses understanding and has no further questions at this time.    Kelly Christy, Pharmacy Intern

## 2020-08-26 RX ORDER — CARVEDILOL 25 MG/1
TABLET ORAL
Qty: 30 TABLET | Refills: 11 | Status: SHIPPED | OUTPATIENT
Start: 2020-08-26 | End: 2021-04-19

## 2020-09-08 ENCOUNTER — ANTICOAGULATION VISIT (OUTPATIENT)
Dept: PHARMACY | Facility: HOSPITAL | Age: 55
End: 2020-09-08

## 2020-09-08 DIAGNOSIS — Z79.01 ANTICOAGULATED ON COUMADIN: ICD-10-CM

## 2020-09-08 DIAGNOSIS — I48.91 ATRIAL FIBRILLATION WITH RAPID VENTRICULAR RESPONSE (HCC): ICD-10-CM

## 2020-09-08 LAB
INR PPP: 1.3 (ref 0.91–1.09)
PROTHROMBIN TIME: 15.9 SECONDS (ref 10–13.8)

## 2020-09-08 PROCEDURE — 85610 PROTHROMBIN TIME: CPT

## 2020-09-08 PROCEDURE — G0463 HOSPITAL OUTPT CLINIC VISIT: HCPCS

## 2020-09-08 PROCEDURE — 36416 COLLJ CAPILLARY BLOOD SPEC: CPT

## 2020-09-08 RX ORDER — FAMOTIDINE 40 MG/1
1 TABLET, FILM COATED ORAL DAILY
COMMUNITY
Start: 2020-09-01 | End: 2023-01-25 | Stop reason: ALTCHOICE

## 2020-09-08 RX ORDER — WARFARIN SODIUM 6 MG/1
TABLET ORAL
Qty: 30 TABLET | Refills: 2 | Status: SHIPPED | OUTPATIENT
Start: 2020-09-08 | End: 2020-11-24 | Stop reason: SDUPTHER

## 2020-09-08 NOTE — PROGRESS NOTES
I have supervised and reviewed the notes, assessments, and/or procedures performed by our Pharmacy Intern. The documented assessment and plan were developed cooperatively, and the plan was implemented in my presence. I concur with the documentation of this patient encounter.  Patient had decreased his dose to 3mg for 3 days (reg dose is 6mg daily) because of tingling in his legs. Pharmacy educated patient on the importance of not stopping or decreasing warfarin therapy. Explained that he can stop into our [facility] w/o appt if he feels he wants to check his INR level.    Lauren Rodriges, [facility]

## 2020-09-08 NOTE — PROGRESS NOTES
Anticoagulation Clinic Progress Note    Anticoagulation Summary  As of 2020    INR goal:   2.0-3.0   TTR:   57.1 % (6.5 mo)   INR used for dosin.3! (2020)   Warfarin maintenance plan:   6 mg every day   Weekly warfarin total:   42 mg   Plan last modified:   Kelly Christy RPH (2020)   Next INR check:   2020   Priority:   Maintenance   Target end date:   Indefinite    Indications    Atrial fibrillation with rapid ventricular response (CMS/HCC) [I48.91]  Anticoagulated on Coumadin [Z79.01]             Anticoagulation Episode Summary     INR check location:       Preferred lab:       Send INR reminders to:    [facility]AH DI[facility] [place]    Comments:   New to warfarin 2/10; See  tele note for warfarin dosing hx. Afib + left atrial appendage thrombus at present; pending cardioversion upon resolution of CARRI thrombus.      Anticoagulation Care Providers     Provider Role Specialty Phone number    Gavin Luke MD Referring Cardiology [phone]          Clinic Interview:  Patient Findings     Positives:   Missed doses, Change in diet/appetite, Other complaints    Negatives:   Signs/symptoms of thrombosis, Signs/symptoms of bleeding,   Laboratory test error suspected, Change in health, Change in alcohol use,   Change in activity, Upcoming invasive procedure, Emergency department   visit, Upcoming dental procedure, Extra doses, Change in medications,   Hospital admission, Bruising    Comments:   Leg pain and tingling in feet   More greens last week  Took 3mg on Sat, Sun, and Mon instead of 6mg      Clinical Outcomes     Negatives:   Major bleeding event, Thromboembolic event,   Anticoagulation-related hospital admission, Anticoagulation-related ED   visit, Anticoagulation-related fatality    Comments:   Leg pain and tingling in feet   More greens last week  Took 3mg on Sat, Sun, and Mon instead of 6mg        INR History:  Anticoagulation Monitoring 2020   INR  3.3 2.6 1.3   INR Date 6/25/2020 8/11/2020 9/8/2020   INR Goal 2.0-3.0 2.0-3.0 2.0-3.0   Trend Down Up Same   Last Week Total 42 mg 39 mg 33 mg   Next Week Total 39 mg 42 mg 48 mg   Sun 6 mg 6 mg 6 mg   Mon 6 mg 6 mg 6 mg   Tue 6 mg 6 mg 12 mg (9/8); Otherwise 6 mg   Wed 6 mg 6 mg 6 mg   Thu 3 mg 6 mg 6 mg   Fri 6 mg 6 mg 6 mg   Sat 6 mg 6 mg 6 mg   Visit Report - - -   Some recent data might be hidden       Plan:  1. INR is Subtherapeutic today- see above in Anticoagulation Summary.  Will instruct Milton Moody to take 12 mg today then continue with warfarin regimen.- see above in Anticoagulation Summary.  2. Follow up in 2 weeks  3. Patient desires warfarin refills.  4. Verbal and written information provided. Counseled and discussed giving clinic a call if any changes or if feels need to adjust warfarin regimen. Also counseled on signs and symptoms of stroke and the need to get immediate medical attention if occurs. Patient expresses understanding and has no further questions at this time.    Vira Alvarado, Pharmacy Intern

## 2020-09-29 ENCOUNTER — ANTICOAGULATION VISIT (OUTPATIENT)
Dept: PHARMACY | Facility: HOSPITAL | Age: 55
End: 2020-09-29

## 2020-09-29 DIAGNOSIS — Z79.01 ANTICOAGULATED ON COUMADIN: ICD-10-CM

## 2020-09-29 DIAGNOSIS — I48.91 ATRIAL FIBRILLATION WITH RAPID VENTRICULAR RESPONSE (HCC): ICD-10-CM

## 2020-09-29 LAB
INR PPP: 2.9 (ref 0.91–1.09)
PROTHROMBIN TIME: 35.4 SECONDS (ref 10–13.8)

## 2020-09-29 PROCEDURE — 36416 COLLJ CAPILLARY BLOOD SPEC: CPT

## 2020-09-29 PROCEDURE — 85610 PROTHROMBIN TIME: CPT

## 2020-09-29 NOTE — PROGRESS NOTES
Anticoagulation Clinic Progress Note    Anticoagulation Summary  As of 2020    INR goal:  2.0-3.0   TTR:  57.0 % (7.2 mo)   INR used for dosin.9 (2020)   Warfarin maintenance plan:  6 mg every day   Weekly warfarin total:  42 mg   No change documented:  Marissa Joshi   Plan last modified:  Kelly Christy RPH (2020)   Next INR check:  10/20/2020   Priority:  Maintenance   Target end date:  Indefinite    Indications    Atrial fibrillation with rapid ventricular response (CMS/HCC) [I48.91]  Anticoagulated on Coumadin [Z79.01]             Anticoagulation Episode Summary     INR check location:      Preferred lab:      Send INR reminders to:   JONAH DIAMOND [facility]    Comments:  New to warfarin 2/10; See  tele note for warfarin dosing hx. Afib + left atrial appendage thrombus at present; pending cardioversion upon resolution of CARRI thrombus.      Anticoagulation Care Providers     Provider Role Specialty Phone number    Gavin Luke MD Referring Cardiology [phone]          Clinic Interview:  Patient Findings     Negatives:  Signs/symptoms of thrombosis, Signs/symptoms of bleeding,   Laboratory test error suspected, Change in health, Change in alcohol use,   Change in activity, Upcoming invasive procedure, Emergency department   visit, Upcoming dental procedure, Missed doses, Extra doses, Change in   medications, Change in diet/appetite, Hospital admission, Bruising, Other   complaints      Clinical Outcomes     Negatives:  Major bleeding event, Thromboembolic event,   Anticoagulation-related hospital admission, Anticoagulation-related ED   visit, Anticoagulation-related fatality        INR History:  Anticoagulation Monitoring 2020   INR 2.6 1.3 2.9   INR Date 2020   INR Goal 2.0-3.0 2.0-3.0 2.0-3.0   Trend Up Same Same   Last Week Total 39 mg 33 mg 42 mg   Next Week Total 42 mg 48 mg 42 mg   Sun 6 mg 6 mg 6 mg   Mon 6 mg 6  mg 6 mg   Tue 6 mg 12 mg (9/8); Otherwise 6 mg 6 mg   Wed 6 mg 6 mg 6 mg   Thu 6 mg 6 mg 6 mg   Fri 6 mg 6 mg 6 mg   Sat 6 mg 6 mg 6 mg   Visit Report - - -   Some recent data might be hidden       Plan:  1. INR is therapeutic today- see above in Anticoagulation Summary.   Will instruct Milton Moody to continue their warfarin regimen- see above in Anticoagulation Summary.  2. Follow up in 3 week due to work schedule.  3. Patient declines warfarin refills.  4. Verbal and written information provided. Patient expresses understanding and has no further questions at this time.    Marissa Joshi

## 2020-10-05 RX ORDER — NITROGLYCERIN 0.4 MG/1
TABLET SUBLINGUAL
Qty: 25 TABLET | Refills: 1 | Status: SHIPPED | OUTPATIENT
Start: 2020-10-05 | End: 2020-12-24

## 2020-11-10 ENCOUNTER — ANTICOAGULATION VISIT (OUTPATIENT)
Dept: PHARMACY | Facility: HOSPITAL | Age: 55
End: 2020-11-10

## 2020-11-10 DIAGNOSIS — Z79.01 ANTICOAGULATED ON COUMADIN: ICD-10-CM

## 2020-11-10 DIAGNOSIS — I48.91 ATRIAL FIBRILLATION WITH RAPID VENTRICULAR RESPONSE (HCC): ICD-10-CM

## 2020-11-10 LAB
INR PPP: 1.6 (ref 0.91–1.09)
PROTHROMBIN TIME: 19.3 SECONDS (ref 10–13.8)

## 2020-11-10 PROCEDURE — G0463 HOSPITAL OUTPT CLINIC VISIT: HCPCS

## 2020-11-10 PROCEDURE — 85610 PROTHROMBIN TIME: CPT

## 2020-11-10 PROCEDURE — 36416 COLLJ CAPILLARY BLOOD SPEC: CPT

## 2020-11-10 NOTE — PROGRESS NOTES
Anticoagulation Clinic Progress Note    Anticoagulation Summary  As of 11/10/2020    INR goal:  2.0-3.0   TTR:  59.0 % (8.6 mo)   INR used for dosin.6 (11/10/2020)   Warfarin maintenance plan:  6 mg every day   Weekly warfarin total:  42 mg   Plan last modified:  Kelly Christy RPH (2020)   Next INR check:  2020   Priority:  Maintenance   Target end date:  Indefinite    Indications    Atrial fibrillation with rapid ventricular response (CMS/HCC) [I48.91]  Anticoagulated on Coumadin [Z79.01]             Anticoagulation Episode Summary     INR check location:      Preferred lab:      Send INR reminders to:   [facility]AH [facility] [place]    Comments:  New to warfarin 2/10; See  tele note for warfarin dosing hx. Afib + left atrial appendage thrombus at present; pending cardioversion upon resolution of CARRI thrombus.      Anticoagulation Care Providers     Provider Role Specialty Phone number    Gavin Luke MD Referring Cardiology [phone]          Clinic Interview:  Patient Findings     Positives:  Missed doses, Change in diet/appetite, Other complaints    Negatives:  Signs/symptoms of thrombosis, Signs/symptoms of bleeding,   Laboratory test error suspected, Change in health, Change in alcohol use,   Change in activity, Upcoming invasive procedure, Emergency department   visit, Upcoming dental procedure, Extra doses, Change in medications,   Hospital admission, Bruising    Comments:  Reports 1-2 missed doses recently due to trying to remember to   take his warfarin after 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 He   reports having nerissa greens ~1.5 wks ago, but denies this being a   regular past of his diet.      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 He   reports having nerissa greens ~1.5 wks ago, but denies this being a   regular past of his diet.        INR History:  Anticoagulation Monitoring 9/8/2020 9/29/2020 11/10/2020   INR 1.3 2.9 1.6   INR Date 9/8/2020 9/29/2020 11/10/2020   INR Goal 2.0-3.0 2.0-3.0 2.0-3.0   Trend Same Same Same   Last Week Total 33 mg 42 mg 42 mg   Next Week Total 48 mg 42 mg 45 mg   Sun 6 mg 6 mg 6 mg   Mon 6 mg 6 mg 6 mg   Tue 12 mg (9/8); Otherwise 6 mg 6 mg 9 mg (11/10); Otherwise 6 mg   Wed 6 mg 6 mg 6 mg   Thu 6 mg 6 mg 6 mg   Fri 6 mg 6 mg 6 mg   Sat 6 mg 6 mg 6 mg   Visit Report - - -   Some recent data might be hidden       Plan:  1. INR is Subtherapeutic today- see above in Anticoagulation Summary.  Will instruct Milton Moody to Change their warfarin regimen- see above in Anticoagulation Summary. 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  2. Follow up in 2 weeks  3. Patient declines warfarin refills.  4. Verbal and written information provided. Patient expresses understanding and has no further questions at this time.    Jimi Reid [facility]

## 2020-11-24 ENCOUNTER — ANTICOAGULATION VISIT (OUTPATIENT)
Dept: PHARMACY | Facility: HOSPITAL | Age: 55
End: 2020-11-24

## 2020-11-24 DIAGNOSIS — Z79.01 ANTICOAGULATED ON COUMADIN: ICD-10-CM

## 2020-11-24 DIAGNOSIS — I48.91 ATRIAL FIBRILLATION WITH RAPID VENTRICULAR RESPONSE (HCC): ICD-10-CM

## 2020-11-24 LAB
INR PPP: 2.2 (ref 0.91–1.09)
PROTHROMBIN TIME: 26.1 SECONDS (ref 10–13.8)

## 2020-11-24 PROCEDURE — 36416 COLLJ CAPILLARY BLOOD SPEC: CPT

## 2020-11-24 PROCEDURE — 85610 PROTHROMBIN TIME: CPT

## 2020-11-24 RX ORDER — WARFARIN SODIUM 6 MG/1
TABLET ORAL
Qty: 30 TABLET | Refills: 2 | Status: SHIPPED | OUTPATIENT
Start: 2020-11-24 | End: 2021-02-23

## 2020-11-24 NOTE — PROGRESS NOTES
Anticoagulation Clinic Progress Note    Anticoagulation Summary  As of 2020    INR goal:  2.0-3.0   TTR:  57.7 % (9.1 mo)   INR used for dosin.2 (2020)   Warfarin maintenance plan:  6 mg every day   Weekly warfarin total:  42 mg   No change documented:  Jeanie Briceno   Plan last modified:  Kelly Christy [facility] (2020)   Next INR check:  12/15/2020   Priority:  Maintenance   Target end date:  Indefinite    Indications    Atrial fibrillation with rapid ventricular response (CMS/HCC) [I48.91]  Anticoagulated on Coumadin [Z79.01]             Anticoagulation Episode Summary     INR check location:      Preferred lab:      [facility]    Comments:  New to warfarin 2/10; See  tele note for warfarin dosing hx. Afib + left atrial appendage thrombus at present; pending cardioversion upon resolution of CARRI thrombus.      Anticoagulation Care Providers     Provider Role Specialty Phone number    Gavin Luke MD Referring Cardiology [phone]          Clinic Interview:      INR History:  Anticoagulation Monitoring 2020 11/10/2020 2020   INR 2.9 1.6 2.2   INR Date 2020 11/10/2020 2020   INR Goal 2.0-3.0 2.0-3.0 2.0-3.0   Trend Same Same Same   Last Week Total 42 mg 42 mg 42 mg   Next Week Total 42 mg 45 mg 42 mg   Sun 6 mg 6 mg 6 mg   Mon 6 mg 6 mg 6 mg   Tue 6 mg 9 mg (11/10); Otherwise 6 mg 6 mg   Wed 6 mg 6 mg 6 mg   Thu 6 mg 6 mg 6 mg   Fri 6 mg 6 mg 6 mg   Sat 6 mg 6 mg 6 mg   Visit Report - - -   Some recent data might be hidden       Plan:  1. INR is therapeutic today- see above in Anticoagulation Summary.   Will instruct Milton Moody to continue their warfarin regimen- see above in Anticoagulation Summary.  2. Follow up in 3 weeks.  3. Patient desires warfarin refills.  4. Verbal and written information provided. Patient expresses understanding and has no further questions at this time.    Jeanie Briceno

## 2020-12-18 ENCOUNTER — ANTICOAGULATION VISIT (OUTPATIENT)
Dept: PHARMACY | Facility: HOSPITAL | Age: 55
End: 2020-12-18

## 2020-12-18 DIAGNOSIS — Z79.01 ANTICOAGULATED ON COUMADIN: ICD-10-CM

## 2020-12-18 DIAGNOSIS — I48.91 ATRIAL FIBRILLATION WITH RAPID VENTRICULAR RESPONSE (HCC): ICD-10-CM

## 2020-12-18 LAB
INR PPP: 1.7 (ref 0.91–1.09)
PROTHROMBIN TIME: 20.4 SECONDS (ref 10–13.8)

## 2020-12-18 PROCEDURE — 85610 PROTHROMBIN TIME: CPT

## 2020-12-18 PROCEDURE — G0463 HOSPITAL OUTPT CLINIC VISIT: HCPCS

## 2020-12-18 PROCEDURE — 36416 COLLJ CAPILLARY BLOOD SPEC: CPT

## 2020-12-18 NOTE — PROGRESS NOTES
Anticoagulation Clinic Progress Note    Anticoagulation Summary  As of 2020    INR goal:  2.0-3.0   TTR:  56.3 % (9.9 mo)   INR used for dosin.7 (2020)   Warfarin maintenance plan:  6 mg every day   Weekly warfarin total:  42 mg   Plan last modified:  Kelly Christy RPH (2020)   Next INR check:  2021   Priority:  Maintenance   Target end date:  Indefinite    Indications    Atrial fibrillation with rapid ventricular response (CMS/HCC) [I48.91]  Anticoagulated on Coumadin [Z79.01]             Anticoagulation Episode Summary     INR check location:      Preferred lab:      Send INR reminders to:   JONAH [facility] [place]    Comments:  New to warfarin 2/10; See  tele note for warfarin dosing hx. Afib + left atrial appendage thrombus at present; pending cardioversion upon resolution of CARRI thrombus.      Anticoagulation Care Providers     Provider Role Specialty Phone number    Gavin Luke MD Referring Cardiology [phone]          Clinic Interview:  Patient Findings     Positives:  Missed doses    Negatives:  Signs/symptoms of thrombosis, Signs/symptoms of bleeding,   Laboratory test error suspected, Change in health, Change in alcohol use,   Change in activity, Upcoming invasive procedure, Emergency department   visit, Upcoming dental procedure, Extra doses, Change in medications,   Change in diet/appetite, Hospital admission, Bruising, Other complaints      Clinical Outcomes     Negatives:  Major bleeding event, Thromboembolic event,   Anticoagulation-related hospital admission, Anticoagulation-related ED   visit, Anticoagulation-related fatality        INR History:  Anticoagulation Monitoring 11/10/2020 2020 2020   INR 1.6 2.2 1.7   INR Date 11/10/2020 2020 2020   INR Goal 2.0-3.0 2.0-3.0 2.0-3.0   Trend Same Same Same   Last Week Total 42 mg 42 mg 42 mg   Next Week Total 45 mg 42 mg 45 mg   Sun 6 mg 6 mg 6 mg   Mon 6 mg 6 mg 6 mg   Tue 9 mg  (11/10); Otherwise 6 mg 6 mg 6 mg   Wed 6 mg 6 mg 6 mg   Thu 6 mg 6 mg 6 mg   Fri 6 mg 6 mg 9 mg (12/18); Otherwise 6 mg   Sat 6 mg 6 mg 6 mg   Visit Report - - -   Some recent data might be hidden       Plan:  1. INR is Subtherapeutic today- see above in Anticoagulation Summary.  Will instruct Milton Moody to boost warfarin dose today, then continue their warfarin regimen- see above in Anticoagulation Summary.  2. Follow up in 2.5 weeks  3. Patient declines warfarin refills.  4. Verbal and written information provided. Patient expresses understanding and has no further questions at this time.    Mary Alarcon [facility]

## 2020-12-29 RX ORDER — NITROGLYCERIN 0.4 MG/1
TABLET SUBLINGUAL
Qty: 25 TABLET | Refills: 1 | Status: SHIPPED | OUTPATIENT
Start: 2020-12-29 | End: 2021-04-02

## 2021-01-05 ENCOUNTER — OFFICE VISIT (OUTPATIENT)
Dept: CARDIOLOGY | Facility: CLINIC | Age: 56
End: 2021-01-05

## 2021-01-05 ENCOUNTER — ANTICOAGULATION VISIT (OUTPATIENT)
Dept: PHARMACY | Facility: HOSPITAL | Age: 56
End: 2021-01-05

## 2021-01-05 ENCOUNTER — LAB (OUTPATIENT)
Dept: LAB | Facility: HOSPITAL | Age: 56
End: 2021-01-05

## 2021-01-05 VITALS
OXYGEN SATURATION: 99 % | BODY MASS INDEX: 46.65 KG/M2 | WEIGHT: 315 LBS | SYSTOLIC BLOOD PRESSURE: 138 MMHG | HEART RATE: 106 BPM | DIASTOLIC BLOOD PRESSURE: 90 MMHG | HEIGHT: 69 IN

## 2021-01-05 DIAGNOSIS — I48.19 ATRIAL FIBRILLATION, PERSISTENT (HCC): Primary | ICD-10-CM

## 2021-01-05 DIAGNOSIS — Z79.01 ANTICOAGULATED ON COUMADIN: ICD-10-CM

## 2021-01-05 DIAGNOSIS — I48.19 ATRIAL FIBRILLATION, PERSISTENT (HCC): ICD-10-CM

## 2021-01-05 DIAGNOSIS — I42.8 NICM (NONISCHEMIC CARDIOMYOPATHY) (HCC): ICD-10-CM

## 2021-01-05 DIAGNOSIS — I48.91 ATRIAL FIBRILLATION WITH RAPID VENTRICULAR RESPONSE (HCC): ICD-10-CM

## 2021-01-05 LAB
ALBUMIN SERPL-MCNC: 3.9 G/DL (ref 3.5–5.2)
ALBUMIN/GLOB SERPL: 1 G/DL
ALP SERPL-CCNC: 110 U/L (ref 39–117)
ALT SERPL W P-5'-P-CCNC: 25 U/L (ref 1–41)
ANION GAP SERPL CALCULATED.3IONS-SCNC: 9.1 MMOL/L (ref 5–15)
AST SERPL-CCNC: 26 U/L (ref 1–40)
BASOPHILS # BLD AUTO: 0.05 10*3/MM3 (ref 0–0.2)
BASOPHILS NFR BLD AUTO: 0.9 % (ref 0–1.5)
BILIRUB SERPL-MCNC: 0.6 MG/DL (ref 0–1.2)
BUN SERPL-MCNC: 14 MG/DL (ref 6–20)
BUN/CREAT SERPL: 11.1 (ref 7–25)
CALCIUM SPEC-SCNC: 8.9 MG/DL (ref 8.6–10.5)
CHLORIDE SERPL-SCNC: 99 MMOL/L (ref 98–107)
CO2 SERPL-SCNC: 27.9 MMOL/L (ref 22–29)
CREAT SERPL-MCNC: 1.26 MG/DL (ref 0.76–1.27)
DEPRECATED RDW RBC AUTO: 41.1 FL (ref 37–54)
EOSINOPHIL # BLD AUTO: 0.06 10*3/MM3 (ref 0–0.4)
EOSINOPHIL NFR BLD AUTO: 1 % (ref 0.3–6.2)
ERYTHROCYTE [DISTWIDTH] IN BLOOD BY AUTOMATED COUNT: 13.4 % (ref 12.3–15.4)
GFR SERPL CREATININE-BSD FRML MDRD: 72 ML/MIN/1.73
GLOBULIN UR ELPH-MCNC: 3.8 GM/DL
GLUCOSE SERPL-MCNC: 101 MG/DL (ref 65–99)
HCT VFR BLD AUTO: 52.7 % (ref 37.5–51)
HGB BLD-MCNC: 17.7 G/DL (ref 13–17.7)
IMM GRANULOCYTES # BLD AUTO: 0.02 10*3/MM3 (ref 0–0.05)
IMM GRANULOCYTES NFR BLD AUTO: 0.3 % (ref 0–0.5)
INR PPP: 1.6 (ref 0.91–1.09)
LYMPHOCYTES # BLD AUTO: 2.04 10*3/MM3 (ref 0.7–3.1)
LYMPHOCYTES NFR BLD AUTO: 35.4 % (ref 19.6–45.3)
MCH RBC QN AUTO: 28.7 PG (ref 26.6–33)
MCHC RBC AUTO-ENTMCNC: 33.6 G/DL (ref 31.5–35.7)
MCV RBC AUTO: 85.4 FL (ref 79–97)
MONOCYTES # BLD AUTO: 0.61 10*3/MM3 (ref 0.1–0.9)
MONOCYTES NFR BLD AUTO: 10.6 % (ref 5–12)
NEUTROPHILS NFR BLD AUTO: 2.98 10*3/MM3 (ref 1.7–7)
NEUTROPHILS NFR BLD AUTO: 51.8 % (ref 42.7–76)
NRBC BLD AUTO-RTO: 0 /100 WBC (ref 0–0.2)
NT-PROBNP SERPL-MCNC: 357.2 PG/ML (ref 0–900)
PLATELET # BLD AUTO: 267 10*3/MM3 (ref 140–450)
PMV BLD AUTO: 9.7 FL (ref 6–12)
POTASSIUM SERPL-SCNC: 4.2 MMOL/L (ref 3.5–5.2)
PROT SERPL-MCNC: 7.7 G/DL (ref 6–8.5)
PROTHROMBIN TIME: 19 SECONDS (ref 10–13.8)
RBC # BLD AUTO: 6.17 10*6/MM3 (ref 4.14–5.8)
SODIUM SERPL-SCNC: 136 MMOL/L (ref 136–145)
WBC # BLD AUTO: 5.76 10*3/MM3 (ref 3.4–10.8)

## 2021-01-05 PROCEDURE — 80053 COMPREHEN METABOLIC PANEL: CPT

## 2021-01-05 PROCEDURE — 36415 COLL VENOUS BLD VENIPUNCTURE: CPT

## 2021-01-05 PROCEDURE — 36416 COLLJ CAPILLARY BLOOD SPEC: CPT

## 2021-01-05 PROCEDURE — G0463 HOSPITAL OUTPT CLINIC VISIT: HCPCS

## 2021-01-05 PROCEDURE — 93000 ELECTROCARDIOGRAM COMPLETE: CPT | Performed by: INTERNAL MEDICINE

## 2021-01-05 PROCEDURE — 85610 PROTHROMBIN TIME: CPT

## 2021-01-05 PROCEDURE — 99214 OFFICE O/P EST MOD 30 MIN: CPT | Performed by: INTERNAL MEDICINE

## 2021-01-05 PROCEDURE — 85025 COMPLETE CBC W/AUTO DIFF WBC: CPT

## 2021-01-05 PROCEDURE — 83880 ASSAY OF NATRIURETIC PEPTIDE: CPT

## 2021-01-05 RX ORDER — AMOXICILLIN 250 MG
1 CAPSULE ORAL DAILY
COMMUNITY

## 2021-01-05 NOTE — PROGRESS NOTES
Anticoagulation Clinic Progress Note    Anticoagulation Summary  As of 2021    INR goal:  2.0-3.0   TTR:  53.1 % (10.5 mo)   INR used for dosin.6 (2021)   Warfarin maintenance plan:  9 mg every Tue; 6 mg all other days   Weekly warfarin total:  45 mg   Plan last modified:  Rani Dhillon [facility] (2021)   Next INR check:  2021   Priority:  Maintenance   Target end date:  Indefinite    Indications    Atrial fibrillation with rapid ventricular response (CMS/HCC) [I48.91]  Anticoagulated on Coumadin [Z79.01]             Anticoagulation Episode Summary     INR check location:      Preferred lab:      Send INR reminders to:   JONAH DIAMOND [facility]    Comments:  New to warfarin 2/10; See  tele note for warfarin dosing hx. Afib + left atrial appendage thrombus at present; pending cardioversion upon resolution of CARRI thrombus.      Anticoagulation Care Providers     Provider Role Specialty Phone number    Gavin Luke MD Referring Cardiology [phone]          Clinic Interview:  Patient Findings     Positives:  Change in diet/appetite    Negatives:  Signs/symptoms of thrombosis, Signs/symptoms of bleeding,   Laboratory test error suspected, Change in health, Change in alcohol use,   Change in activity, Upcoming invasive procedure, Emergency department   visit, Upcoming dental procedure, Missed doses, Extra doses, Change in   medications, Hospital admission, Bruising, Other complaints    Comments:  Had some broccoli this weekend.       Clinical Outcomes     Negatives:  Major bleeding event, Thromboembolic event,   Anticoagulation-related hospital admission, Anticoagulation-related ED   visit, Anticoagulation-related fatality    Comments:  Had some broccoli this weekend.         INR History:  Anticoagulation Monitoring 2020   INR 2.2 1.7 1.6   INR Date 2020   INR Goal 2.0-3.0 2.0-3.0 2.0-3.0   Trend Same Same Up   Last Week Total  42 mg 42 mg 42 mg   Next Week Total 42 mg 45 mg 45 mg   Sun 6 mg 6 mg 6 mg   Mon 6 mg 6 mg 6 mg   Tue 6 mg 6 mg 9 mg   Wed 6 mg 6 mg 6 mg   Thu 6 mg 6 mg 6 mg   Fri 6 mg 9 mg (12/18); Otherwise 6 mg 6 mg   Sat 6 mg 6 mg 6 mg   Visit Report - - -   Some recent data might be hidden       Plan:  1. INR is Subtherapeutic today- see above in Anticoagulation Summary.  Will instruct Milton Moody to increase his weekly warfarin dosage regimen by 7%, starting today - see above in Anticoagulation Summary.  2. Follow up in 2 weeks  3. Patient declines warfarin refills.  4. Verbal and written information provided. Patient expresses understanding and has no further questions at this time.    Rani Dhillon [facility]

## 2021-01-19 ENCOUNTER — ANTICOAGULATION VISIT (OUTPATIENT)
Dept: PHARMACY | Facility: HOSPITAL | Age: 56
End: 2021-01-19

## 2021-01-19 DIAGNOSIS — Z79.01 ANTICOAGULATED ON COUMADIN: ICD-10-CM

## 2021-01-19 DIAGNOSIS — I48.91 ATRIAL FIBRILLATION WITH RAPID VENTRICULAR RESPONSE (HCC): ICD-10-CM

## 2021-01-19 LAB
INR PPP: 2.3 (ref 0.91–1.09)
PROTHROMBIN TIME: 28 SECONDS (ref 10–13.8)

## 2021-01-19 PROCEDURE — 36416 COLLJ CAPILLARY BLOOD SPEC: CPT

## 2021-01-19 PROCEDURE — 85610 PROTHROMBIN TIME: CPT

## 2021-01-19 NOTE — PROGRESS NOTES
Anticoagulation Clinic Progress Note    Anticoagulation Summary  As of 2021    INR goal:  2.0-3.0   TTR:  52.6 % (10.9 mo)   INR used for dosin.3 (2021)   Warfarin maintenance plan:  9 mg every Tue; 6 mg all other days   Weekly warfarin total:  45 mg   No change documented:  Rochelle Meyers   Plan last modified:  Rani Dhillon [facility] (2021)   Next INR check:  2021   Priority:  Maintenance   Target end date:  Indefinite    Indications    Atrial fibrillation with rapid ventricular response (CMS/HCC) [I48.91]  Anticoagulated on Coumadin [Z79.01]             Anticoagulation Episode Summary     INR check location:      Preferred lab:      Send INR reminders to:   JONAH [facility]    Comments:  New to warfarin 2/10; See  tele note for warfarin dosing hx. Afib + left atrial appendage thrombus at present; pending cardioversion upon resolution of CARRI thrombus.      Anticoagulation Care Providers     Provider Role Specialty Phone number    Gavin Luke MD Referring Cardiology [phone]          Clinic Interview:  Patient Findings     Negatives:  Signs/symptoms of thrombosis, Signs/symptoms of bleeding,   Laboratory test error suspected, Change in health, Change in alcohol use,   Change in activity, Upcoming invasive procedure, Emergency department   visit, Upcoming dental procedure, Missed doses, Extra doses, Change in   medications, Change in diet/appetite, Hospital admission, Bruising, Other   complaints      Clinical Outcomes     Negatives:  Major bleeding event, Thromboembolic event,   Anticoagulation-related hospital admission, Anticoagulation-related ED   visit, Anticoagulation-related fatality        INR History:  Anticoagulation Monitoring 2020   INR 1.7 1.6 2.3   INR Date 2020   INR Goal 2.0-3.0 2.0-3.0 2.0-3.0   Trend Same Up Same   Last Week Total 42 mg 42 mg 45 mg   Next Week Total 45 mg 45 mg 45 mg    Sun 6 mg 6 mg 6 mg   Mon 6 mg 6 mg 6 mg   Tue 6 mg 9 mg 9 mg   Wed 6 mg 6 mg 6 mg   Thu 6 mg 6 mg 6 mg   Fri 9 mg (12/18); Otherwise 6 mg 6 mg 6 mg   Sat 6 mg 6 mg 6 mg   Visit Report - - -   Some recent data might be hidden       Plan:  1. INR is therapeutic today- see above in Anticoagulation Summary.   Will instruct Milton Moody to continue their warfarin regimen- see above in Anticoagulation Summary.  2. Follow up in 2 weeks.  3. Patient declines warfarin refills.  4. Verbal and written information provided. Patient expresses understanding and has no further questions at this time.    Rochelle Meyers

## 2021-02-02 ENCOUNTER — ANTICOAGULATION VISIT (OUTPATIENT)
Dept: PHARMACY | Facility: HOSPITAL | Age: 56
End: 2021-02-02

## 2021-02-02 DIAGNOSIS — Z79.01 ANTICOAGULATED ON COUMADIN: ICD-10-CM

## 2021-02-02 DIAGNOSIS — I48.91 ATRIAL FIBRILLATION WITH RAPID VENTRICULAR RESPONSE (HCC): ICD-10-CM

## 2021-02-02 LAB
INR PPP: 2 (ref 0.91–1.09)
PROTHROMBIN TIME: 24.4 SECONDS (ref 10–13.8)

## 2021-02-02 PROCEDURE — 85610 PROTHROMBIN TIME: CPT

## 2021-02-02 PROCEDURE — 36416 COLLJ CAPILLARY BLOOD SPEC: CPT

## 2021-02-02 NOTE — PROGRESS NOTES
Anticoagulation Clinic Progress Note    Anticoagulation Summary  As of 2021    INR goal:  2.0-3.0   TTR:  54.6 % (11.4 mo)   INR used for dosin.0 (2021)   Warfarin maintenance plan:  9 mg every Tue; 6 mg all other days   Weekly warfarin total:  45 mg   No change documented:  Jeanie Briceno   Plan last modified:  Rani Dhillon, [facility] (2021)   Next INR check:  3/2/2021   Priority:  Maintenance   Target end date:  Indefinite    Indications    Atrial fibrillation with rapid ventricular response (CMS/HCC) [I48.91]  Anticoagulated on Coumadin [Z79.01]             Anticoagulation Episode Summary     INR check location:      Preferred lab:      Send INR reminders to:   JONAH [facility]    Comments:  New to warfarin 2/10; See  tele note for warfarin dosing hx. Afib + left atrial appendage thrombus at present; pending cardioversion upon resolution of CARRI thrombus.      Anticoagulation Care Providers     Provider Role Specialty Phone number    Gavin Luke MD Referring Cardiology [phone]          Clinic Interview:      INR History:  Anticoagulation Monitoring 2021   INR 1.6 2.3 2.0   INR Date 2021   INR Goal 2.0-3.0 2.0-3.0 2.0-3.0   Trend Up Same Same   Last Week Total 42 mg 45 mg 45 mg   Next Week Total 45 mg 45 mg 45 mg   Sun 6 mg 6 mg 6 mg   Mon 6 mg 6 mg 6 mg   Tue 9 mg 9 mg 9 mg   Wed 6 mg 6 mg 6 mg   Thu 6 mg 6 mg 6 mg   Fri 6 mg 6 mg 6 mg   Sat 6 mg 6 mg 6 mg   Visit Report - - -   Some recent data might be hidden       Plan:  1. INR is therapeutic today- see above in Anticoagulation Summary.   Will instruct Milton Moody to continue their warfarin regimen- see above in Anticoagulation Summary.  2. Follow up in 4 weeks.  3. Patient declines warfarin refills.  4. Verbal and written information provided. Patient expresses understanding and has no further questions at this time.    Jeanie Briceno

## 2021-02-08 RX ORDER — LISINOPRIL 5 MG/1
5 TABLET ORAL
Qty: 30 TABLET | Refills: 11 | Status: SHIPPED | OUTPATIENT
Start: 2021-02-08 | End: 2021-10-08

## 2021-02-08 RX ORDER — DIGOXIN 125 MCG
125 TABLET ORAL
Qty: 30 TABLET | Refills: 11 | Status: SHIPPED | OUTPATIENT
Start: 2021-02-08 | End: 2021-10-08

## 2021-02-23 RX ORDER — WARFARIN SODIUM 6 MG/1
TABLET ORAL
Qty: 30 TABLET | Refills: 2 | Status: SHIPPED | OUTPATIENT
Start: 2021-02-23 | End: 2021-03-30 | Stop reason: SDUPTHER

## 2021-03-02 ENCOUNTER — ANTICOAGULATION VISIT (OUTPATIENT)
Dept: PHARMACY | Facility: HOSPITAL | Age: 56
End: 2021-03-02

## 2021-03-02 DIAGNOSIS — I48.91 ATRIAL FIBRILLATION WITH RAPID VENTRICULAR RESPONSE (HCC): ICD-10-CM

## 2021-03-02 DIAGNOSIS — Z79.01 ANTICOAGULATED ON COUMADIN: ICD-10-CM

## 2021-03-02 LAB
INR PPP: 2.6 (ref 0.91–1.09)
PROTHROMBIN TIME: 30.6 SECONDS (ref 10–13.8)

## 2021-03-02 PROCEDURE — 85610 PROTHROMBIN TIME: CPT

## 2021-03-02 PROCEDURE — 36416 COLLJ CAPILLARY BLOOD SPEC: CPT

## 2021-03-02 NOTE — PROGRESS NOTES
Anticoagulation Clinic Progress Note    Anticoagulation Summary  As of 3/2/2021    INR goal:  2.0-3.0   TTR:  58.0 % (1 y)   INR used for dosin.6 (3/2/2021)   Warfarin maintenance plan:  9 mg every Tue; 6 mg all other days   Weekly warfarin total:  45 mg   No change documented:  Rochelle Meyers   Plan last modified:  Rani Dhillon, [facility] (2021)   Next INR check:  3/30/2021   Priority:  Maintenance   Target end date:  Indefinite    Indications    Atrial fibrillation with rapid ventricular response (CMS/HCC) [I48.91]  Anticoagulated on Coumadin [Z79.01]             Anticoagulation Episode Summary     INR check location:      Preferred lab:      [facility]:   [facility]    Comments:  New to warfarin 2/10; See  tele note for warfarin dosing hx. Afib + left atrial appendage thrombus at present; pending cardioversion upon resolution of CARRI thrombus.      Anticoagulation Care Providers     Provider Role Specialty Phone number    Gavin Luke MD Referring Cardiology [phone]          Clinic Interview:      INR History:  Anticoagulation Monitoring 2021 2021 3/2/2021   INR 2.3 2.0 2.6   INR Date 2021 2021 3/2/2021   INR Goal 2.0-3.0 2.0-3.0 2.0-3.0   Trend Same Same Same   Last Week Total 45 mg 45 mg 45 mg   Next Week Total 45 mg 45 mg 45 mg   Sun 6 mg 6 mg 6 mg   Mon 6 mg 6 mg 6 mg   Tue 9 mg 9 mg 9 mg   Wed 6 mg 6 mg 6 mg   Thu 6 mg 6 mg 6 mg   Fri 6 mg 6 mg 6 mg   Sat 6 mg 6 mg 6 mg   Visit Report - - -   Some recent data might be hidden       Plan:  1. INR is therapeutic today- see above in Anticoagulation Summary.   Will instruct Milton Moody to continue their warfarin regimen- see above in Anticoagulation Summary.  2. Follow up in 4 weeks.  3. Patient declines warfarin refills.  4. Verbal and written information provided. Patient expresses understanding and has no further questions at this time.    Rochelle Meyers

## 2021-03-30 ENCOUNTER — ANTICOAGULATION VISIT (OUTPATIENT)
Dept: PHARMACY | Facility: HOSPITAL | Age: 56
End: 2021-03-30

## 2021-03-30 DIAGNOSIS — Z79.01 ANTICOAGULATED ON COUMADIN: ICD-10-CM

## 2021-03-30 DIAGNOSIS — I48.91 ATRIAL FIBRILLATION WITH RAPID VENTRICULAR RESPONSE (HCC): ICD-10-CM

## 2021-03-30 LAB
INR PPP: 2.2 (ref 0.91–1.09)
PROTHROMBIN TIME: 26.5 SECONDS (ref 10–13.8)

## 2021-03-30 PROCEDURE — 85610 PROTHROMBIN TIME: CPT

## 2021-03-30 PROCEDURE — 36416 COLLJ CAPILLARY BLOOD SPEC: CPT

## 2021-03-30 RX ORDER — WARFARIN SODIUM 6 MG/1
TABLET ORAL
Qty: 100 TABLET | Refills: 1 | Status: SHIPPED | OUTPATIENT
Start: 2021-03-30 | End: 2021-08-12 | Stop reason: SDUPTHER

## 2021-03-30 NOTE — PROGRESS NOTES
Anticoagulation Clinic Progress Note    Anticoagulation Summary  As of 3/30/2021    INR goal:  2.0-3.0   TTR:  61.0 % (1.1 y)   INR used for dosin.2 (3/30/2021)   Warfarin maintenance plan:  9 mg every Tue; 6 mg all other days   Weekly warfarin total:  45 mg   No change documented:  Shilpa Weinstein, Pharmacy Intern   Plan last modified:  Rani Dhillon [facility] (2021)   Next INR check:  2021   Priority:  Maintenance   Target end date:  Indefinite    Indications    Atrial fibrillation with rapid ventricular response (CMS/HCC) [I48.91]  Anticoagulated on Coumadin [Z79.01]             Anticoagulation Episode Summary     INR check location:      Preferred lab:      [facility]    Comments:  New to warfarin 2/10; See  tele note for warfarin dosing hx. Afib + left atrial appendage thrombus at present; pending cardioversion upon resolution of CARRI thrombus.      Anticoagulation Care Providers     Provider Role Specialty Phone number    Gavin Luke MD Referring Cardiology [phone]          Clinic Interview:  Patient Findings     Positives:  Upcoming invasive procedure    Negatives:  Signs/symptoms of thrombosis, Signs/symptoms of bleeding,   Laboratory test error suspected, Change in health, Change in alcohol use,   Change in activity, Emergency department visit, Upcoming dental procedure,   Missed doses, Extra doses, Change in medications, Change in diet/appetite,   Hospital admission, Bruising, Other complaints    Comments:  Patient has a rotator cuff procedure scheduled for        Clinical Outcomes     Negatives:  Major bleeding event, Thromboembolic event,   Anticoagulation-related hospital admission, Anticoagulation-related ED   visit, Anticoagulation-related fatality    Comments:  Patient has a rotator cuff procedure scheduled for          INR History:  Anticoagulation Monitoring 2021 3/2/2021 3/30/2021   INR 2.0 2.6 2.2   INR Date  2/2/2021 3/2/2021 3/30/2021   INR Goal 2.0-3.0 2.0-3.0 2.0-3.0   Trend Same Same Same   Last Week Total 45 mg 45 mg 45 mg   Next Week Total 45 mg 45 mg 45 mg   Sun 6 mg 6 mg 6 mg   Mon 6 mg 6 mg 6 mg   Tue 9 mg 9 mg 9 mg   Wed 6 mg 6 mg 6 mg   Thu 6 mg 6 mg 6 mg   Fri 6 mg 6 mg 6 mg   Sat 6 mg 6 mg 6 mg   Visit Report - - -   Some recent data might be hidden       Plan:  1. INR is Therapeutic today- see above in Anticoagulation Summary.  Will instruct Milton Moody to Continue their warfarin regimen- see above in Anticoagulation Summary.  2. Follow up in 1 month  3. Patient desires warfarin refills.  4. Verbal and written information provided. Patient expresses understanding and has no further questions at this time.    Shilpa Weinstein, Pharmacy Intern

## 2021-03-30 NOTE — PROGRESS NOTES
I have supervised and reviewed the notes, assessments, and/or procedures performed by our Pharmacy Intern. I concur with the documentation of this patient encounter.    Jimi Reid [facility]

## 2021-04-02 RX ORDER — NITROGLYCERIN 0.4 MG/1
TABLET SUBLINGUAL
Qty: 15 TABLET | Refills: 0 | Status: SHIPPED | OUTPATIENT
Start: 2021-04-02 | End: 2021-04-08 | Stop reason: SDUPTHER

## 2021-04-02 NOTE — TELEPHONE ENCOUNTER
Did you want to continue to refill for patient?  Do not see in last note whether thsi was supposed to be continued?

## 2021-04-08 RX ORDER — NITROGLYCERIN 0.4 MG/1
0.4 TABLET SUBLINGUAL
Qty: 100 TABLET | Refills: 3 | Status: SHIPPED | OUTPATIENT
Start: 2021-04-08 | End: 2021-04-09

## 2021-04-19 ENCOUNTER — TELEPHONE (OUTPATIENT)
Dept: CARDIOLOGY | Facility: CLINIC | Age: 56
End: 2021-04-19

## 2021-04-19 NOTE — TELEPHONE ENCOUNTER
I was the on call provider---called to say that [facility] still has never received prescription for carvedilol.     They have been out for at least 2-3 weeks now---looks like was on 25 mg BID but looks like the quantity on the script is only 30 tablets    Told them I would have some one check on this first thing in the morning---? Still want him on same dose and why his he only getting 30 tablets?     Please check into this and call patient in am to let them know it has been taken care of.     Skye--copying you on this because they have appt with you on 4/21 at 8:30

## 2021-04-19 NOTE — TELEPHONE ENCOUNTER
Attempted to call both  and wife to notify that rx will be at the pharmacy today. Left a detailed msg on spouse cell.

## 2021-04-20 RX ORDER — CARVEDILOL 25 MG/1
25 TABLET ORAL 2 TIMES DAILY WITH MEALS
Qty: 180 TABLET | Refills: 3 | Status: SHIPPED | OUTPATIENT
Start: 2021-04-20 | End: 2022-02-23

## 2021-04-20 RX ORDER — CARVEDILOL 25 MG/1
TABLET ORAL
Qty: 180 TABLET | Refills: 2 | Status: SHIPPED | OUTPATIENT
Start: 2021-04-20 | End: 2021-04-20 | Stop reason: SDUPTHER

## 2021-04-21 ENCOUNTER — OFFICE VISIT (OUTPATIENT)
Dept: CARDIOLOGY | Facility: CLINIC | Age: 56
End: 2021-04-21

## 2021-04-21 ENCOUNTER — ANTICOAGULATION VISIT (OUTPATIENT)
Dept: PHARMACY | Facility: HOSPITAL | Age: 56
End: 2021-04-21

## 2021-04-21 VITALS
WEIGHT: 315 LBS | HEART RATE: 94 BPM | BODY MASS INDEX: 46.65 KG/M2 | SYSTOLIC BLOOD PRESSURE: 150 MMHG | DIASTOLIC BLOOD PRESSURE: 110 MMHG | HEIGHT: 69 IN

## 2021-04-21 DIAGNOSIS — I10 ESSENTIAL HYPERTENSION: ICD-10-CM

## 2021-04-21 DIAGNOSIS — I48.91 ATRIAL FIBRILLATION WITH RAPID VENTRICULAR RESPONSE (HCC): Primary | ICD-10-CM

## 2021-04-21 DIAGNOSIS — Z01.810 PREOP CARDIOVASCULAR EXAM: Primary | ICD-10-CM

## 2021-04-21 DIAGNOSIS — R06.09 DOE (DYSPNEA ON EXERTION): ICD-10-CM

## 2021-04-21 DIAGNOSIS — I48.19 ATRIAL FIBRILLATION, PERSISTENT (HCC): ICD-10-CM

## 2021-04-21 DIAGNOSIS — I44.7 LBBB (LEFT BUNDLE BRANCH BLOCK): ICD-10-CM

## 2021-04-21 DIAGNOSIS — Z79.01 ANTICOAGULATED ON COUMADIN: ICD-10-CM

## 2021-04-21 DIAGNOSIS — I42.9 CARDIOMYOPATHY, UNSPECIFIED TYPE (HCC): ICD-10-CM

## 2021-04-21 LAB
INR PPP: 2.4 (ref 0.91–1.09)
PROTHROMBIN TIME: 29.2 SECONDS (ref 10–13.8)

## 2021-04-21 PROCEDURE — 36416 COLLJ CAPILLARY BLOOD SPEC: CPT

## 2021-04-21 PROCEDURE — G0463 HOSPITAL OUTPT CLINIC VISIT: HCPCS

## 2021-04-21 PROCEDURE — 99214 OFFICE O/P EST MOD 30 MIN: CPT | Performed by: NURSE PRACTITIONER

## 2021-04-21 PROCEDURE — 85610 PROTHROMBIN TIME: CPT

## 2021-04-21 PROCEDURE — 93000 ELECTROCARDIOGRAM COMPLETE: CPT | Performed by: NURSE PRACTITIONER

## 2021-04-21 NOTE — PROGRESS NOTES
Date of Office Visit: 2021  Encounter Provider: Jenniffer Manning, JEROME, APRN  Place of Service: [facility] CARDIOLOGY  Patient Name: Milton Moody  :1965        Subjective:     Chief Complaint:  Preop cardiovascular exam, persistent atrial fibrillation, cardiomyopathy, hypertension, morbid obesity      History of Present Illness:  Milton Moody is a 55 y.o. male patient of Dr. Luke.  This patient is new to me and I have reviewed his records.    Patient has a history of persistent atrial fibrillation, cardiomyopathy, hypertension, morbid obesity, hyperlipidemia, chronic kidney disease, anxiety.    Patient has a history of cardiomyopathy felt to be tachycardia induced, left atrial appendage thrombus treated with chronic anticoagulation therapy.  Last echocardiogram 2020 showed improvement in EF to 36 to 40% and no significant valvular stenosis or regurgitation.  Last seen in the office 2021 at which point he was in atrial fibrillation and reported blood pressure at home of been staying 120s and heart rate was well controlled.      Patient presents to office today for follow-up appointment.  Patient reports he is doing okay overall since last visit.  His blood pressure is little high in the office today after walking in from the parking lot but he reports it has been well controlled at home, around 120 systolic.  He has some chronic intermittent shortness of breath with exertion.  He attributes this in some part to his weight and inactivity.  He reports that he is needing right shoulder surgery.  The right shoulder is quite painful and locks up and he has some tenderness radiating down to his right upper chest.  Does not have any chest pain or discomfort with activities or with walking.  Unfortunately he is not able to complete greater than 4 METS.  He does get an occasional brief flutter when he is having a panic attack but no true palpitations.  Denies any syncope,  near syncope, falls, edema, or abnormal bleeding.  He appears euvolemic on exam.        Past Medical History:   Diagnosis Date   • Abnormal ECG    • Asthma    • Atrial fibrillation (CMS/[facility])    • Chest pain    • CKD (chronic kidney disease) stage 3, GFR 30-59 ml/min (CMS/[facility]) 3/4/2020   • Hyperlipidemia    • Hypertension    • NICM (nonischemic cardiomyopathy) (CMS/[facility]) 3/4/2020   • Nonischemic cardiomyopathy (CMS/[facility])    • Palpitations    • Rapid heart rate    • Sleep apnea      Past Surgical History:   Procedure Laterality Date   • KNEE SURGERY Left 2019   • SHOULDER ROTATOR CUFF REPAIR Right 01/2020     Outpatient Medications Prior to Visit   Medication Sig Dispense Refill   • atorvastatin (LIPITOR) 40 MG tablet Take 40 mg by mouth Every Night.     • carvedilol (COREG) 25 MG tablet Take 1 tablet by mouth 2 (Two) Times a Day With Meals. 180 tablet 3   • digoxin (LANOXIN) 125 MCG tablet TAKE 1 TABLET BY MOUTH DAILY 30 tablet 11   • famotidine (PEPCID) 40 MG tablet Take 1 tablet by mouth Daily.     • HYDROcodone-acetaminophen (NORCO)  MG per tablet TK 1 T PO  Q 12 H PRN P     • lisinopril (PRINIVIL,ZESTRIL) 5 MG tablet TAKE 1 TABLET BY MOUTH DAILY 30 tablet 11   • sennosides-docusate (Stimulant Laxative) 8.6-50 MG per tablet Take 1 tablet by mouth Daily.     • warfarin (COUMADIN) 6 MG tablet Take one and one-half tablets (9 mg) by mouth on Tues, and take one tablet (6 mg) by mouth all other days or as directed. 100 tablet 1     No facility-administered medications prior to visit.       Allergies as of 04/21/2021 - Reviewed 04/21/2021   Allergen Reaction Noted   • Seasonal ic [cholestatin] Unknown - Low Severity 02/10/2020     Social History     Socioeconomic History   • Marital status:      Spouse name: Not on file   • Number of children: Not on file   • Years of education: Not on file   • Highest education level: Not on file   Tobacco Use   • Smoking status: Former Smoker     Packs/day: 0.10     Years:  "3.00     Pack years: 0.30     Types: Cigarettes   • Smokeless tobacco: Former User     Types: Snuff   Substance and Sexual Activity   • Alcohol use: Yes     Alcohol/week: 6.0 standard drinks     Types: 6 Cans of beer per week     Comment: weekend alcohol use   • Drug use: Not Currently     Types: Marijuana   • Sexual activity: Not Currently     Partners: Female     Family History   Problem Relation Age of Onset   • Hypertension Mother    • Hypertension Sister        Review of Systems   Constitutional: Positive for weight gain. Negative for chills, fever and malaise/fatigue.   Cardiovascular:        SEE HPI   Hematologic/Lymphatic: Negative for bleeding problem.   Musculoskeletal: Positive for joint pain (Right shoulder). Negative for falls.   Gastrointestinal: Negative for diarrhea, melena, nausea and vomiting.   Genitourinary: Negative for hematuria.   Psychiatric/Behavioral: Negative for altered mental status.          Objective:     Vitals:    04/21/21 0855   BP: (!) 150/110   BP Location: Left arm   Cuff Size: Large Adult   Pulse: 94   Weight: (!) 167 kg (367 lb 3.2 oz)   Height: 175.3 cm (69\")       BP ON RECHECK 128/78 AFTER SITTING.      Body mass index is 54.23 kg/m².      PHYSICAL EXAM:  Constitutional:       General: Not in acute distress.     Appearance: Well-developed. Morbidly obese. Not diaphoretic.   Eyes:      Pupils: Pupils are equal, round, and reactive to light.   HENT:      Head: Normocephalic and atraumatic.   Neck:      Vascular: No carotid bruit.   Pulmonary:      Effort: Pulmonary effort is normal. No respiratory distress.      Breath sounds: Normal breath sounds. No wheezing. No rales.   Cardiovascular:      Normal rate. Irregularly irregular rhythm.      Murmurs: There is no murmur.      No gallop. No click. No rub.   Edema:     Peripheral edema absent.   Abdominal:      General: Bowel sounds are normal. There is no distension.      Palpations: Abdomen is soft.   Musculoskeletal: Normal " range of motion.         General: No tenderness or deformity.      Cervical back: Neck supple. Skin:     General: Skin is warm and dry.      Findings: No erythema or rash.   Neurological:      Mental Status: Alert and oriented to person, place, and time.   Psychiatric:         Behavior: Behavior normal.         Judgment: Judgment normal.             ECG 12 Lead    Date/Time: 4/21/2021 9:18 AM  Performed by: Jenniffer Manning DNP, WENDY  Authorized by: Jenniffer Manning DNP, WENDY   Comparison: compared with previous ECG from 1/5/2021  Rhythm: atrial fibrillation  Conduction: left bundle branch block  Comments: No significant changes from previous EKG              Assessment:       Diagnosis Plan   1. Preop cardiovascular exam     2. Cardiomyopathy, unspecified type (CMS/HCC)  Stress Test With Pet Myocardial Perfusion (MULTI STUDY, REST AND STRESS)   3. DUCKWORTH (dyspnea on exertion)  Adult Transthoracic Echo Complete w/ Color, Spectral and Contrast if Necessary Per Protocol    Stress Test With Pet Myocardial Perfusion (MULTI STUDY, REST AND STRESS)   4. Atrial fibrillation, persistent (CMS/HCC)  Digoxin Level   5. LBBB (left bundle branch block)     6. Essential hypertension           Plan:     1. Preop cardiovascular exam: He is not able to complete greater than 4 METS.  He has multiple risk factors.  Would recommend Lexiscan PET stress test and repeat echocardiogram prior to clearing.  We discussed that given his weight and comorbidities he has increased risks with surgery but he would like to proceed.  He does report that he had a knee replacement in 2019 he did okay with this and had a shoulder surgery may be 6 to 12 months ago and did okay with this.  2. Left bundle branch block: Check stress test, as above.  If stress test normal highly recommended easing into a light walking routine and slowly increasing as tolerated and working on some healthy weight loss.  3. Persistent atrial fibrillation: Appears rate  controlled with carvedilol and digoxin.  Anticoagulated with warfarin.  We need to check a digoxin level.  He is already taken his medication today but will stop by the outpatient lab 1 day this week prior to taking medication to get the lab drawn to avoid falls elevated level.  4. Chronic anticoagulation: Anticoagulated with warfarin.  Denies falls or abnormal bleeding.  Follows with anticoagulation clinic.  5. Hypertension: Well controlled at home, running around 120 systolic.  Patient continue to monitor and call if staying greater than 130/80 on average.  6. Chronic kidney disease: Managed by outside provider  7. Hyperlipidemia: On statin therapy, managed by outside provider  8. Obstructive sleep apnea: On CPAP therapy  9. Morbid obesity    Patient to keep July follow-up with Dr. Luke scheduled or follow-up sooner if needed for any new, recurrent, or worsening symptoms or other issues or concerns.      Records reviewed including but not limited to 2/2020 echo, 6/2020 echo, 1/2021 EKG         Your medication list          Accurate as of April 21, 2021  9:44 AM. If you have any questions, ask your nurse or doctor.            CONTINUE taking these medications      Instructions Last Dose Given Next Dose Due   atorvastatin 40 MG tablet  Commonly known as: LIPITOR      Take 40 mg by mouth Every Night.       carvedilol 25 MG tablet  Commonly known as: COREG      Take 1 tablet by mouth 2 (Two) Times a Day With Meals.       digoxin 125 MCG tablet  Commonly known as: LANOXIN      TAKE 1 TABLET BY MOUTH DAILY       famotidine 40 MG tablet  Commonly known as: PEPCID      Take 1 tablet by mouth Daily.       HYDROcodone-acetaminophen  MG per tablet  Commonly known as: NORCO      TK 1 T PO  Q 12 H PRN P       lisinopril 5 MG tablet  Commonly known as: PRINIVIL,ZESTRIL      TAKE 1 TABLET BY MOUTH DAILY       Stimulant Laxative 8.6-50 MG per tablet  Generic drug: sennosides-docusate      Take 1 tablet by mouth  Daily.       warfarin 6 MG tablet  Commonly known as: COUMADIN      Take one and one-half tablets (9 mg) by mouth on Tues, and take one tablet (6 mg) by mouth all other days or as directed.              The above medication changes may not have been made by this provider.  Patient's medication list was updated to reflect medications they are currently taking including medication changes made by other providers.            Thanks,    Jenniffer Manning, JEROME, APRN  04/21/2021         Dictated utilizing Dragon dictation

## 2021-04-21 NOTE — PROGRESS NOTES
Anticoagulation Clinic Progress Note    Anticoagulation Summary  As of 2021    INR goal:  2.0-3.0   TTR:  63.0 % (1.2 y)   INR used for dosin.4 (2021)   Warfarin maintenance plan:  9 mg every Tue; 6 mg all other days   Weekly warfarin total:  45 mg   Plan last modified:  Rani Dhillon [facility] (2021)   Next INR check:  2021   Priority:  Maintenance   Target end date:  Indefinite    Indications    Atrial fibrillation with rapid ventricular response (CMS/HCC) [I48.91]  Anticoagulated on Coumadin [Z79.01]             Anticoagulation Episode Summary     INR check location:      Preferred lab:      Send INR reminders to:   [facility]ONAH [facility]    Comments:  New to warfarin 2/10; See  tele note for warfarin dosing hx. Afib + left atrial appendage thrombus at present; pending cardioversion upon resolution of CARRI thrombus.      Anticoagulation Care Providers     Provider Role Specialty Phone number    Gavin Luke MD Referring Cardiology [phone]          Clinic Interview:  Patient Findings     Positives:  Upcoming invasive procedure    Negatives:  Signs/symptoms of thrombosis, Signs/symptoms of bleeding,   Laboratory test error suspected, Change in health, Change in alcohol use,   Change in activity, Emergency department visit, Upcoming dental procedure,   Missed doses, Extra doses, Change in medications, Change in diet/appetite,   Hospital admission, Bruising, Other complaints    Comments:  Rotator cuff surgery 21, for which the surgeon is   requesting 5-day hold. Paperwork he brought to clinic indicates Dr. Luke provided clearance form already.       Clinical Outcomes     Negatives:  Major bleeding event, Thromboembolic event,   Anticoagulation-related hospital admission, Anticoagulation-related ED   visit, Anticoagulation-related fatality    Comments:  Rotator cuff surgery 21, for which the surgeon is   requesting 5-day hold. Paperwork he brought to  clinic indicates Dr. Luke provided clearance form already.         INR History:  Anticoagulation Monitoring 3/2/2021 3/30/2021 4/21/2021   INR 2.6 2.2 2.4   INR Date 3/2/2021 3/30/2021 4/21/2021   INR Goal 2.0-3.0 2.0-3.0 2.0-3.0   Trend Same Same Same   Last Week Total 45 mg 45 mg 45 mg   Next Week Total 45 mg 45 mg 45 mg   Sun 6 mg 6 mg Hold (5/9); Otherwise 6 mg   Mon 6 mg 6 mg Hold (5/10); Otherwise 6 mg   Tue 9 mg 9 mg Hold (5/11); Otherwise 9 mg   Wed 6 mg 6 mg Hold (5/12); Otherwise 6 mg   Thu 6 mg 6 mg 9 mg (5/13); Otherwise 6 mg   Fri 6 mg 6 mg 9 mg (5/14); Otherwise 6 mg   Sat 6 mg 6 mg Hold (5/8); Otherwise 6 mg   Visit Report - - -   Some recent data might be hidden       Plan:  1. INR is Therapeutic today- see above in Anticoagulation Summary.  Will instruct Milton Moody to Change their warfarin regimen- see above in Anticoagulation Summary.  2. Follow up 1.5 weeks following surgery  3. Patient declines warfarin refills.  4. Verbal and written information provided. Patient expresses understanding and has no further questions at this time.    Jimi Reid [facility]

## 2021-04-28 ENCOUNTER — TELEPHONE (OUTPATIENT)
Dept: CARDIOLOGY | Facility: CLINIC | Age: 56
End: 2021-04-28

## 2021-05-03 NOTE — TELEPHONE ENCOUNTER
5/3/21  Received fax surgery clearance from [facility] asking for surgery clearance - states it is scheduled for right shoulder/rotator cuff repair on 5/13/21.  The fax also included an ekg, labs.  Return fax is [phone]/fior - fax in y our IN box/fior

## 2021-05-03 NOTE — TELEPHONE ENCOUNTER
5/3/21  I called [facility] and spoke with Haydee.  She put me on hold ans asked - states they want him off warfarin for 10 days prior and ok to bridge with Lovenox./fior

## 2021-05-03 NOTE — TELEPHONE ENCOUNTER
Haydee from [facility] called again re: rotator cuff clearance. I have called her back and left a very detailed msg on her vm advising her that pt is awaiting further cardiac work up and clearance is still pending.     Additionally, procedure is scheduled on 5/13/2021 which is next Thurs. I have advised that we should have a response by 5/10/2021  Nigel, pt is also on anticoagulation and surgeon will need recommendations on holding ac prior.     Haydee   [phone]    Fax

## 2021-05-03 NOTE — TELEPHONE ENCOUNTER
Jessica-- please see message above and find out if they are needing warfarin held and if so for how long

## 2021-05-03 NOTE — TELEPHONE ENCOUNTER
Please find out how long they are needing him to hold the warfarin prior to surgery.  Still should have time to address this after stress test is done 5/6 even if they are wanting it to be held 5 days prior but would like to see results of stress test before clearing to do so and would like to find out orthopedics recommendations on how long it should be held first.

## 2021-05-04 NOTE — TELEPHONE ENCOUNTER
On INR clinic note they report that there was a paper from surgeon requesting that warfarin be held 5 days prior to procedure.  Would like to clarify with the office whether or not it needs to be held 5 or 10 days prior and either way double check that they are okay with a Lovenox bridge and then we can see if INR clinic can help arrange this.    Called and left voice message with Haydee with surgery scheduling asking her to call back and clarify.      Jessica--  Can you let me know when they call back and could you possibly clarify whether they really needed it held 10 days prior as this is not usually the norm or whether they need it held 5 day prior and double check that they are ok with bridge and let me know if they want me to talk to the surgeon if there are any concerns about this.

## 2021-05-04 NOTE — TELEPHONE ENCOUNTER
Spoke with Dr. Luke.  Ok to hold warfarin 10 days prior to procedure but we do recommend lovenox bridge.  Will see if INR clinic can help arrange.  Called to discuss with patient but no answer.  Left a voicemail asking him to call the office back.

## 2021-05-05 NOTE — TELEPHONE ENCOUNTER
5/5/21  [place] [facility] [place] asking if they want pt to hold warfarin for 5 days or 10 days and is it ok to bridge with lovenox/fior    I also called pt - informed him that we are waiting for a response from his surgeon - advised that he could possibly call them too and confirm the correct number of days to hold and let them know that we have called multiple times to confirm which directions to use - 5 or 10 days with lovenox/fior

## 2021-05-06 ENCOUNTER — HOSPITAL ENCOUNTER (OUTPATIENT)
Dept: CARDIOLOGY | Facility: HOSPITAL | Age: 56
Discharge: HOME | End: 2021-05-06

## 2021-05-06 ENCOUNTER — ANTICOAGULATION VISIT (OUTPATIENT)
Dept: PHARMACY | Facility: HOSPITAL | Age: 56
End: 2021-05-06

## 2021-05-06 VITALS
HEIGHT: 69 IN | BODY MASS INDEX: 46.65 KG/M2 | SYSTOLIC BLOOD PRESSURE: 150 MMHG | HEART RATE: 90 BPM | WEIGHT: 315 LBS | DIASTOLIC BLOOD PRESSURE: 100 MMHG | OXYGEN SATURATION: 98 %

## 2021-05-06 DIAGNOSIS — R06.09 DOE (DYSPNEA ON EXERTION): ICD-10-CM

## 2021-05-06 DIAGNOSIS — I42.9 CARDIOMYOPATHY, UNSPECIFIED TYPE (HCC): ICD-10-CM

## 2021-05-06 DIAGNOSIS — I48.91 ATRIAL FIBRILLATION WITH RAPID VENTRICULAR RESPONSE (HCC): Primary | ICD-10-CM

## 2021-05-06 DIAGNOSIS — Z79.01 ANTICOAGULATED ON COUMADIN: ICD-10-CM

## 2021-05-06 LAB
AORTIC ARCH: 3.7 CM
ASCENDING AORTA: 3.3 CM
BH CV ECHO MEAS - ACS: 1.7 CM
BH CV ECHO MEAS - AO MAX PG (FULL): 3.9 MMHG
BH CV ECHO MEAS - AO MAX PG: 6.2 MMHG
BH CV ECHO MEAS - AO MEAN PG (FULL): 3.4 MMHG
BH CV ECHO MEAS - AO MEAN PG: 4.6 MMHG
BH CV ECHO MEAS - AO ROOT AREA (BSA CORRECTED): 1.2
BH CV ECHO MEAS - AO ROOT AREA: 8.1 CM^2
BH CV ECHO MEAS - AO ROOT DIAM: 3.2 CM
BH CV ECHO MEAS - AO V2 MAX: 124.2 CM/SEC
BH CV ECHO MEAS - AO V2 MEAN: 104.2 CM/SEC
BH CV ECHO MEAS - AO V2 VTI: 24.9 CM
BH CV ECHO MEAS - ASC AORTA: 3.3 CM
BH CV ECHO MEAS - AVA(I,A): 1.5 CM^2
BH CV ECHO MEAS - AVA(I,D): 1.5 CM^2
BH CV ECHO MEAS - AVA(V,A): 1.9 CM^2
BH CV ECHO MEAS - AVA(V,D): 1.9 CM^2
BH CV ECHO MEAS - BSA(HAYCOCK): 2.9 M^2
BH CV ECHO MEAS - BSA: 2.7 M^2
BH CV ECHO MEAS - BZI_BMI: 54.2 KILOGRAMS/M^2
BH CV ECHO MEAS - BZI_METRIC_HEIGHT: 175.3 CM
BH CV ECHO MEAS - BZI_METRIC_WEIGHT: 166.5 KG
BH CV ECHO MEAS - EDV(MOD-SP2): 179 ML
BH CV ECHO MEAS - EDV(MOD-SP4): 177 ML
BH CV ECHO MEAS - EDV(TEICH): 165.3 ML
BH CV ECHO MEAS - EF(CUBED): 53.8 %
BH CV ECHO MEAS - EF(MOD-BP): 44 %
BH CV ECHO MEAS - EF(MOD-SP2): 40.2 %
BH CV ECHO MEAS - EF(MOD-SP4): 40.1 %
BH CV ECHO MEAS - EF(TEICH): 45 %
BH CV ECHO MEAS - ESV(MOD-SP2): 107 ML
BH CV ECHO MEAS - ESV(MOD-SP4): 106 ML
BH CV ECHO MEAS - ESV(TEICH): 91 ML
BH CV ECHO MEAS - FS: 22.7 %
BH CV ECHO MEAS - IVS/LVPW: 0.88
BH CV ECHO MEAS - IVSD: 1 CM
BH CV ECHO MEAS - LAT PEAK E' VEL: 9.8 CM/SEC
BH CV ECHO MEAS - LV DIASTOLIC VOL/BSA (35-75): 66.2 ML/M^2
BH CV ECHO MEAS - LV MASS(C)D: 260.6 GRAMS
BH CV ECHO MEAS - LV MASS(C)DI: 97.5 GRAMS/M^2
BH CV ECHO MEAS - LV MAX PG: 2.2 MMHG
BH CV ECHO MEAS - LV MEAN PG: 1.2 MMHG
BH CV ECHO MEAS - LV SYSTOLIC VOL/BSA (12-30): 39.6 ML/M^2
BH CV ECHO MEAS - LV V1 MAX: 74.9 CM/SEC
BH CV ECHO MEAS - LV V1 MEAN: 50.7 CM/SEC
BH CV ECHO MEAS - LV V1 VTI: 12.5 CM
BH CV ECHO MEAS - LVIDD: 5.8 CM
BH CV ECHO MEAS - LVIDS: 4.5 CM
BH CV ECHO MEAS - LVLD AP2: 8.2 CM
BH CV ECHO MEAS - LVLD AP4: 9.5 CM
BH CV ECHO MEAS - LVLS AP2: 8.2 CM
BH CV ECHO MEAS - LVLS AP4: 8.2 CM
BH CV ECHO MEAS - LVOT AREA (M): 3.1 CM^2
BH CV ECHO MEAS - LVOT AREA: 3.1 CM^2
BH CV ECHO MEAS - LVOT DIAM: 2 CM
BH CV ECHO MEAS - LVPWD: 1.2 CM
BH CV ECHO MEAS - MED PEAK E' VEL: 6.2 CM/SEC
BH CV ECHO MEAS - MV DEC SLOPE: 389.1 CM/SEC^2
BH CV ECHO MEAS - MV DEC TIME: 0.22 SEC
BH CV ECHO MEAS - MV E MAX VEL: 77.1 CM/SEC
BH CV ECHO MEAS - MV MAX PG: 2.5 MMHG
BH CV ECHO MEAS - MV MEAN PG: 1.4 MMHG
BH CV ECHO MEAS - MV P1/2T MAX VEL: 75.3 CM/SEC
BH CV ECHO MEAS - MV P1/2T: 56.7 MSEC
BH CV ECHO MEAS - MV V2 MAX: 79.4 CM/SEC
BH CV ECHO MEAS - MV V2 MEAN: 52.7 CM/SEC
BH CV ECHO MEAS - MV V2 VTI: 13.5 CM
BH CV ECHO MEAS - MVA P1/2T LCG: 2.9 CM^2
BH CV ECHO MEAS - MVA(P1/2T): 3.9 CM^2
BH CV ECHO MEAS - MVA(VTI): 2.8 CM^2
BH CV ECHO MEAS - PA ACC TIME: 0.1 SEC
BH CV ECHO MEAS - PA MAX PG (FULL): 1.1 MMHG
BH CV ECHO MEAS - PA MAX PG: 2.2 MMHG
BH CV ECHO MEAS - PA PR(ACCEL): 33.1 MMHG
BH CV ECHO MEAS - PA V2 MAX: 73.7 CM/SEC
BH CV ECHO MEAS - PULM DIAS VEL: 22.7 CM/SEC
BH CV ECHO MEAS - PULM S/D: 1
BH CV ECHO MEAS - PULM SYS VEL: 22.7 CM/SEC
BH CV ECHO MEAS - PVA(V,A): 2.9 CM^2
BH CV ECHO MEAS - PVA(V,D): 2.9 CM^2
BH CV ECHO MEAS - QP/QS: 1
BH CV ECHO MEAS - RV MAX PG: 1 MMHG
BH CV ECHO MEAS - RV MEAN PG: 0.67 MMHG
BH CV ECHO MEAS - RV V1 MAX: 51 CM/SEC
BH CV ECHO MEAS - RV V1 MEAN: 38.8 CM/SEC
BH CV ECHO MEAS - RV V1 VTI: 9.4 CM
BH CV ECHO MEAS - RVOT AREA: 4.3 CM^2
BH CV ECHO MEAS - RVOT DIAM: 2.3 CM
BH CV ECHO MEAS - SI(AO): 75.3 ML/M^2
BH CV ECHO MEAS - SI(CUBED): 38.9 ML/M^2
BH CV ECHO MEAS - SI(LVOT): 14.4 ML/M^2
BH CV ECHO MEAS - SI(MOD-SP2): 26.9 ML/M^2
BH CV ECHO MEAS - SI(MOD-SP4): 26.6 ML/M^2
BH CV ECHO MEAS - SI(TEICH): 27.8 ML/M^2
BH CV ECHO MEAS - SUP REN AO DIAM: 2 CM
BH CV ECHO MEAS - SV(AO): 201.4 ML
BH CV ECHO MEAS - SV(CUBED): 103.9 ML
BH CV ECHO MEAS - SV(LVOT): 38.5 ML
BH CV ECHO MEAS - SV(MOD-SP2): 72 ML
BH CV ECHO MEAS - SV(MOD-SP4): 71 ML
BH CV ECHO MEAS - SV(RVOT): 40.2 ML
BH CV ECHO MEAS - SV(TEICH): 74.3 ML
BH CV ECHO MEASUREMENTS AVERAGE E/E' RATIO: 9.64
BH CV VAS BP RIGHT ARM: NORMAL MMHG
BH CV XLRA - RV BASE: 2.3 CM
BH CV XLRA - RV LENGTH: 7.9 CM
BH CV XLRA - RV MID: 2.5 CM
BH CV XLRA - TDI S': 10.9 CM/SEC
INR PPP: 1.9 (ref 0.91–1.09)
LEFT ATRIUM VOLUME INDEX: 32 ML/M2
MAXIMAL PREDICTED HEART RATE: 166 BPM
PROTHROMBIN TIME: 22.3 SECONDS (ref 10–13.8)
SINUS: 3.3 CM
STJ: 3.2 CM
STRESS TARGET HR: 141 BPM

## 2021-05-06 PROCEDURE — 93306 TTE W/DOPPLER COMPLETE: CPT

## 2021-05-06 PROCEDURE — 85610 PROTHROMBIN TIME: CPT

## 2021-05-06 PROCEDURE — 25010000002 PERFLUTREN (DEFINITY) 8.476 MG IN SODIUM CHLORIDE (PF) 0.9 % 10 ML INJECTION: Performed by: NURSE PRACTITIONER

## 2021-05-06 PROCEDURE — 36416 COLLJ CAPILLARY BLOOD SPEC: CPT

## 2021-05-06 PROCEDURE — 0 TECHNETIUM TETROFOSMIN KIT: Performed by: NURSE PRACTITIONER

## 2021-05-06 PROCEDURE — 93017 CV STRESS TEST TRACING ONLY: CPT

## 2021-05-06 PROCEDURE — 93016 CV STRESS TEST SUPVJ ONLY: CPT | Performed by: INTERNAL MEDICINE

## 2021-05-06 PROCEDURE — 93018 CV STRESS TEST I&R ONLY: CPT | Performed by: INTERNAL MEDICINE

## 2021-05-06 PROCEDURE — 25010000002 REGADENOSON 0.4 MG/5ML SOLUTION: Performed by: NURSE PRACTITIONER

## 2021-05-06 PROCEDURE — 78452 HT MUSCLE IMAGE SPECT MULT: CPT

## 2021-05-06 PROCEDURE — G0463 HOSPITAL OUTPT CLINIC VISIT: HCPCS

## 2021-05-06 PROCEDURE — 93306 TTE W/DOPPLER COMPLETE: CPT | Performed by: INTERNAL MEDICINE

## 2021-05-06 PROCEDURE — 78452 HT MUSCLE IMAGE SPECT MULT: CPT | Performed by: INTERNAL MEDICINE

## 2021-05-06 PROCEDURE — A9502 TC99M TETROFOSMIN: HCPCS | Performed by: NURSE PRACTITIONER

## 2021-05-06 RX ADMIN — REGADENOSON 0.4 MG: 0.08 INJECTION, SOLUTION INTRAVENOUS at 10:35

## 2021-05-06 RX ADMIN — PERFLUTREN 1.5 ML: 6.52 INJECTION, SUSPENSION INTRAVENOUS at 09:37

## 2021-05-06 RX ADMIN — TETROFOSMIN 1 DOSE: 1.38 INJECTION, POWDER, LYOPHILIZED, FOR SOLUTION INTRAVENOUS at 10:35

## 2021-05-06 NOTE — PATIENT INSTRUCTIONS
5/06/21    HOLD warfarin; enoxaparin 150 mg every 12 hours beginning this evening  5/07/21    HOLD warfarin; enoxaparin 150 mg every 12 hours  5/08/21    HOLD warfarin; enoxaparin 150 mg every 12 hours  5/09/21    HOLD warfarin; enoxaparin 150 mg every 12 hours  5/10/21    HOLD warfarin; enoxaparin 150 mg every 12 hours  5/11/21    HOLD warfarin; enoxaparin 150 mg every 12 hours  5/12/21    HOLD warfarin; enoxaparin 150 mg in AM only (at least 24 hours prior to procedure)  5/13/21    PROCEDURE; warfarin 9 mg (if ok'd by surgeon to resume warfarin later that evening)  5/14/21    Warfarin 9 mg; enoxaparin 150 mg every 12 hours  5/15/21    Warfarin 9 mg; enoxaparin 150 mg every 12 hours  5/16/21    Warfarin 6 mg; enoxaparin 150 mg every 12 hours  5/17/21    Enoxaparin 150 mg in AM; INR CHECK 9:30 AM

## 2021-05-06 NOTE — TELEPHONE ENCOUNTER
Spoke with Mr. Moody by phone. He agreed to INR check this afternoon to help develop updated bridging plan.

## 2021-05-06 NOTE — TELEPHONE ENCOUNTER
Left  for Mr. Moody requesting return call. Will continue to attempt to reach him throughout the day to facilitate change in bridging plan. Thank you!

## 2021-05-06 NOTE — PROGRESS NOTES
Anticoagulation Clinic Progress Note    Anticoagulation Summary  As of 2021    INR goal:  2.0-3.0   TTR:  63.6 % (1.2 y)   INR used for dosin.9 (2021)   Warfarin maintenance plan:  9 mg every Tue; 6 mg all other days   Weekly warfarin total:  45 mg   Plan last modified:  Rani Dhillon [facility] (2021)   Next INR check:  2021   Priority:  Maintenance   Target end date:  Indefinite    Indications    Atrial fibrillation with rapid ventricular response (CMS/HCC) [I48.91]  Anticoagulated on Coumadin [Z79.01]             Anticoagulation Episode Summary     INR check location:      Preferred lab:      Send INR reminders to:   JONAH DIAMOND [facility]    Comments:  New to warfarin 2/10; See  tele note for warfarin dosing hx. Afib + left atrial appendage thrombus at present; pending cardioversion upon resolution of CARRI thrombus.      Anticoagulation Care Providers     Provider Role Specialty Phone number    Gavin Luke MD Referring Cardiology [phone]          Clinic Interview:  Patient Findings     Positives:  Upcoming invasive procedure, Missed doses    Negatives:  Signs/symptoms of thrombosis, Signs/symptoms of bleeding,   Laboratory test error suspected, Change in health, Change in alcohol use,   Change in activity, Emergency department visit, Upcoming dental procedure,   Extra doses, Change in medications, Change in diet/appetite, Hospital   admission, Bruising, Other complaints    Comments:  Surgeon newly requesting 10-day hold prior to procedure (see   21 tele note). Pt reports he started holding yesterday when he was   first informed by surgeon. Cardiology advising enoxaparin bridge for   extended period off warfarin. Pt confirms surgery will be an outpatient   procedure.       Clinical Outcomes     Negatives:  Major bleeding event, Thromboembolic event,   Anticoagulation-related hospital admission, Anticoagulation-related ED   visit, Anticoagulation-related fatality     Comments:  Surgeon newly requesting 10-day hold prior to procedure (see   4/28/21 tele note). Pt reports he started holding yesterday when he was   first informed by surgeon. Cardiology advising enoxaparin bridge for   extended period off warfarin. Pt confirms surgery will be an outpatient   procedure.         INR History:  Anticoagulation Monitoring 3/30/2021 4/21/2021 5/6/2021   INR 2.2 2.4 1.9   INR Date 3/30/2021 4/21/2021 5/6/2021   INR Goal 2.0-3.0 2.0-3.0 2.0-3.0   Trend Same Same Same   Last Week Total 45 mg 45 mg 39 mg   Next Week Total 45 mg 45 mg 0 mg   Sun 6 mg Hold (5/9); Otherwise 6 mg Hold (5/9); Otherwise 6 mg   Mon 6 mg Hold (5/10); Otherwise 6 mg Hold (5/10)   Tue 9 mg Hold (5/11); Otherwise 9 mg Hold (5/11)   Wed 6 mg Hold (5/12); Otherwise 6 mg Hold (5/12)   Thu 6 mg 9 mg (5/13); Otherwise 6 mg Hold (5/6), 9 mg (5/13)   Fri 6 mg 9 mg (5/14); Otherwise 6 mg Hold (5/7), 9 mg (5/14)   Sat 6 mg Hold (5/8); Otherwise 6 mg Hold (5/8), 9 mg (5/15)   Visit Report - - -   Some recent data might be hidden       Plan:  1. INR is Subtherapeutic today- see above in Anticoagulation Summary.  Will instruct Milton Moody to Change their warfarin regimen- see above in Anticoagulation Summary.  2. Administer enoxaparin 150 mg q12h as directed surrounding procedure (see pt instructions).   3. Follow up on 5/17/21  4. Patient declines warfarin refills.  5. Verbal and written information provided. Reviewed bridging plan and provided written calendar with instructions. Reviewed enoxaparin administration instructions; pt demonstrates confidence relating to the injections, as his wife uses insulin injections and his daughter is a nurse who can help. Patient expresses understanding and has no further questions at this time.    Jimi Reid [facility]

## 2021-05-06 NOTE — TELEPHONE ENCOUNTER
Jimi--- it turns out that patient needs to hold his warfarin 10 days prior to his surgery on the 13th.  He reports he did start holding his warfarin at the 10-day prior aquilino without notifying our office.  He does need a Lovenox bridge prior to surgery given history.  I know this is late notice but is there anyway you all can check an INR on him today and help get Lovenox bridge arranged based on INR results?

## 2021-05-06 NOTE — TELEPHONE ENCOUNTER
I spoke with Dr. Mervin Abreu's, RN myself.  He said yes, it is a policy that they hold warfarin 10 days prior but okay with Lovenox bridge.  I spoke with patient who says that he already started holding his warfarin starting 10 days prior to the procedure.  We will see if INR clinic might be able to recheck INR today and see if he needs Lovenox bridge starting today versus at a later date based on INR today.

## 2021-05-06 NOTE — TELEPHONE ENCOUNTER
5/6/21  vmsg from Brady with [facility], left [facility] - states he spoke with Jenniffer previously and told her their policy is to hold 10 days and ok to bridge with lovenox - he's not sure who told us 5 days.  His ph was [phone]/fior

## 2021-05-07 ENCOUNTER — TELEPHONE (OUTPATIENT)
Dept: CARDIOLOGY | Facility: CLINIC | Age: 56
End: 2021-05-07

## 2021-05-07 ENCOUNTER — HOSPITAL ENCOUNTER (OUTPATIENT)
Dept: CARDIOLOGY | Facility: HOSPITAL | Age: 56
Discharge: HOME | End: 2021-05-07

## 2021-05-07 VITALS — BODY MASS INDEX: 46.65 KG/M2 | HEIGHT: 69 IN | WEIGHT: 315 LBS

## 2021-05-07 DIAGNOSIS — I77.810 AORTIC ECTASIA, THORACIC (HCC): Primary | ICD-10-CM

## 2021-05-07 LAB
BH CV NUCLEAR PRIOR STUDY: 3
BH CV REST NUCLEAR ISOTOPE DOSE: 38.2 MCI
BH CV STRESS BP STAGE 1: NORMAL
BH CV STRESS COMMENTS STAGE 1: NORMAL
BH CV STRESS DOSE REGADENOSON STAGE 1: 0.4
BH CV STRESS DURATION MIN STAGE 1: 0
BH CV STRESS DURATION SEC STAGE 1: 10
BH CV STRESS HR STAGE 1: 83
BH CV STRESS NUCLEAR ISOTOPE DOSE: 35.8 MCI
BH CV STRESS PROTOCOL 1: NORMAL
BH CV STRESS RECOVERY BP: NORMAL MMHG
BH CV STRESS RECOVERY HR: 86 BPM
BH CV STRESS STAGE 1: 1
LV EF NUC BP: 21 %
MAXIMAL PREDICTED HEART RATE: 166 BPM
PERCENT MAX PREDICTED HR: 50 %
STRESS BASELINE BP: NORMAL MMHG
STRESS BASELINE HR: 70 BPM
STRESS PERCENT HR: 59 %
STRESS POST EXERCISE DUR SEC: 10 SEC
STRESS POST PEAK BP: NORMAL MMHG
STRESS POST PEAK HR: 83 BPM
STRESS TARGET HR: 141 BPM

## 2021-05-07 PROCEDURE — A9502 TC99M TETROFOSMIN: HCPCS | Performed by: NURSE PRACTITIONER

## 2021-05-07 PROCEDURE — 0 TECHNETIUM TETROFOSMIN KIT: Performed by: NURSE PRACTITIONER

## 2021-05-07 RX ADMIN — TETROFOSMIN 1 DOSE: 1.38 INJECTION, POWDER, LYOPHILIZED, FOR SOLUTION INTRAVENOUS at 13:13

## 2021-05-07 NOTE — TELEPHONE ENCOUNTER
Discussed stress test and echo results with Dr. Luke.  EF is actually improved a little bit with medical therapy.  Stress test without evidence of ischemia.  No evidence of left ventricular thrombus or mass.  We will continue medical therapy.  Patient without chest pain or discomfort symptoms.  He is at increased but nonprohibitive risk of proceeding with planned surgery.  Patient is well aware of his multiple risk factors but still wishes to proceed.  He is going to continue the beta-blocker perioperatively including the night before and morning of surgery.  He is already on Lovenox bridge and knows that his last dose will be on the day prior to surgery in the morning only.

## 2021-06-07 ENCOUNTER — ANTICOAGULATION VISIT (OUTPATIENT)
Dept: PHARMACY | Facility: HOSPITAL | Age: 56
End: 2021-06-07

## 2021-06-07 DIAGNOSIS — I48.91 ATRIAL FIBRILLATION WITH RAPID VENTRICULAR RESPONSE (HCC): Primary | ICD-10-CM

## 2021-06-07 DIAGNOSIS — Z79.01 ANTICOAGULATED ON COUMADIN: ICD-10-CM

## 2021-06-07 LAB
INR PPP: 2.3 (ref 0.91–1.09)
PROTHROMBIN TIME: 28 SECONDS (ref 10–13.8)

## 2021-06-07 PROCEDURE — 85610 PROTHROMBIN TIME: CPT

## 2021-06-07 PROCEDURE — 36416 COLLJ CAPILLARY BLOOD SPEC: CPT

## 2021-06-07 NOTE — PROGRESS NOTES
Anticoagulation Clinic Progress Note    Anticoagulation Summary  As of 2021    INR goal:  2.0-3.0   TTR:  64.4 % (1.3 y)   INR used for dosin.3 (2021)   Warfarin maintenance plan:  9 mg every Tue; 6 mg all other days   Weekly warfarin total:  45 mg   No change documented:  Marissa Joshi   Plan last modified:  Rani Dhillon [facility] (2021)   Next INR check:  2021   Priority:  Maintenance   Target end date:  Indefinite    Indications    Atrial fibrillation with rapid ventricular response (CMS/HCC) [I48.91]  Anticoagulated on Coumadin [Z79.01]             Anticoagulation Episode Summary     INR check location:      Preferred lab:      Send INR reminders to:   JONAH DIAMOND [facility] [place]    Comments:  New to warfarin 2/10; See  tele note for warfarin dosing hx. Afib + left atrial appendage thrombus at present; pending cardioversion upon resolution of CARRI thrombus.      Anticoagulation Care Providers     Provider Role Specialty Phone number    Gavin Luke MD Referring Cardiology [phone]          Clinic Interview:  Patient Findings     Negatives:  Signs/symptoms of thrombosis, Signs/symptoms of bleeding,   Laboratory test error suspected, Change in health, Change in alcohol use,   Change in activity, Upcoming invasive procedure, Emergency department   visit, Upcoming dental procedure, Missed doses, Extra doses, Change in   medications, Change in diet/appetite, Hospital admission, Bruising, Other   complaints      Clinical Outcomes     Negatives:  Major bleeding event, Thromboembolic event,   Anticoagulation-related hospital admission, Anticoagulation-related ED   visit, Anticoagulation-related fatality        INR History:  Anticoagulation Monitoring 2021   INR 2.4 1.9 2.3   INR Date 2021   INR Goal 2.0-3.0 2.0-3.0 2.0-3.0   Trend Same Same Same   Last Week Total 45 mg 39 mg 45 mg   Next Week Total 45 mg 0 mg 45 mg   Sun Hold ();  Otherwise 6 mg Hold (5/9); Otherwise 6 mg 6 mg   Mon Hold (5/10); Otherwise 6 mg Hold (5/10) 6 mg   Tue Hold (5/11); Otherwise 9 mg Hold (5/11) 9 mg   Wed Hold (5/12); Otherwise 6 mg Hold (5/12) 6 mg   Thu 9 mg (5/13); Otherwise 6 mg Hold (5/6), 9 mg (5/13) 6 mg   Fri 9 mg (5/14); Otherwise 6 mg Hold (5/7), 9 mg (5/14) 6 mg   Sat Hold (5/8); Otherwise 6 mg Hold (5/8), 9 mg (5/15) 6 mg   Visit Report - - -   Some recent data might be hidden       Plan:  1. INR is therapeutic today- see above in Anticoagulation Summary.   Will instruct Milton Moody to continue their warfarin regimen- see above in Anticoagulation Summary.  2. Follow up in 2 weeks.  3. Patient declines warfarin refills.  4. Verbal and written information provided. Patient expresses understanding and has no further questions at this time.    Marissa Joshi

## 2021-06-24 ENCOUNTER — TELEPHONE (OUTPATIENT)
Dept: CARDIOLOGY | Facility: CLINIC | Age: 56
End: 2021-06-24

## 2021-06-24 DIAGNOSIS — I77.810 AORTIC ECTASIA, THORACIC (HCC): Primary | ICD-10-CM

## 2021-06-24 NOTE — TELEPHONE ENCOUNTER
Patient did not show up for CT chest without contrast and is overdue for INR check.  Would have him keep upcoming appointment with INR clinic.  Please also see if he is amenable to doing the CT chest to find out whether or not he really has thoracic aneurysm starting, as previously discussed.  If so then I will place another order and we can get him rescheduled for this.

## 2021-06-24 NOTE — TELEPHONE ENCOUNTER
6/24/21  I spoke with patient - he forgot to get the ct - he does want you to reschedule it for him.  He has an appt to ck the pt/inr on the 29th and sees Dr. BUCKLEY on 7/27.  His ph [phone]/fior

## 2021-06-25 NOTE — TELEPHONE ENCOUNTER
New order placed.  Would have him call if he has not received a phone call to get this scheduled within the next week.

## 2021-06-28 ENCOUNTER — APPOINTMENT (OUTPATIENT)
Dept: PHARMACY | Facility: HOSPITAL | Age: 56
End: 2021-06-28

## 2021-06-29 ENCOUNTER — ANTICOAGULATION VISIT (OUTPATIENT)
Dept: PHARMACY | Facility: HOSPITAL | Age: 56
End: 2021-06-29

## 2021-06-29 DIAGNOSIS — I48.91 ATRIAL FIBRILLATION WITH RAPID VENTRICULAR RESPONSE (HCC): Primary | ICD-10-CM

## 2021-06-29 DIAGNOSIS — Z79.01 ANTICOAGULATED ON COUMADIN: ICD-10-CM

## 2021-06-29 LAB
INR PPP: 1.8 (ref 0.91–1.09)
PROTHROMBIN TIME: 21.6 SECONDS (ref 10–13.8)

## 2021-06-29 PROCEDURE — 36416 COLLJ CAPILLARY BLOOD SPEC: CPT

## 2021-06-29 PROCEDURE — 85610 PROTHROMBIN TIME: CPT

## 2021-06-29 PROCEDURE — G0463 HOSPITAL OUTPT CLINIC VISIT: HCPCS

## 2021-06-29 NOTE — PROGRESS NOTES
Anticoagulation Clinic Progress Note    Anticoagulation Summary  As of 2021    INR goal:  2.0-3.0   TTR:  64.2 % (1.3 y)   INR used for dosin.8 (2021)   Warfarin maintenance plan:  9 mg every Tue; 6 mg all other days   Weekly warfarin total:  45 mg   No change documented:  Ramakrishna Trevino III, PharmD   Plan last modified:  Rani Dhillon [facility] (2021)   Next INR check:  2021   Priority:  Maintenance   Target end date:  Indefinite    Indications    Atrial fibrillation with rapid ventricular response (CMS/HCC) [I48.91]  Anticoagulated on Coumadin [Z79.01]             Anticoagulation Episode Summary     INR check location:      Preferred lab:      Send INR reminders to:   JONAH DIAMOND [facility]    Comments:  New to warfarin 2/10; See  tele note for warfarin dosing hx. Afib + left atrial appendage thrombus at present; pending cardioversion upon resolution of CARRI thrombus.      Anticoagulation Care Providers     Provider Role Specialty Phone number    Gavin Luke MD Referring Cardiology [phone]          Clinic Interview:  Patient Findings     Positives:  Change in diet/appetite    Negatives:  Signs/symptoms of thrombosis, Signs/symptoms of bleeding,   Laboratory test error suspected, Change in health, Change in alcohol use,   Change in activity, Upcoming invasive procedure, Emergency department   visit, Upcoming dental procedure, Missed doses, Extra doses, Change in   medications, Hospital admission, Bruising, Other complaints    Comments:  Reports he had extra lovely slaw on Saturday, miracle whip   (contains soybean oil) dip this past week, and mixed greens salads the   past two days.       Clinical Outcomes     Negatives:  Major bleeding event, Thromboembolic event,   Anticoagulation-related hospital admission, Anticoagulation-related ED   visit, Anticoagulation-related fatality    Comments:  Reports he had extra lovely slaw on Saturday, miracle whip   (contains soybean  oil) dip this past week, and mixed greens salads the   past two days.         INR History:  Anticoagulation Monitoring 5/6/2021 6/7/2021 6/29/2021   INR 1.9 2.3 1.8   INR Date 5/6/2021 6/7/2021 6/29/2021   INR Goal 2.0-3.0 2.0-3.0 2.0-3.0   Trend Same Same Same   Last Week Total 39 mg 45 mg 45 mg   Next Week Total 0 mg 45 mg 45 mg   Sun Hold (5/9); Otherwise 6 mg 6 mg 6 mg   Mon Hold (5/10) 6 mg 6 mg   Tue Hold (5/11) 9 mg 9 mg   Wed Hold (5/12) 6 mg 6 mg   Thu Hold (5/6), 9 mg (5/13) 6 mg 6 mg   Fri Hold (5/7), 9 mg (5/14) 6 mg 6 mg   Sat Hold (5/8), 9 mg (5/15) 6 mg 6 mg   Visit Report - - -   Some recent data might be hidden       Plan:  1. INR is Subtherapeutic. Will instruct Milton Moody to continue their warfarin regimen - see above in Anticoagulation Summary.  2. Follow up in 3 weeks  3. Patient has been instructed to call if any changes in medications, doses, concerns, etc. Patient expresses understanding and has no further questions at this time.      Ramakrishna Trevino III, PharmD

## 2021-07-22 ENCOUNTER — ANTICOAGULATION VISIT (OUTPATIENT)
Dept: PHARMACY | Facility: HOSPITAL | Age: 56
End: 2021-07-22

## 2021-07-22 ENCOUNTER — TELEPHONE (OUTPATIENT)
Dept: CARDIOLOGY | Facility: CLINIC | Age: 56
End: 2021-07-22

## 2021-07-22 ENCOUNTER — HOSPITAL ENCOUNTER (OUTPATIENT)
Dept: CT IMAGING | Facility: HOSPITAL | Age: 56
Discharge: HOME | End: 2021-07-22
Admitting: NURSE PRACTITIONER

## 2021-07-22 DIAGNOSIS — Z79.01 ANTICOAGULATED ON COUMADIN: ICD-10-CM

## 2021-07-22 DIAGNOSIS — I77.810 AORTIC ECTASIA, THORACIC (HCC): ICD-10-CM

## 2021-07-22 DIAGNOSIS — I48.91 ATRIAL FIBRILLATION WITH RAPID VENTRICULAR RESPONSE (HCC): Primary | ICD-10-CM

## 2021-07-22 LAB
INR PPP: 2.1 (ref 0.91–1.09)
PROTHROMBIN TIME: 25.7 SECONDS (ref 10–13.8)

## 2021-07-22 PROCEDURE — 71250 CT THORAX DX C-: CPT

## 2021-07-22 PROCEDURE — 85610 PROTHROMBIN TIME: CPT

## 2021-07-22 PROCEDURE — 36416 COLLJ CAPILLARY BLOOD SPEC: CPT

## 2021-07-22 NOTE — PROGRESS NOTES
Anticoagulation Clinic Progress Note    Anticoagulation Summary  As of 2021    INR goal:  2.0-3.0   TTR:  62.8 % (1.4 y)   INR used for dosin.1 (2021)   Warfarin maintenance plan:  9 mg every Tue; 6 mg all other days   Weekly warfarin total:  45 mg   No change documented:  Marissa Joshi   Plan last modified:  Rani Dhillon [facility] (2021)   Next INR check:  2021   Priority:  Maintenance   Target end date:  Indefinite    Indications    Atrial fibrillation with rapid ventricular response (CMS/HCC) [I48.91]  Anticoagulated on Coumadin [Z79.01]             Anticoagulation Episode Summary     INR check location:      Preferred lab:      Send INR reminders to:   JONAH DIAMOND [facility] [place]    Comments:  New to warfarin 2/10; See  tele note for warfarin dosing hx. Afib + left atrial appendage thrombus at present; pending cardioversion upon resolution of CARRI thrombus.      Anticoagulation Care Providers     Provider Role Specialty Phone number    Gavin Luke MD Referring Cardiology [phone]          Clinic Interview:  Patient Findings     Negatives:  Signs/symptoms of thrombosis, Signs/symptoms of bleeding,   Laboratory test error suspected, Change in health, Change in alcohol use,   Change in activity, Upcoming invasive procedure, Emergency department   visit, Upcoming dental procedure, Missed doses, Extra doses, Change in   medications, Change in diet/appetite, Hospital admission, Bruising, Other   complaints      Clinical Outcomes     Negatives:  Major bleeding event, Thromboembolic event,   Anticoagulation-related hospital admission, Anticoagulation-related ED   visit, Anticoagulation-related fatality        INR History:  Anticoagulation Monitoring 2021   INR 2.3 1.8 2.1   INR Date 2021   INR Goal 2.0-3.0 2.0-3.0 2.0-3.0   Trend Same Same Same   Last Week Total 45 mg 45 mg 45 mg   Next Week Total 45 mg 45 mg 45 mg   Sun 6 mg 6  mg 6 mg   Mon 6 mg 6 mg 6 mg   Tue 9 mg 9 mg 9 mg   Wed 6 mg 6 mg 6 mg   Thu 6 mg 6 mg 6 mg   Fri 6 mg 6 mg 6 mg   Sat 6 mg 6 mg 6 mg   Visit Report - - -   Some recent data might be hidden       Plan:  1. INR is therapeutic today- see above in Anticoagulation Summary.   Will instruct Milton Moody to continue their warfarin regimen- see above in Anticoagulation Summary.  2. Follow up in 3 weeks.  3. Patient declines warfarin refills.  4. Verbal and written information provided. Patient expresses understanding and has no further questions at this time.    Marissa Joshi

## 2021-07-22 NOTE — TELEPHONE ENCOUNTER
CT chest shows no evidence of thoracic aortic aneurysm.  Does have a 7 mm nodule in the lingula and repeat imaging was recommended in 6 months to ensure stability.  Some mild interstitial septal thickening also seen mild interstitial edema versus mild fibrotic changes.  Patient denies any orthopnea or edema.  Chronic shortness of breath with exertion is unchanged.  Does not feel like he is holding onto extra fluid at this point.  Has an appointment next week and can discuss further and evaluate in person.  Also with what appears to be liver cyst.  He is going to discuss all noncardiac findings with PCP.  He reports his old PCP left and he does not know the name of his new one and he is going to call and find this out and let us know who to fax the report to.  Verbalized understanding of importance.

## 2021-07-22 NOTE — TELEPHONE ENCOUNTER
Heidy has an appt with us on tueday and this time he will bring/update his new pcp that you can send whatever you needed to send to pcp.

## 2021-07-23 NOTE — TELEPHONE ENCOUNTER
Okay thanks.  Dr. Luke, just KAI, was going to fax CT report to PCP so they could follow-up on noncardiac findings and need for additional imaging of lung nodule.  Patient did not know name of new PCP but will bring this to upcoming appointment with you if you do not mind to address at that time.

## 2021-07-27 ENCOUNTER — OFFICE VISIT (OUTPATIENT)
Dept: CARDIOLOGY | Facility: CLINIC | Age: 56
End: 2021-07-27

## 2021-07-27 ENCOUNTER — LAB (OUTPATIENT)
Dept: LAB | Facility: HOSPITAL | Age: 56
End: 2021-07-27

## 2021-07-27 VITALS
BODY MASS INDEX: 46.65 KG/M2 | HEART RATE: 103 BPM | HEIGHT: 69 IN | WEIGHT: 315 LBS | SYSTOLIC BLOOD PRESSURE: 128 MMHG | OXYGEN SATURATION: 98 % | DIASTOLIC BLOOD PRESSURE: 80 MMHG

## 2021-07-27 DIAGNOSIS — I48.19 ATRIAL FIBRILLATION, PERSISTENT (HCC): Primary | ICD-10-CM

## 2021-07-27 DIAGNOSIS — I48.19 ATRIAL FIBRILLATION, PERSISTENT (HCC): ICD-10-CM

## 2021-07-27 DIAGNOSIS — R91.1 PULMONARY NODULE: ICD-10-CM

## 2021-07-27 LAB
ANION GAP SERPL CALCULATED.3IONS-SCNC: 8.6 MMOL/L (ref 5–15)
BUN SERPL-MCNC: 12 MG/DL (ref 6–20)
BUN/CREAT SERPL: 9.6 (ref 7–25)
CALCIUM SPEC-SCNC: 9.5 MG/DL (ref 8.6–10.5)
CHLORIDE SERPL-SCNC: 106 MMOL/L (ref 98–107)
CO2 SERPL-SCNC: 26.4 MMOL/L (ref 22–29)
CREAT SERPL-MCNC: 1.25 MG/DL (ref 0.76–1.27)
DIGOXIN SERPL-MCNC: <0.3 NG/ML (ref 0.6–1.2)
GFR SERPL CREATININE-BSD FRML MDRD: 73 ML/MIN/1.73
GLUCOSE SERPL-MCNC: 104 MG/DL (ref 65–99)
POTASSIUM SERPL-SCNC: 4.1 MMOL/L (ref 3.5–5.2)
SODIUM SERPL-SCNC: 141 MMOL/L (ref 136–145)

## 2021-07-27 PROCEDURE — 36415 COLL VENOUS BLD VENIPUNCTURE: CPT

## 2021-07-27 PROCEDURE — 80162 ASSAY OF DIGOXIN TOTAL: CPT

## 2021-07-27 PROCEDURE — 99214 OFFICE O/P EST MOD 30 MIN: CPT | Performed by: INTERNAL MEDICINE

## 2021-07-27 PROCEDURE — 80048 BASIC METABOLIC PNL TOTAL CA: CPT

## 2021-07-27 PROCEDURE — 93000 ELECTROCARDIOGRAM COMPLETE: CPT | Performed by: INTERNAL MEDICINE

## 2021-07-27 NOTE — PROGRESS NOTES
PATIENTINFORMATION    Date of Office Visit: 2021  Encounter Provider: Gavin Luke MD  Place of Service: [facility] CARDIOLOGY  Patient Name: Milton Moody  : 1965    Subjective:     Encounter Date:2021      Patient ID: Milton Moody is a 55 y.o. male.    Chief Complaint   Patient presents with   • Atrial Fibrillation     3 months    • Cardiomyopathy   • Chronic Kidney Disease   • Sleep Apnea   • morbidly obese     HPI  Mr. Moody is a 55 years old man with past medical history of morbid obesity, persistent atrial fibrillation, nonischemic cardiomyopathy and CKD came to cardiology clinic for follow-up visit.  He had a shoulder surgery on the right side about a month ago has not been ambulating much.  He started walking few days ago.  He admits having some worsening shortness of breath because being inactive for a while and starting to walk now.  He denied any significant extremity swelling, orthopnea, PND.  He has gained some weight and he admits he has not been watching his diet.  But he reports being compliant with all his medications including Coumadin and also his CPAP machine.  Denied bleeding from any site.  No ER visit or hospitalization for heart failure exacerbation or A. fib.      ROS   All systems reviewed and negative except as noted in HPI.    Past Medical History:   Diagnosis Date   • Abnormal ECG    • Asthma    • Atrial fibrillation (CMS/HCC)    • Chest pain    • CKD (chronic kidney disease) stage 3, GFR 30-59 ml/min (CMS/HCC) 3/4/2020   • Hyperlipidemia    • Hypertension    • NICM (nonischemic cardiomyopathy) (CMS/HCC) 3/4/2020   • Nonischemic cardiomyopathy (CMS/HCC)    • Palpitations    • Rapid heart rate    • Sleep apnea        Past Surgical History:   Procedure Laterality Date   • KNEE SURGERY Left    • SHOULDER ROTATOR CUFF REPAIR Right 2020       Social History     Socioeconomic History   • Marital status:      Spouse name: Not on file   •  "Number of children: Not on file   • Years of education: Not on file   • Highest education level: Not on file   Tobacco Use   • Smoking status: Former Smoker     Packs/day: 0.10     Years: 3.00     Pack years: 0.30     Types: Cigarettes   • Smokeless tobacco: Former User     Types: Snuff   Vaping Use   • Vaping Use: Never assessed   Substance and Sexual Activity   • Alcohol use: Yes     Alcohol/week: 6.0 standard drinks     Types: 6 Cans of beer per week     Comment: weekend alcohol use   • Drug use: Not Currently     Types: Marijuana   • Sexual activity: Not Currently     Partners: Female       Family History   Problem Relation Age of Onset   • Hypertension Mother    • Hypertension Sister            ECG 12 Lead    Date/Time: 7/27/2021 8:28 AM  Performed by: Gavin Luke MD  Authorized by: Gavin Luke MD   Comparison: compared with previous ECG from 4/21/2021  Similar to previous ECG  Rhythm: atrial fibrillation  Rate: normal  Conduction: left bundle branch block  ST Segments: ST segments normal  T Waves: T waves normal  QRS axis: normal  Other: no other findings    Clinical impression: abnormal EKG               Objective:     /80   Pulse 103   Ht 175.3 cm (69\")   Wt (!) 170 kg (375 lb)   SpO2 98%   BMI 55.38 kg/m²  Body mass index is 55.38 kg/m².     Constitutional:       General: Not in acute distress.     Appearance: Well-developed. Morbidly obese. Not diaphoretic.   Eyes:      Pupils: Pupils are equal, round, and reactive to light.   HENT:      Head: Normocephalic and atraumatic.   Neck:      Thyroid: No thyromegaly.   Pulmonary:      Effort: Pulmonary effort is normal. No respiratory distress.      Breath sounds: Normal breath sounds. No wheezing. No rales.   Chest:      Chest wall: Not tender to palpatation.   Cardiovascular:      Normal rate. Irregularly irregular rhythm.      No gallop.   Pulses:     Intact distal pulses.   Edema:     Peripheral edema absent.   Abdominal:      " General: Bowel sounds are normal. There is no distension.      Palpations: Abdomen is soft.      Tenderness: There is no guarding.   Musculoskeletal: Normal range of motion.         General: No deformity.      Cervical back: Normal range of motion and neck supple. Skin:     General: Skin is warm and dry.      Findings: No rash.   Neurological:      Mental Status: Alert and oriented to person, place, and time.      Cranial Nerves: No cranial nerve deficit.      Deep Tendon Reflexes: Reflexes are normal and symmetric.   Psychiatric:         Judgment: Judgment normal.         Review Of Data:       Assessment/Plan:       1.  Persistent atrial fibrillation on chronic anticoagulation with Coumadin  -Last INR on 7/20/2021 is therapeutic  -His heart rate in the office on arrival is 103 bpm with EKG revealing old left bundle branch block.  Usually well controlled(has not taken her morning meds today)  -I will continue current care with Coreg, digoxin and Coumadin  -Check digoxin level today  2.  Likely nonischemic cardiomyopathy-not in overt heart failure  -Myocardial perfusion study on 5/7/2021 with no evidence for ischemia and left ventricular ejection fraction of 21%  -Echo in May 2021 with estimated left ventricular ejection fraction of 41-45%.  3.  Left bundle branch block-unchanged  4.  Hypertension-well-controlled on current regimen  5.  Morbid obesity with complications/obstructive sleep apnea on CPAP  -Encourage patient again to modify diet as much as possible and increase level of activity  6.  Hypercholesterolemia-on a statin  7.  Chronic kidney disease-check BMP today  8. Pulmonary nodule- will need fu CT of chest that I will refer patient to pulmoanry    I will see patient back in about a month to check on his breathing status.  May repeat another echocardiogram with persistent shortness of breath    Diagnosis and plan of care discussed with patient and verbalized understanding.           Gavin Luke,  MD  07/27/21  09:11 EDT

## 2021-08-12 ENCOUNTER — ANTICOAGULATION VISIT (OUTPATIENT)
Dept: PHARMACY | Facility: HOSPITAL | Age: 56
End: 2021-08-12

## 2021-08-12 DIAGNOSIS — I48.91 ATRIAL FIBRILLATION WITH RAPID VENTRICULAR RESPONSE (HCC): Primary | ICD-10-CM

## 2021-08-12 DIAGNOSIS — Z79.01 ANTICOAGULATED ON COUMADIN: ICD-10-CM

## 2021-08-12 LAB
INR PPP: 1.8 (ref 0.91–1.09)
PROTHROMBIN TIME: 21.6 SECONDS (ref 10–13.8)

## 2021-08-12 PROCEDURE — 36416 COLLJ CAPILLARY BLOOD SPEC: CPT

## 2021-08-12 PROCEDURE — G0463 HOSPITAL OUTPT CLINIC VISIT: HCPCS

## 2021-08-12 PROCEDURE — 85610 PROTHROMBIN TIME: CPT

## 2021-08-12 RX ORDER — WARFARIN SODIUM 6 MG/1
TABLET ORAL
Qty: 100 TABLET | Refills: 1 | Status: SHIPPED | OUTPATIENT
Start: 2021-08-12 | End: 2021-09-13 | Stop reason: SDUPTHER

## 2021-08-12 NOTE — PROGRESS NOTES
Anticoagulation Clinic Progress Note    Anticoagulation Summary  As of 2021    INR goal:  2.0-3.0   TTR:  61.6 % (1.5 y)   INR used for dosin.8 (2021)   Warfarin maintenance plan:  9 mg every Tue; 6 mg all other days   Weekly warfarin total:  45 mg   Plan last modified:  Rani Dhillon [facility] (2021)   Next INR check:  2021   Priority:  Maintenance   Target end date:  Indefinite    Indications    Atrial fibrillation with rapid ventricular response (CMS/HCC) [I48.91]  Anticoagulated on Coumadin [Z79.01]             Anticoagulation Episode Summary     INR check location:      Preferred lab:      Send INR reminders to:   JONAH DIAMOND [facility]    Comments:  New to warfarin 2/10; See  tele note for warfarin dosing hx. Afib + left atrial appendage thrombus at present; pending cardioversion upon resolution of CARRI thrombus.      Anticoagulation Care Providers     Provider Role Specialty Phone number    Gavin Luke MD Referring Cardiology [phone]          Clinic Interview:  Patient Findings     Positives:  Missed doses, Change in diet/appetite    Negatives:  Signs/symptoms of thrombosis, Signs/symptoms of bleeding,   Laboratory test error suspected, Change in health, Change in alcohol use,   Change in activity, Upcoming invasive procedure, Emergency department   visit, Upcoming dental procedure, Extra doses, Change in medications,   Hospital admission, Bruising, Other complaints    Comments:  Possibly missed a dose on Saturday or  but patient is   not confident either way.   Patient reports having spinach  and Tuesday this week.        Clinical Outcomes     Negatives:  Major bleeding event, Thromboembolic event,   Anticoagulation-related hospital admission, Anticoagulation-related ED   visit, Anticoagulation-related fatality    Comments:  Possibly missed a dose on Saturday or  but patient is   not confident either way.   Patient reports having spinach   and Tuesday this week.          INR History:  Anticoagulation Monitoring 6/29/2021 7/22/2021 8/12/2021   INR 1.8 2.1 1.8   INR Date 6/29/2021 7/22/2021 8/12/2021   INR Goal 2.0-3.0 2.0-3.0 2.0-3.0   Trend Same Same Same   Last Week Total 45 mg 45 mg 39 mg   Next Week Total 45 mg 45 mg 48 mg   Sun 6 mg 6 mg 6 mg   Mon 6 mg 6 mg 6 mg   Tue 9 mg 9 mg 9 mg   Wed 6 mg 6 mg 6 mg   Thu 6 mg 6 mg 9 mg (8/12)   Fri 6 mg 6 mg 6 mg   Sat 6 mg 6 mg 6 mg   Visit Report - - -   Some recent data might be hidden       Plan:  1. INR is Subtherapeutic today- see above in Anticoagulation Summary.  Will instruct Milton Moody to Change their warfarin regimen- see above in Anticoagulation Summary.  2. Follow up in 1 week  3. Patient desires warfarin refills.  4. Verbal and written information provided. Patient expresses understanding and has no further questions at this time.    Kelly Christy, PharmD

## 2021-08-19 ENCOUNTER — APPOINTMENT (OUTPATIENT)
Dept: PHARMACY | Facility: HOSPITAL | Age: 56
End: 2021-08-19

## 2021-08-20 ENCOUNTER — ANTICOAGULATION VISIT (OUTPATIENT)
Dept: PHARMACY | Facility: HOSPITAL | Age: 56
End: 2021-08-20

## 2021-08-20 DIAGNOSIS — Z79.01 ANTICOAGULATED ON COUMADIN: ICD-10-CM

## 2021-08-20 DIAGNOSIS — I48.91 ATRIAL FIBRILLATION WITH RAPID VENTRICULAR RESPONSE (HCC): Primary | ICD-10-CM

## 2021-08-20 LAB
INR PPP: 3 (ref 0.91–1.09)
PROTHROMBIN TIME: 35.5 SECONDS (ref 10–13.8)

## 2021-08-20 PROCEDURE — 36416 COLLJ CAPILLARY BLOOD SPEC: CPT

## 2021-08-20 PROCEDURE — 85610 PROTHROMBIN TIME: CPT

## 2021-08-20 NOTE — PROGRESS NOTES
Anticoagulation Clinic Progress Note    Anticoagulation Summary  As of 8/20/2021    INR goal:  2.0-3.0   TTR:  61.9 % (1.5 y)   INR used for dosing:  3.0 (8/20/2021)   Warfarin maintenance plan:  9 mg every Tue; 6 mg all other days   Weekly warfarin total:  45 mg   No change documented:  Rochelle Meyers   Plan last modified:  Rani Dhillon, [facility] (1/5/2021)   Next INR check:  9/3/2021   Priority:  Maintenance   Target end date:  Indefinite    Indications    Atrial fibrillation with rapid ventricular response (CMS/HCC) [I48.91]  Anticoagulated on Coumadin [Z79.01]             Anticoagulation Episode Summary     INR check location:      Preferred lab:      [facility]    Comments:  New to warfarin 2/10; See 2/18 tele note for warfarin dosing hx. Afib + left atrial appendage thrombus at present; pending cardioversion upon resolution of CARRI thrombus.      Anticoagulation Care Providers     Provider Role Specialty Phone number    Gavin Luke MD Referring Cardiology [phone]          Clinic Interview:  Patient Findings     Positives:  Change in activity        INR History:  Anticoagulation Monitoring 7/22/2021 8/12/2021 8/20/2021   INR 2.1 1.8 3.0   INR Date 7/22/2021 8/12/2021 8/20/2021   INR Goal 2.0-3.0 2.0-3.0 2.0-3.0   Trend Same Same Same   Last Week Total 45 mg 39 mg 45 mg   Next Week Total 45 mg 48 mg 45 mg   Sun 6 mg 6 mg 6 mg   Mon 6 mg 6 mg 6 mg   Tue 9 mg 9 mg 9 mg   Wed 6 mg 6 mg 6 mg   Thu 6 mg 9 mg (8/12) 6 mg   Fri 6 mg 6 mg 6 mg   Sat 6 mg 6 mg 6 mg   Visit Report - - -   Some recent data might be hidden       Plan:  1. INR is therapeutic today- see above in Anticoagulation Summary.   Will instruct Milton Moody to continue their warfarin regimen- see above in Anticoagulation Summary.  2. Follow up in 2 weeks.  3. Patient declines warfarin refills.  4. Verbal and written information provided. Patient expresses understanding and has no  further questions at this time.    Rochelle Meyers

## 2021-09-03 ENCOUNTER — ANTICOAGULATION VISIT (OUTPATIENT)
Dept: PHARMACY | Facility: HOSPITAL | Age: 56
End: 2021-09-03

## 2021-09-03 DIAGNOSIS — I48.91 ATRIAL FIBRILLATION WITH RAPID VENTRICULAR RESPONSE (HCC): Primary | ICD-10-CM

## 2021-09-03 DIAGNOSIS — Z79.01 ANTICOAGULATED ON COUMADIN: ICD-10-CM

## 2021-09-03 LAB
INR PPP: 3 (ref 0.91–1.09)
PROTHROMBIN TIME: 35.9 SECONDS (ref 10–13.8)

## 2021-09-03 PROCEDURE — 36416 COLLJ CAPILLARY BLOOD SPEC: CPT

## 2021-09-03 PROCEDURE — 85610 PROTHROMBIN TIME: CPT

## 2021-09-03 NOTE — PROGRESS NOTES
Anticoagulation Clinic Progress Note    Anticoagulation Summary  As of 9/3/2021    INR goal:  2.0-3.0   TTR:  62.9 % (1.5 y)   INR used for dosing:  3.0 (9/3/2021)   Warfarin maintenance plan:  9 mg every Tue; 6 mg all other days   Weekly warfarin total:  45 mg   No change documented:  Jeanie Briceno   Plan last modified:  aRni Dhillon, [facility] (1/5/2021)   Next INR check:  9/10/2021   Priority:  High   Target end date:  Indefinite    Indications    Atrial fibrillation with rapid ventricular response (CMS/HCC) [I48.91]  Anticoagulated on Coumadin [Z79.01]             Anticoagulation Episode Summary     INR check location:      Preferred lab:      [facility]    Comments:  New to warfarin 2/10; See 2/18 tele note for warfarin dosing hx. Afib + left atrial appendage thrombus at present; pending cardioversion upon resolution of CARRI thrombus.      Anticoagulation Care Providers     Provider Role Specialty Phone number    Gavin Luke MD Referring Cardiology [phone]          Clinic Interview:  Patient Findings     Positives:  Change in medications    Comments:  Pt reported starting prednisone 8/27 for 2 weeks. Pt also   started Supplements Pure Form Cleanse and Pure Form Keto Wednesday.      Clinical Outcomes     Comments:  Pt reported starting prednisone 8/27 for 2 weeks. Pt also   started Supplements Pure Form Cleanse and Pure Form Keto Wednesday.        INR History:  Anticoagulation Monitoring 8/12/2021 8/20/2021 9/3/2021   INR 1.8 3.0 3.0   INR Date 8/12/2021 8/20/2021 9/3/2021   INR Goal 2.0-3.0 2.0-3.0 2.0-3.0   Trend Same Same Same   Last Week Total 39 mg 45 mg 45 mg   Next Week Total 48 mg 45 mg 45 mg   Sun 6 mg 6 mg 6 mg   Mon 6 mg 6 mg 6 mg   Tue 9 mg 9 mg 9 mg   Wed 6 mg 6 mg 6 mg   Thu 9 mg (8/12) 6 mg 6 mg   Fri 6 mg 6 mg 6 mg   Sat 6 mg 6 mg 6 mg   Visit Report - - -   Some recent data might be hidden       Plan:  1. INR is therapeutic today- see  above in Anticoagulation Summary.   Will instruct Milton Moody to continue their warfarin regimen- see above in Anticoagulation Summary.  2. Follow up in 1 weeks  3. Patient declines warfarin refills.  4. Verbal and written information provided. Patient expresses understanding and has no further questions at this time.    Jeanie Briceno

## 2021-09-14 RX ORDER — WARFARIN SODIUM 6 MG/1
TABLET ORAL
Qty: 100 TABLET | Refills: 1 | Status: SHIPPED | OUTPATIENT
Start: 2021-09-14 | End: 2022-01-19 | Stop reason: SDUPTHER

## 2021-10-03 NOTE — PROGRESS NOTES
PATIENTINFORMATION    Date of Office Visit: 2021  Encounter Provider: Gavin Luke MD  Place of Service: [facility] CARDIOLOGY  Patient Name: Milton Mooyd  : 1965    Subjective:     Encounter Date:2021      Patient ID: Milton Moody is a 55 y.o. male.    Chief Complaint   Patient presents with   • Atrial Fibrillation     6 months follow up   • Hypertension   • Shortness of Breath   • Congestive Heart Failure     HPI  Mr. Moody is a 55 years old man with past medical history of persistent atrial fibrillation, tachycardia induced cardiomyopathy, left atrial appendage thrombus, super morbid obesity, hypertension, and on chronic anticoagulation came to cardiology clinic for follow-up visit.  He denied any significant worsening of shortness of breath, orthopnea, PND or extremity swelling since last visit and has been working without any significant symptoms.  He tries to exercise up to 3 times a week walking a couple of blocks and denied significant symptoms other than right knee pain with repeated walking.  Denied bleeding from any sites and reports being compliant with all his medications at home and his blood pressure usually runs with systolic in the 120s and diastolic in the 70s and 80s at home and heart rate mostly in the 60s and 70s.   He admits not being very compliant with diet and he believes he has gained  back the weight he lost over the previous several months.  He is compliant with his CPAP machine at home    ROS   All systems reviewed and negative except as noted in HPI.    Past Medical History:   Diagnosis Date   • Abnormal ECG    • Asthma    • Atrial fibrillation (CMS/HCC)    • Chest pain    • CKD (chronic kidney disease) stage 3, GFR 30-59 ml/min 3/4/2020   • Hyperlipidemia    • Hypertension    • NICM (nonischemic cardiomyopathy) (CMS/HCC) 3/4/2020   • Nonischemic cardiomyopathy (CMS/HCC)    • Palpitations    • Rapid heart rate    • Sleep apnea   "      Past Surgical History:   Procedure Laterality Date   • KNEE SURGERY Left 2019   • SHOULDER ROTATOR CUFF REPAIR Right 01/2020       Social History     Socioeconomic History   • Marital status:      Spouse name: Not on file   • Number of children: Not on file   • Years of education: Not on file   • Highest education level: Not on file   Tobacco Use   • Smoking status: Former Smoker     Packs/day: 0.10     Years: 3.00     Pack years: 0.30     Types: Cigarettes   • Smokeless tobacco: Former User     Types: Snuff   Substance and Sexual Activity   • Alcohol use: Yes     Alcohol/week: 6.0 standard drinks     Types: 6 Cans of beer per week     Frequency: 2-4 times a month     Drinks per session: 10 or more     Binge frequency: Monthly     Comment: weekend alcohol use   • Drug use: Not Currently     Types: Marijuana   • Sexual activity: Not Currently     Partners: Female       Family History   Problem Relation Age of Onset   • Hypertension Mother    • Hypertension Sister            ECG 12 Lead    Date/Time: 1/5/2021 11:55 AM  Performed by: Gavin Luke MD  Authorized by: Gavin Luke MD   Comparison: compared with previous ECG from 6/10/2020  Rhythm: atrial fibrillation  Rate: normal  Conduction: non-specific intraventricular conduction delay  ST Segments: ST segments normal  T Waves: T waves normal  QRS axis: normal  Other: no other findings    Clinical impression: abnormal EKG               Objective:     /90   Pulse 106   Ht 175.3 cm (69\")   Wt (!) 162 kg (357 lb)   SpO2 99%   BMI 52.72 kg/m²  Body mass index is 52.72 kg/m².     Constitutional:       General: Not in acute distress.     Appearance: Well-developed. Morbidly obese. Not diaphoretic.   Eyes:      Pupils: Pupils are equal, round, and reactive to light.   HENT:      Head: Normocephalic and atraumatic.   Neck:      Musculoskeletal: Normal range of motion and neck supple.      Thyroid: No thyromegaly.   Pulmonary:      " Effort: Pulmonary effort is normal. No respiratory distress.      Breath sounds: Normal breath sounds. No wheezing. No rales.   Chest:      Chest wall: Not tender to palpatation.   Cardiovascular:      Tachycardia present. Irregularly irregular rhythm.      No gallop.   Pulses:     Intact distal pulses.   Edema:     Peripheral edema absent.   Abdominal:      General: Bowel sounds are normal. There is no distension.      Palpations: Abdomen is soft.      Tenderness: There is no guarding.   Musculoskeletal: Normal range of motion.         General: No deformity.   Skin:     General: Skin is warm and dry.      Findings: No rash.   Neurological:      Mental Status: Alert and oriented to person, place, and time.      Cranial Nerves: No cranial nerve deficit.      Deep Tendon Reflexes: Reflexes are normal and symmetric.   Psychiatric:         Judgment: Judgment normal.         Review Of Data:       Assessment/Plan:           NICM (nonischemic cardiomyopathy) (CMS/HCC)    Essential hypertension-controlled    Atrial fibrillation, persistent-rate fairly controlled even if his heart rate was 106 on EKG done today immediately after arriving in the office.    Mixed hyperlipidemia-on statin    Morbidly obese (CMS/HCC)    Anticoagulated on Coumadin    Obstructive sleep apnea of adult    CKD (chronic kidney disease), stage II     Patient look clinically compensated with no overt heart failure.  Heart rate and blood pressure seems to be fairly controlled at home.  No bleeding from any site.  Last INR today is subtherapeutic but patient reported being compliant.  Most of patient's problem are probably tied to his weight and I will refer him to bariatric surgery    Return to clinic in 6 months and consider repeating echocardiogram then.  Repeat BMP and CBC today.      Diagnosis and plan of care discussed with patient and verbalized understanding.           Gavin Luke MD  01/05/21  12:10 EST     Offered and patient accepted

## 2021-10-07 ENCOUNTER — ANTICOAGULATION VISIT (OUTPATIENT)
Dept: PHARMACY | Facility: HOSPITAL | Age: 56
End: 2021-10-07

## 2021-10-07 DIAGNOSIS — I48.91 ATRIAL FIBRILLATION WITH RAPID VENTRICULAR RESPONSE (HCC): Primary | ICD-10-CM

## 2021-10-07 DIAGNOSIS — Z79.01 ANTICOAGULATED ON COUMADIN: ICD-10-CM

## 2021-10-07 LAB
INR PPP: 2.4 (ref 0.91–1.09)
PROTHROMBIN TIME: 29.3 SECONDS (ref 10–13.8)

## 2021-10-07 PROCEDURE — 36416 COLLJ CAPILLARY BLOOD SPEC: CPT

## 2021-10-07 PROCEDURE — 85610 PROTHROMBIN TIME: CPT

## 2021-10-07 NOTE — PROGRESS NOTES
Anticoagulation Clinic Progress Note    Anticoagulation Summary  As of 10/7/2021    INR goal:  2.0-3.0   TTR:  65.0 % (1.6 y)   INR used for dosin.4 (10/7/2021)   Warfarin maintenance plan:  9 mg every Tue; 6 mg all other days   Weekly warfarin total:  45 mg   Plan last modified:  Rani Dhillon [facility] (2021)   Next INR check:  2021   Priority:  High   Target end date:  Indefinite    Indications    Atrial fibrillation with rapid ventricular response (HCC) [I48.91]  Anticoagulated on Coumadin [Z79.01]             Anticoagulation Episode Summary     INR check location:      Preferred lab:      Send INR reminders to:   JONAH DIAMOND [facility]    Comments:  New to warfarin 2/10; See  tele note for warfarin dosing hx. Afib + left atrial appendage thrombus at present; pending cardioversion upon resolution of CARRI thrombus.      Anticoagulation Care Providers     Provider Role Specialty Phone number    Gavin Luke MD Referring Cardiology [phone]          Clinic Interview:  Patient Findings     Negatives:  Signs/symptoms of thrombosis, Signs/symptoms of bleeding,   Laboratory test error suspected, Change in health, Change in alcohol use,   Change in activity, Upcoming invasive procedure, Emergency department   visit, Upcoming dental procedure, Missed doses, Extra doses, Change in   medications, Change in diet/appetite, Hospital admission, Bruising, Other   complaints      Clinical Outcomes     Negatives:  Major bleeding event, Thromboembolic event,   Anticoagulation-related hospital admission, Anticoagulation-related ED   visit, Anticoagulation-related fatality        INR History:  Anticoagulation Monitoring 2021 9/3/2021 10/7/2021   INR 3.0 3.0 2.4   INR Date 2021 9/3/2021 10/7/2021   INR Goal 2.0-3.0 2.0-3.0 2.0-3.0   Trend Same Same Same   Last Week Total 45 mg 45 mg 45 mg   Next Week Total 45 mg 45 mg 45 mg   Sun 6 mg 6 mg 6 mg   Mon 6 mg 6 mg 6 mg   Tue 9 mg 9 mg 9 mg    Wed 6 mg 6 mg 6 mg   Thu 6 mg 6 mg 6 mg   Fri 6 mg 6 mg 6 mg   Sat 6 mg 6 mg 6 mg   Visit Report - - -   Some recent data might be hidden       Plan:  1. INR is Therapeutic today- see above in Anticoagulation Summary.  Will instruct Milton Moody to Continue their warfarin regimen- see above in Anticoagulation Summary.  2. Follow up in 4 weeks  3. Patient declines and is undecided about warfarin refills-pt unsure whether has refills, will check and refill if needed.  4. Verbal and written information provided. Patient expresses understanding and has no further questions at this time.    Yosi Pineda, PharmD  Pharmacy Resident

## 2021-10-08 ENCOUNTER — LAB (OUTPATIENT)
Dept: LAB | Facility: HOSPITAL | Age: 56
End: 2021-10-08

## 2021-10-08 ENCOUNTER — OFFICE VISIT (OUTPATIENT)
Dept: CARDIOLOGY | Facility: CLINIC | Age: 56
End: 2021-10-08

## 2021-10-08 VITALS
BODY MASS INDEX: 46.65 KG/M2 | HEIGHT: 69 IN | DIASTOLIC BLOOD PRESSURE: 92 MMHG | SYSTOLIC BLOOD PRESSURE: 162 MMHG | HEART RATE: 93 BPM | WEIGHT: 315 LBS

## 2021-10-08 DIAGNOSIS — I48.19 ATRIAL FIBRILLATION, PERSISTENT (HCC): ICD-10-CM

## 2021-10-08 DIAGNOSIS — I48.19 ATRIAL FIBRILLATION, PERSISTENT (HCC): Primary | ICD-10-CM

## 2021-10-08 LAB
ANION GAP SERPL CALCULATED.3IONS-SCNC: 11 MMOL/L (ref 5–15)
BUN SERPL-MCNC: 13 MG/DL (ref 6–20)
BUN/CREAT SERPL: 10.3 (ref 7–25)
CALCIUM SPEC-SCNC: 9.2 MG/DL (ref 8.6–10.5)
CHLORIDE SERPL-SCNC: 104 MMOL/L (ref 98–107)
CO2 SERPL-SCNC: 25 MMOL/L (ref 22–29)
CREAT SERPL-MCNC: 1.26 MG/DL (ref 0.76–1.27)
DIGOXIN SERPL-MCNC: 0.3 NG/ML (ref 0.6–1.2)
GFR SERPL CREATININE-BSD FRML MDRD: 72 ML/MIN/1.73
GLUCOSE SERPL-MCNC: 117 MG/DL (ref 65–99)
POTASSIUM SERPL-SCNC: 4.5 MMOL/L (ref 3.5–5.2)
SODIUM SERPL-SCNC: 140 MMOL/L (ref 136–145)

## 2021-10-08 PROCEDURE — 80048 BASIC METABOLIC PNL TOTAL CA: CPT

## 2021-10-08 PROCEDURE — 93000 ELECTROCARDIOGRAM COMPLETE: CPT | Performed by: INTERNAL MEDICINE

## 2021-10-08 PROCEDURE — 80162 ASSAY OF DIGOXIN TOTAL: CPT

## 2021-10-08 PROCEDURE — 99214 OFFICE O/P EST MOD 30 MIN: CPT | Performed by: INTERNAL MEDICINE

## 2021-10-08 PROCEDURE — 36415 COLL VENOUS BLD VENIPUNCTURE: CPT

## 2021-10-08 RX ORDER — DIGOXIN 250 MCG
250 TABLET ORAL DAILY
Qty: 90 TABLET | Refills: 3 | Status: SHIPPED | OUTPATIENT
Start: 2021-10-08 | End: 2022-10-23

## 2021-10-08 RX ORDER — LISINOPRIL 10 MG/1
10 TABLET ORAL DAILY
Qty: 90 TABLET | Refills: 3
Start: 2021-10-08 | End: 2023-01-25 | Stop reason: ALTCHOICE

## 2021-10-08 NOTE — PROGRESS NOTES
Patient notified of results and recommendations and verbalized understanding  Malinda Palacios RN  Triage nurse

## 2021-10-08 NOTE — PROGRESS NOTES
PATIENTINFORMATION    Date of Office Visit: 10/08/2021  Encounter Provider: Gavin Luke MD  Place of Service: [facility] CARDIOLOGY  Patient Name: Milton Moody  : 1965    Subjective:     Encounter Date:10/08/2021      Patient ID: Milton Moody is a 56 y.o. male.    Chief Complaint   Patient presents with   • Atrial Fibrillation   • LBBB   • Cardiomyopathy     HPI  Mr. Moody is a 56 years old man with past medical history of persistent atrial fibrillation, nonischemic cardiomyopathy, morbid obese/sleep apnea and CKD came to cardiology clinic for follow-up visit.  During the last visit in 2021 he was reporting some worsening of shortness of breath but today reports improved breathing and he has actually increased his level of activity and trying to walk more.  No significant weight changes since last visit.  He reports being compliant with all current treatment regimen and checks his blood pressure intermittently and reports systolic running in the 120s and 130s.  He denies any presyncope or syncope, orthopnea, PND, palpitations or significant lower extremity swelling.  Denied any significant complications or bleeding on Coumadin and most recent INR is therapeutic      ROS   All systems reviewed and negative except as noted in HPI.    Past Medical History:   Diagnosis Date   • Abnormal ECG    • Asthma    • Atrial fibrillation (HCC)    • Chest pain    • CKD (chronic kidney disease) stage 3, GFR 30-59 ml/min ([facility]) 3/4/2020   • Hyperlipidemia    • Hypertension    • NICM (nonischemic cardiomyopathy) ([facility]) 3/4/2020   • Nonischemic cardiomyopathy (HCC)    • Palpitations    • Rapid heart rate    • Sleep apnea        Past Surgical History:   Procedure Laterality Date   • KNEE SURGERY Left    • SHOULDER ROTATOR CUFF REPAIR Right 2020       Social History     Socioeconomic History   • Marital status:      Spouse name: Not on file   • Number of children: Not on file   • Years of  "education: Not on file   • Highest education level: Not on file   Tobacco Use   • Smoking status: Former Smoker     Packs/day: 0.10     Years: 3.00     Pack years: 0.30     Types: Cigarettes   • Smokeless tobacco: Former User     Types: Snuff   Vaping Use   • Vaping Use: Never assessed   Substance and Sexual Activity   • Alcohol use: Yes     Alcohol/week: 6.0 standard drinks     Types: 6 Cans of beer per week     Comment: weekend alcohol use   • Drug use: Not Currently     Types: Marijuana   • Sexual activity: Not Currently     Partners: Female       Family History   Problem Relation Age of Onset   • Hypertension Mother    • Hypertension Sister            ECG 12 Lead    Date/Time: 10/8/2021 8:25 AM  Performed by: Gavin Luke MD  Authorized by: Gavin Luke MD   Comparison: compared with previous ECG from 7/27/2021  Similar to previous ECG  Rhythm: atrial fibrillation  Rate: normal  Conduction: left bundle branch block  ST Segments: ST segments normal  T Waves: T waves normal  QRS axis: normal  Other: no other findings    Clinical impression: normal ECG               Objective:     /92 (BP Location: Left arm)   Pulse 93   Ht 175.3 cm (69\")   Wt (!) 171 kg (376 lb)   BMI 55.53 kg/m²  Body mass index is 55.53 kg/m².     Constitutional:       General: Not in acute distress.     Appearance: Morbidly obese. Not diaphoretic.   Eyes:      Pupils: Pupils are equal, round, and reactive to light.   HENT:      Head: Normocephalic and atraumatic.   Neck:      Thyroid: No thyromegaly.   Pulmonary:      Effort: Pulmonary effort is normal. No respiratory distress.      Breath sounds: Normal breath sounds. No wheezing. No rales.   Chest:      Chest wall: Not tender to palpatation.   Cardiovascular:      Normal rate. Irregularly irregular rhythm.      No gallop.   Pulses:     Intact distal pulses.   Edema:     Peripheral edema absent.   Abdominal:      General: Bowel sounds are normal. There is no " distension.      Palpations: Abdomen is soft.      Tenderness: There is no guarding.   Musculoskeletal: Normal range of motion.         General: No deformity.      Cervical back: Normal range of motion and neck supple. Skin:     General: Skin is warm and dry.      Findings: No rash.   Neurological:      Mental Status: Alert and oriented to person, place, and time.      Cranial Nerves: No cranial nerve deficit.      Deep Tendon Reflexes: Reflexes are normal and symmetric.   Psychiatric:         Judgment: Judgment normal.         Review Of Data:       Assessment/Plan:       1.  Persistent atrial fibrillation on chronic anticoagulation with Coumadin  -Post recent INR therapeutic  -Heart rate today in the office is 93 bpm-he has not taken his beta-blocker yet  -Continue current care with Coreg 25 mg p.o. twice daily, digoxin and Coumadin  -Check digoxin level today  2.  Non ischemic cardiomyopathy-not in overt heart failure  -Myocardial perfusion study on 5/7/2021 with no evidence for ischemia and left ventricular ejection fraction of 21%  -Echo in May 2021 with estimated left ventricular ejection fraction of 41-45%.  3.  Left bundle branch block-unchanged  4.  Hypertension-well-BP today on the higher side.-Increase lisinopril to 10 mg p.o. daily  5.  Morbid obesity with complications/obstructive sleep apnea on CPAP  -Encourage patient again to modify diet as much as possible and increase level of activity  6.  Hypercholesterolemia-on a statin  7.  Chronic kidney disease-level creatinine  8. Pulmonary nodule-along With pulmonary      Return to clinic in 6 months or sooner with any concerning symptoms.  Diagnosis and plan of care discussed with patient and verbalized understanding.           Gavin Luke MD  10/08/21  09:08 EDT

## 2021-10-08 NOTE — PROGRESS NOTES
Can you please notify patient to double up on dose of digoxin: 2 pills instead of one OR I have called in for 250 mcg to his pharmacy and he can stop taking this one take one of the new one. He needs to go to lab this coming Thursday for blood work in the morning before he takes med( take digoxin after blood work) Thank you

## 2021-11-02 ENCOUNTER — TRANSCRIBE ORDERS (OUTPATIENT)
Dept: ADMINISTRATIVE | Facility: HOSPITAL | Age: 56
End: 2021-11-02

## 2021-11-02 DIAGNOSIS — R91.1 LUNG NODULE: Primary | ICD-10-CM

## 2021-11-23 ENCOUNTER — ANTICOAGULATION VISIT (OUTPATIENT)
Dept: PHARMACY | Facility: HOSPITAL | Age: 56
End: 2021-11-23

## 2021-11-23 DIAGNOSIS — I48.91 ATRIAL FIBRILLATION WITH RAPID VENTRICULAR RESPONSE (HCC): Primary | ICD-10-CM

## 2021-11-23 DIAGNOSIS — Z79.01 ANTICOAGULATED ON COUMADIN: ICD-10-CM

## 2021-11-23 LAB
INR PPP: 2 (ref 0.91–1.09)
PROTHROMBIN TIME: 23.9 SECONDS (ref 10–13.8)

## 2021-11-23 PROCEDURE — 85610 PROTHROMBIN TIME: CPT

## 2021-11-23 PROCEDURE — 36416 COLLJ CAPILLARY BLOOD SPEC: CPT

## 2021-11-23 NOTE — PROGRESS NOTES
Anticoagulation Clinic Progress Note    Anticoagulation Summary  As of 2021    INR goal:  2.0-3.0   TTR:  67.6 % (1.7 y)   INR used for dosin.0 (2021)   Warfarin maintenance plan:  9 mg every Tue; 6 mg all other days   Weekly warfarin total:  45 mg   No change documented:  Marissa Joshi   Plan last modified:  Rani Dhillon [facility] (2021)   Next INR check:  2021   Priority:  High   Target end date:  Indefinite    Indications    Atrial fibrillation with rapid ventricular response (HCC) [I48.91]  Anticoagulated on Coumadin [Z79.01]             Anticoagulation Episode Summary     INR check location:      Preferred lab:      Send INR reminders to:   JONAH DIAMOND [facility]    Comments:  New to warfarin 2/10; See  tele note for warfarin dosing hx. Afib + left atrial appendage thrombus at present; pending cardioversion upon resolution of CARRI thrombus.      Anticoagulation Care Providers     Provider Role Specialty Phone number    Gavin Luke MD Referring Cardiology [phone]          Clinic Interview:  Patient Findings     Negatives:  Signs/symptoms of thrombosis, Signs/symptoms of bleeding,   Laboratory test error suspected, Change in health, Change in alcohol use,   Change in activity, Upcoming invasive procedure, Emergency department   visit, Upcoming dental procedure, Missed doses, Extra doses, Change in   medications, Change in diet/appetite, Hospital admission, Bruising, Other   complaints      Clinical Outcomes     Negatives:  Major bleeding event, Thromboembolic event,   Anticoagulation-related hospital admission, Anticoagulation-related ED   visit, Anticoagulation-related fatality        INR History:  Anticoagulation Monitoring 9/3/2021 10/7/2021 2021   INR 3.0 2.4 2.0   INR Date 9/3/2021 10/7/2021 2021   INR Goal 2.0-3.0 2.0-3.0 2.0-3.0   Trend Same Same Same   Last Week Total 45 mg 45 mg 45 mg   Next Week Total 45 mg 45 mg 45 mg   Sun 6 mg 6 mg 6  mg   Mon 6 mg 6 mg 6 mg   Tue 9 mg 9 mg 9 mg   Wed 6 mg 6 mg 6 mg   Thu 6 mg 6 mg 6 mg   Fri 6 mg 6 mg 6 mg   Sat 6 mg 6 mg 6 mg   Visit Report - - -   Some recent data might be hidden       Plan:  1. INR is therapeutic today- see above in Anticoagulation Summary.   Will instruct Milton Moody to continue their warfarin regimen- see above in Anticoagulation Summary.  2. Follow up in 4 weeks.  3. Patient declines warfarin refills.  4. Verbal and written information provided. Patient expresses understanding and has no further questions at this time.    Marissa Joshi

## 2022-01-19 RX ORDER — WARFARIN SODIUM 6 MG/1
TABLET ORAL
Qty: 8 TABLET | Refills: 0 | Status: SHIPPED | OUTPATIENT
Start: 2022-01-19 | End: 2022-03-21 | Stop reason: SDUPTHER

## 2022-01-19 NOTE — TELEPHONE ENCOUNTER
Sending in 7 day supply of warfarin until appointment in clinic on Monday to determine INR result

## 2022-02-14 ENCOUNTER — TELEPHONE (OUTPATIENT)
Dept: PHARMACY | Facility: HOSPITAL | Age: 57
End: 2022-02-14

## 2022-02-15 ENCOUNTER — TELEPHONE (OUTPATIENT)
Dept: PHARMACY | Facility: HOSPITAL | Age: 57
End: 2022-02-15

## 2022-02-15 RX ORDER — LISINOPRIL 5 MG/1
5 TABLET ORAL
Qty: 30 TABLET | Refills: 11 | OUTPATIENT
Start: 2022-02-15

## 2022-02-15 NOTE — TELEPHONE ENCOUNTER
Last OV 10/8/21.  Next OV 4/8/22. Labs 10/8/21.  Does not meet protocol.  Please advise.    JUSTIN CASTILLO

## 2022-02-23 RX ORDER — CARVEDILOL 25 MG/1
TABLET ORAL
Qty: 180 TABLET | Refills: 0 | Status: SHIPPED | OUTPATIENT
Start: 2022-02-23 | End: 2022-04-29

## 2022-02-23 NOTE — TELEPHONE ENCOUNTER
Last OV 10/08/21.  Next OV 4/8/22.  Labs 10/08/21.  [facility] JONATHAN

## 2022-03-06 ENCOUNTER — HOSPITAL ENCOUNTER (OUTPATIENT)
Dept: CT IMAGING | Facility: HOSPITAL | Age: 57
Discharge: HOME | End: 2022-03-06
Admitting: INTERNAL MEDICINE

## 2022-03-06 DIAGNOSIS — R91.1 LUNG NODULE: ICD-10-CM

## 2022-03-06 PROCEDURE — 71250 CT THORAX DX C-: CPT

## 2022-03-21 ENCOUNTER — ANTICOAGULATION VISIT (OUTPATIENT)
Dept: PHARMACY | Facility: HOSPITAL | Age: 57
End: 2022-03-21

## 2022-03-21 DIAGNOSIS — Z79.01 ANTICOAGULATED ON COUMADIN: ICD-10-CM

## 2022-03-21 DIAGNOSIS — I48.91 ATRIAL FIBRILLATION WITH RAPID VENTRICULAR RESPONSE: Primary | ICD-10-CM

## 2022-03-21 LAB
INR PPP: 1.6 (ref 0.91–1.09)
PROTHROMBIN TIME: 19 SECONDS (ref 10–13.8)

## 2022-03-21 PROCEDURE — 36416 COLLJ CAPILLARY BLOOD SPEC: CPT

## 2022-03-21 PROCEDURE — G0463 HOSPITAL OUTPT CLINIC VISIT: HCPCS

## 2022-03-21 PROCEDURE — 85610 PROTHROMBIN TIME: CPT

## 2022-03-21 RX ORDER — WARFARIN SODIUM 6 MG/1
TABLET ORAL
Qty: 35 TABLET | Refills: 0 | Status: SHIPPED | OUTPATIENT
Start: 2022-03-21 | End: 2022-04-26 | Stop reason: SDUPTHER

## 2022-03-21 NOTE — PATIENT INSTRUCTIONS
Take booster dose today of 9mg, then continue current regimen. Follow up in week for INR check and to discuss warfarin hold necessary for scheduled 4/13 surgery

## 2022-03-21 NOTE — PROGRESS NOTES
Anticoagulation Clinic Progress Note    Anticoagulation Summary  As of 3/21/2022    INR goal:  2.0-3.0   TTR:  57.0 % (2.1 y)   INR used for dosin.6 (3/21/2022)   Warfarin maintenance plan:  9 mg every Tue; 6 mg all other days   Weekly warfarin total:  45 mg   Plan last modified:  Rani Dhillon, [facility] (2021)   Next INR check:  2022   Priority:  High   Target end date:  Indefinite    Indications    Atrial fibrillation with rapid ventricular response (HCC) [I48.91]  Anticoagulated on Coumadin [Z79.01]             Anticoagulation Episode Summary     INR check location:      Preferred lab:      Send INR reminders to:  [facility]    Comments:  New to warfarin 2/10; See  tele note for warfarin dosing hx. Afib + left atrial appendage thrombus at present; pending cardioversion upon resolution of CARRI thrombus.      Anticoagulation Care Providers     Provider Role Specialty Phone number    Gavin Luke MD Referring Cardiology [phone]          Clinic Interview:      INR History:  Anticoagulation Monitoring 10/7/2021 2021 3/21/2022   INR 2.4 2.0 1.6   INR Date 10/7/2021 2021 3/21/2022   INR Goal 2.0-3.0 2.0-3.0 2.0-3.0   Trend Same Same Same   Last Week Total 45 mg 45 mg 39 mg   Next Week Total 45 mg 45 mg 48 mg   Sun 6 mg 6 mg 6 mg   Mon 6 mg 6 mg 9 mg (3/21); Otherwise 6 mg   Tue 9 mg 9 mg 9 mg   Wed 6 mg 6 mg 6 mg   Thu 6 mg 6 mg 6 mg   Fri 6 mg 6 mg 6 mg   Sat 6 mg 6 mg 6 mg   Visit Report - - -   Some recent data might be hidden       Plan:  1. INR is Subtherapeutic today- see above in Anticoagulation Summary.   NOTE carpal tunnel surgery scheduled for ,Dr Bai [phone]   Missed 3/15 dose, took extra 3mg on 3/16. Pt to take 9mg booster today, then cont curr regimen see above in Anticoagulation Summary.  2. Follow up in 1 week  3. Patient desires warfarin refills.  4. Verbal and written information provided. Patient expresses understanding and has no  further questions at this time.    Lauren Rodriges, [facility]

## 2022-03-28 ENCOUNTER — ANTICOAGULATION VISIT (OUTPATIENT)
Dept: PHARMACY | Facility: HOSPITAL | Age: 57
End: 2022-03-28

## 2022-03-28 DIAGNOSIS — Z79.01 ANTICOAGULATED ON COUMADIN: ICD-10-CM

## 2022-03-28 DIAGNOSIS — I48.91 ATRIAL FIBRILLATION WITH RAPID VENTRICULAR RESPONSE: Primary | ICD-10-CM

## 2022-03-28 LAB
INR PPP: 1.9 (ref 0.91–1.09)
PROTHROMBIN TIME: 22.7 SECONDS (ref 10–13.8)

## 2022-03-28 PROCEDURE — G0463 HOSPITAL OUTPT CLINIC VISIT: HCPCS

## 2022-03-28 PROCEDURE — 85610 PROTHROMBIN TIME: CPT

## 2022-03-28 PROCEDURE — 36416 COLLJ CAPILLARY BLOOD SPEC: CPT

## 2022-03-28 NOTE — PROGRESS NOTES
Anticoagulation Clinic Progress Note    Anticoagulation Summary  As of 3/28/2022    INR goal:  2.0-3.0   TTR:  56.5 % (2.1 y)   INR used for dosin.9 (3/28/2022)   Warfarin maintenance plan:  9 mg every Tue; 6 mg all other days   Weekly warfarin total:  45 mg   Plan last modified:  Rani Dhillon [facility] (2021)   Next INR check:  2022   Priority:  High   Target end date:  Indefinite    Indications    Atrial fibrillation with rapid ventricular response (HCC) [I48.91]  Anticoagulated on Coumadin [Z79.01]             Anticoagulation Episode Summary     INR check location:      Preferred lab:      Send INR reminders to:   JOANH [facility]    Comments:  New to warfarin 2/10; See  tele note for warfarin dosing hx. Afib + left atrial appendage thrombus at present; pending cardioversion upon resolution of CARRI thrombus.      Anticoagulation Care Providers     Provider Role Specialty Phone number    Gavin Luke MD Referring Cardiology [phone]          Clinic Interview:  Patient Findings     Positives:  Change in alcohol use, Upcoming invasive procedure, Change in   diet/appetite    Negatives:  Signs/symptoms of thrombosis, Signs/symptoms of bleeding,   Laboratory test error suspected, Change in health, Change in activity,   Emergency department visit, Upcoming dental procedure, Missed doses, Extra   doses, Change in medications, Hospital admission, Bruising, Other   complaints    Comments:  Reports more EtOH recently while watching college basketball   tournament; he does not expect his to continue beyond this coming week.   Reports drinking keto shakes over the past week; however, he finished   these 2 days ago and does not plan to re-purchase. Reports he ate kale a   few days ago. Carpal tunnel surgery 22, for which Cardiology has   cleared him to hold warfarin 3 days prior.       Clinical Outcomes     Negatives:  Major bleeding event, Thromboembolic event,    Anticoagulation-related hospital admission, Anticoagulation-related ED   visit, Anticoagulation-related fatality    Comments:  Reports more EtOH recently while watching college basketball   tournament; he does not expect his to continue beyond this coming week.   Reports drinking keto shakes over the past week; however, he finished   these 2 days ago and does not plan to re-purchase. Reports he ate kale a   few days ago. Carpal tunnel surgery 4/13/22, for which Cardiology has   cleared him to hold warfarin 3 days prior.         INR History:  Anticoagulation Monitoring 11/23/2021 3/21/2022 3/28/2022   INR 2.0 1.6 1.9   INR Date 11/23/2021 3/21/2022 3/28/2022   INR Goal 2.0-3.0 2.0-3.0 2.0-3.0   Trend Same Same Same   Last Week Total 45 mg 39 mg 48 mg   Next Week Total 45 mg 48 mg 48 mg   Sun 6 mg 6 mg Hold (4/10); Otherwise 6 mg   Mon 6 mg 9 mg (3/21); Otherwise 6 mg 9 mg (3/28), Hold (4/11); Otherwise 6 mg   Tue 9 mg 9 mg Hold (4/12); Otherwise 9 mg   Wed 6 mg 6 mg 9 mg (4/13); Otherwise 6 mg   Thu 6 mg 6 mg 9 mg (4/14); Otherwise 6 mg   Fri 6 mg 6 mg 6 mg   Sat 6 mg 6 mg 6 mg   Visit Report - - -   Some recent data might be hidden       Plan:  1. INR is Subtherapeutic today- see above in Anticoagulation Summary.  Will instruct Milton Moody to Change their warfarin regimen- see above in Anticoagulation Summary.  2. Follow up 4/22/22 (1 week following procedure).   3. Patient declines warfarin refills.  4. Verbal and written information provided. Patient expresses understanding and has no further questions at this time.    Jimi Reid, PharmD

## 2022-03-29 ENCOUNTER — TRANSCRIBE ORDERS (OUTPATIENT)
Dept: ADMINISTRATIVE | Facility: HOSPITAL | Age: 57
End: 2022-03-29

## 2022-03-29 DIAGNOSIS — R91.1 PULMONARY NODULE: ICD-10-CM

## 2022-03-29 DIAGNOSIS — K76.9 LIVER LESION: Primary | ICD-10-CM

## 2022-04-21 ENCOUNTER — APPOINTMENT (OUTPATIENT)
Dept: MRI IMAGING | Facility: HOSPITAL | Age: 57
End: 2022-04-21

## 2022-04-22 ENCOUNTER — APPOINTMENT (OUTPATIENT)
Dept: PHARMACY | Facility: HOSPITAL | Age: 57
End: 2022-04-22

## 2022-04-26 ENCOUNTER — ANTICOAGULATION VISIT (OUTPATIENT)
Dept: PHARMACY | Facility: HOSPITAL | Age: 57
End: 2022-04-26

## 2022-04-26 DIAGNOSIS — I48.91 ATRIAL FIBRILLATION WITH RAPID VENTRICULAR RESPONSE: Primary | ICD-10-CM

## 2022-04-26 DIAGNOSIS — Z79.01 ANTICOAGULATED ON COUMADIN: ICD-10-CM

## 2022-04-26 LAB
INR PPP: 2.1 (ref 0.91–1.09)
PROTHROMBIN TIME: 25.3 SECONDS (ref 10–13.8)

## 2022-04-26 PROCEDURE — 85610 PROTHROMBIN TIME: CPT

## 2022-04-26 PROCEDURE — 36416 COLLJ CAPILLARY BLOOD SPEC: CPT

## 2022-04-26 RX ORDER — WARFARIN SODIUM 6 MG/1
TABLET ORAL
Qty: 35 TABLET | Refills: 0 | Status: SHIPPED | OUTPATIENT
Start: 2022-04-26 | End: 2022-05-18 | Stop reason: SDUPTHER

## 2022-04-26 NOTE — PROGRESS NOTES
Anticoagulation Clinic Progress Note    Anticoagulation Summary  As of 2022    INR goal:  2.0-3.0   TTR:  56.3 % (2.2 y)   INR used for dosin.1 (2022)   Warfarin maintenance plan:  9 mg every Tue; 6 mg all other days   Weekly warfarin total:  45 mg   No change documented:  Tejal Couch, Pharmacy Intern   Plan last modified:  Rani Dhillon [facility] (2021)   Next INR check:  5/10/2022   Priority:  High   Target end date:  Indefinite    Indications    Atrial fibrillation with rapid ventricular response (HCC) [I48.91]  Anticoagulated on Coumadin [Z79.01]             Anticoagulation Episode Summary     INR check location:      Preferred lab:      Send INR reminders to:   JONAH DIAMOND [facility]    Comments:  New to warfarin 2/10; See  tele note for warfarin dosing hx. Afib + left atrial appendage thrombus at present; pending cardioversion upon resolution of CARRI thrombus.      Anticoagulation Care Providers     Provider Role Specialty Phone number    Gavin Luke MD Referring Cardiology [phone]          Clinic Interview:  Patient Findings     Positives:  Change in medications    Negatives:  Signs/symptoms of thrombosis, Signs/symptoms of bleeding,   Laboratory test error suspected, Change in health, Change in alcohol use,   Change in activity, Upcoming invasive procedure, Emergency department   visit, Upcoming dental procedure, Missed doses, Extra doses, Change in   diet/appetite, Hospital admission, Bruising, Other complaints    Comments:  Carpal tunnel surgery on . Reports taking ~1 norco per   day.       Clinical Outcomes     Negatives:  Major bleeding event, Thromboembolic event,   Anticoagulation-related hospital admission, Anticoagulation-related ED   visit, Anticoagulation-related fatality    Comments:  Carpal tunnel surgery on . Reports taking ~1 norco per   day.         INR History:  Anticoagulation Monitoring 3/21/2022 3/28/2022 2022   INR 1.6 1.9 2.1   INR  Date 3/21/2022 3/28/2022 4/26/2022   INR Goal 2.0-3.0 2.0-3.0 2.0-3.0   Trend Same Same Same   Last Week Total 39 mg 48 mg 45 mg   Next Week Total 48 mg 48 mg 45 mg   Sun 6 mg Hold (4/10); Otherwise 6 mg 6 mg   Mon 9 mg (3/21); Otherwise 6 mg 9 mg (3/28), Hold (4/11); Otherwise 6 mg 6 mg   Tue 9 mg Hold (4/12); Otherwise 9 mg 9 mg   Wed 6 mg 9 mg (4/13); Otherwise 6 mg 6 mg   Thu 6 mg 9 mg (4/14); Otherwise 6 mg 6 mg   Fri 6 mg 6 mg 6 mg   Sat 6 mg 6 mg 6 mg   Visit Report - - -   Some recent data might be hidden       Plan:  1. INR is Therapeutic today- see above in Anticoagulation Summary.  Will instruct Milton Moody to Continue their warfarin regimen- see above in Anticoagulation Summary.  2. Follow up in 2 weeks  3. Patient desires warfarin refills.  4. Verbal and written information provided. Patient expresses understanding and has no further questions at this time.    Tejal Couch, Pharmacy Intern

## 2022-04-29 RX ORDER — CARVEDILOL 25 MG/1
TABLET ORAL
Qty: 180 TABLET | Refills: 1 | Status: SHIPPED | OUTPATIENT
Start: 2022-04-29 | End: 2023-01-25

## 2022-05-17 ENCOUNTER — TELEPHONE (OUTPATIENT)
Dept: PHARMACY | Facility: HOSPITAL | Age: 57
End: 2022-05-17

## 2022-05-18 ENCOUNTER — ANTICOAGULATION VISIT (OUTPATIENT)
Dept: PHARMACY | Facility: HOSPITAL | Age: 57
End: 2022-05-18

## 2022-05-18 DIAGNOSIS — I48.91 ATRIAL FIBRILLATION WITH RAPID VENTRICULAR RESPONSE: Primary | ICD-10-CM

## 2022-05-18 DIAGNOSIS — Z79.01 ANTICOAGULATED ON COUMADIN: ICD-10-CM

## 2022-05-18 LAB
INR PPP: 2.7 (ref 0.91–1.09)
PROTHROMBIN TIME: 32.3 SECONDS (ref 10–13.8)

## 2022-05-18 PROCEDURE — 36416 COLLJ CAPILLARY BLOOD SPEC: CPT

## 2022-05-18 PROCEDURE — 85610 PROTHROMBIN TIME: CPT

## 2022-05-18 RX ORDER — WARFARIN SODIUM 6 MG/1
TABLET ORAL
Qty: 95 TABLET | Refills: 0 | Status: SHIPPED | OUTPATIENT
Start: 2022-05-18 | End: 2022-08-30

## 2022-05-18 NOTE — PROGRESS NOTES
Anticoagulation Clinic Progress Note    Anticoagulation Summary  As of 2022    INR goal:  2.0-3.0   TTR:  57.5 % (2.2 y)   INR used for dosin.7 (2022)   Warfarin maintenance plan:  9 mg every Tue; 6 mg all other days   Weekly warfarin total:  45 mg   Plan last modified:  Rani Dhillon [facility] (2021)   Next INR check:  6/15/2022   Priority:  High   Target end date:  Indefinite    Indications    Atrial fibrillation with rapid ventricular response (HCC) [I48.91]  Anticoagulated on Coumadin [Z79.01]             Anticoagulation Episode Summary     INR check location:      Preferred lab:      Send INR reminders to:   JONAH DIAMOND [facility]    Comments:  New to warfarin 2/10; See  tele note for warfarin dosing hx. Afib + left atrial appendage thrombus at present; pending cardioversion upon resolution of CARRI thrombus.      Anticoagulation Care Providers     Provider Role Specialty Phone number    Gavin Luke MD Referring Cardiology [phone]          Clinic Interview:  Patient Findings     Negatives:  Signs/symptoms of thrombosis, Signs/symptoms of bleeding,   Laboratory test error suspected, Change in health, Change in alcohol use,   Change in activity, Upcoming invasive procedure, Emergency department   visit, Upcoming dental procedure, Missed doses, Extra doses, Change in   medications, Change in diet/appetite, Hospital admission, Bruising, Other   complaints      Clinical Outcomes     Negatives:  Major bleeding event, Thromboembolic event,   Anticoagulation-related hospital admission, Anticoagulation-related ED   visit, Anticoagulation-related fatality        INR History:  Anticoagulation Monitoring 3/28/2022 2022 2022   INR 1.9 2.1 2.7   INR Date 3/28/2022 2022 2022   INR Goal 2.0-3.0 2.0-3.0 2.0-3.0   Trend Same Same Same   Last Week Total 48 mg 45 mg 45 mg   Next Week Total 48 mg 45 mg 45 mg   Sun Hold (4/10); Otherwise 6 mg 6 mg 6 mg   Mon 9 mg (3/28),  Hold (4/11); Otherwise 6 mg 6 mg 6 mg   Tue Hold (4/12); Otherwise 9 mg 9 mg 9 mg   Wed 9 mg (4/13); Otherwise 6 mg 6 mg 6 mg   Thu 9 mg (4/14); Otherwise 6 mg 6 mg 6 mg   Fri 6 mg 6 mg 6 mg   Sat 6 mg 6 mg 6 mg   Visit Report - - -   Some recent data might be hidden       Plan:  1. INR is Therapeutic today- see above in Anticoagulation Summary.  Will instruct Milton Moody to Continue their warfarin regimen- see above in Anticoagulation Summary.  2. Follow up in 4 weeks  3. Patient desires warfarin refills.  4. Verbal and written information provided. Patient expresses understanding and has no further questions at this time.    Duarte Jackson [facility]

## 2022-06-15 ENCOUNTER — ANTICOAGULATION VISIT (OUTPATIENT)
Dept: PHARMACY | Facility: HOSPITAL | Age: 57
End: 2022-06-15

## 2022-06-15 DIAGNOSIS — Z79.01 ANTICOAGULATED ON COUMADIN: ICD-10-CM

## 2022-06-15 DIAGNOSIS — I48.91 ATRIAL FIBRILLATION WITH RAPID VENTRICULAR RESPONSE: Primary | ICD-10-CM

## 2022-06-15 LAB
INR PPP: 2.8 (ref 0.91–1.09)
PROTHROMBIN TIME: 34 SECONDS (ref 10–13.8)

## 2022-06-15 PROCEDURE — 85610 PROTHROMBIN TIME: CPT

## 2022-06-15 PROCEDURE — 36416 COLLJ CAPILLARY BLOOD SPEC: CPT

## 2022-06-15 NOTE — PROGRESS NOTES
Anticoagulation Clinic Progress Note    Anticoagulation Summary  As of 6/15/2022    INR goal:  2.0-3.0   TTR:  58.9 % (2.3 y)   INR used for dosin.8 (6/15/2022)   Warfarin maintenance plan:  9 mg every Tue; 6 mg all other days   Weekly warfarin total:  45 mg   No change documented:  Thalia Pierce, Pharmacy Technician   Plan last modified:  Rani Dhillon [facility] (2021)   Next INR check:  2022   Priority:  High   Target end date:  Indefinite    Indications    Atrial fibrillation with rapid ventricular response (HCC) [I48.91]  Anticoagulated on Coumadin [Z79.01]             Anticoagulation Episode Summary     INR check location:      Preferred lab:      Send INR reminders to:   JONAH DIAMOND [facility]    Comments:  New to warfarin 2/10; See  tele note for warfarin dosing hx. Afib + left atrial appendage thrombus at present; pending cardioversion upon resolution of CARRI thrombus.      Anticoagulation Care Providers     Provider Role Specialty Phone number    Gavin Luke MD Referring Cardiology [phone]          Clinic Interview:  Patient Findings     Negatives:  Signs/symptoms of thrombosis, Signs/symptoms of bleeding,   Laboratory test error suspected, Change in health, Change in alcohol use,   Change in activity, Upcoming invasive procedure, Emergency department   visit, Upcoming dental procedure, Missed doses, Extra doses, Change in   medications, Change in diet/appetite, Hospital admission, Bruising, Other   complaints      Clinical Outcomes     Negatives:  Major bleeding event, Thromboembolic event,   Anticoagulation-related hospital admission, Anticoagulation-related ED   visit, Anticoagulation-related fatality        INR History:  Anticoagulation Monitoring 2022 2022 6/15/2022   INR 2.1 2.7 2.8   INR Date 2022 2022 6/15/2022   INR Goal 2.0-3.0 2.0-3.0 2.0-3.0   Trend Same Same Same   Last Week Total 45 mg 45 mg 45 mg   Next Week Total 45 mg 45 mg 45 mg    Sun 6 mg 6 mg 6 mg   Mon 6 mg 6 mg 6 mg   Tue 9 mg 9 mg 9 mg   Wed 6 mg 6 mg 6 mg   Thu 6 mg 6 mg 6 mg   Fri 6 mg 6 mg 6 mg   Sat 6 mg 6 mg 6 mg   Visit Report - - -   Some recent data might be hidden       Plan:  1. INR is therapeutic today- see above in Anticoagulation Summary.   Will instruct Milton Moody to continue their warfarin regimen- see above in Anticoagulation Summary.  2. Follow up in 4 weeks.  3. Patient declines warfarin refills.  4. Verbal and written information provided. Patient expresses understanding and has no further questions at this time.    Thalia Pierce, Pharmacy Technician

## 2022-07-01 ENCOUNTER — OFFICE VISIT (OUTPATIENT)
Dept: CARDIOLOGY | Facility: CLINIC | Age: 57
End: 2022-07-01

## 2022-07-01 VITALS
HEART RATE: 73 BPM | SYSTOLIC BLOOD PRESSURE: 130 MMHG | WEIGHT: 315 LBS | HEIGHT: 69 IN | BODY MASS INDEX: 46.65 KG/M2 | DIASTOLIC BLOOD PRESSURE: 80 MMHG

## 2022-07-01 DIAGNOSIS — E66.01 MORBIDLY OBESE: ICD-10-CM

## 2022-07-01 DIAGNOSIS — I48.19 ATRIAL FIBRILLATION, PERSISTENT: Primary | ICD-10-CM

## 2022-07-01 DIAGNOSIS — I42.8 NICM (NONISCHEMIC CARDIOMYOPATHY): ICD-10-CM

## 2022-07-01 PROCEDURE — 93000 ELECTROCARDIOGRAM COMPLETE: CPT | Performed by: INTERNAL MEDICINE

## 2022-07-01 PROCEDURE — 99214 OFFICE O/P EST MOD 30 MIN: CPT | Performed by: INTERNAL MEDICINE

## 2022-07-01 RX ORDER — PANTOPRAZOLE SODIUM 40 MG/1
TABLET, DELAYED RELEASE ORAL
COMMUNITY
Start: 2022-06-16

## 2022-07-01 NOTE — PROGRESS NOTES
PATIENTINFORMATION    Date of Office Visit: 2022  Encounter Provider: Gavin Luke MD  Place of Service: [facility] CARDIOLOGY  Patient Name: Milton Moody  : 1965    Subjective:     Encounter Date:2022      Patient ID: Milton Moody is a 56 y.o. male.    Chief Complaint   Patient presents with   • Follow-up     HPI  Mr. Moody is a 56 years old gentleman who came to cardiology clinic for follow-up visit.  He was recently admitted and evaluated for hematemesis (patient reports waking up with blood in his bowels without nausea or vomiting) at [place] and subsequently had upper and lower endoscopies and CT of abdomen and chest.  No significant findings noted.  He is hemoglobin has remained normal.  He denies any significant changes in his breathing.  No palpitations, chest pain, orthopnea, PND or extremity swelling.  He works at [employer] and up on his feet and walks a lot without limiting symptoms except for exertional shortness of breath.  No significant bleeding otherwise noted from any site.  Compliant with current treatment regimen including CPAP machine      ROS  All systems reviewed and negative except as noted in HPI.    Past Medical History:   Diagnosis Date   • Abnormal ECG    • Asthma    • Atrial fibrillation (HCC)    • Chest pain    • CKD (chronic kidney disease) stage 3, GFR 30-59 ml/min ([facility]) 3/4/2020   • Hyperlipidemia    • Hypertension    • NICM (nonischemic cardiomyopathy) ([facility]) 3/4/2020   • Nonischemic cardiomyopathy (HCC)    • Palpitations    • Rapid heart rate    • Sleep apnea        Past Surgical History:   Procedure Laterality Date   • KNEE SURGERY Left    • SHOULDER ROTATOR CUFF REPAIR Right 2020       Social History     Socioeconomic History   • Marital status:    Tobacco Use   • Smoking status: Former Smoker     Packs/day: 0.10     Years: 3.00     Pack years: 0.30     Types: Cigarettes   • Smokeless tobacco: Former User     Types: Snuff  "  Substance and Sexual Activity   • Alcohol use: Yes     Alcohol/week: 6.0 standard drinks     Types: 6 Cans of beer per week     Comment: weekend alcohol use   • Drug use: Not Currently     Types: Marijuana   • Sexual activity: Not Currently     Partners: Female       Family History   Problem Relation Age of Onset   • Hypertension Mother    • Hypertension Sister            ECG 12 Lead    Date/Time: 7/1/2022 10:30 AM  Performed by: Gavin Luke MD  Authorized by: Gavin Luke MD   Comparison: compared with previous ECG from 10/18/2021  Similar to previous ECG  Rhythm: atrial fibrillation  Rate: normal  Conduction: non-specific intraventricular conduction delay  ST Segments: ST segments normal  T Waves: T waves normal  QRS axis: normal  Other: no other findings    Clinical impression: abnormal EKG               Objective:     /80 (BP Location: Left arm, Patient Position: Sitting)   Pulse 73   Ht 175.3 cm (69\")   Wt (!) 174 kg (384 lb)   BMI 56.71 kg/m²  Body mass index is 56.71 kg/m².     Constitutional:       General: Not in acute distress.     Appearance: Morbidly obese. Not diaphoretic.   Eyes:      Pupils: Pupils are equal, round, and reactive to light.   HENT:      Head: Normocephalic and atraumatic.   Neck:      Thyroid: No thyromegaly.   Pulmonary:      Effort: Pulmonary effort is normal. No respiratory distress.      Breath sounds: Normal breath sounds. No wheezing. No rales.   Chest:      Chest wall: Not tender to palpatation.   Cardiovascular:      Normal rate. Irregularly irregular rhythm.      No gallop.   Pulses:     Intact distal pulses.   Edema:     Peripheral edema absent.   Abdominal:      General: Bowel sounds are normal. There is no distension.      Palpations: Abdomen is soft.      Tenderness: There is no guarding.   Musculoskeletal: Normal range of motion.         General: No deformity.      Cervical back: Normal range of motion and neck supple. Skin:     General: Skin " is warm and dry.      Findings: No rash.   Neurological:      Mental Status: Alert and oriented to person, place, and time.      Cranial Nerves: No cranial nerve deficit.      Deep Tendon Reflexes: Reflexes are normal and symmetric.   Psychiatric:         Judgment: Judgment normal.         Review Of Data: I have reviewed pertinent recent labs, images and documents and pertinent findings included in HPI or assessment below.          Assessment/Plan:         1.  Persistent atrial fibrillation with controlled ventricular response on chronic anticoagulation with Coumadin  -most recent INR therapeutic  -Continue current care with Coreg 25 mg p.o. twice daily, digoxin and Coumadin    2.  Non ischemic cardiomyopathy-not in overt heart failure  -Myocardial perfusion study on 5/7/2021 with no evidence for ischemia and left ventricular ejection fraction of 21%  -Echo in May 2021 with estimated left ventricular ejection fraction of 41-45%.  -No evidence for heart failure  3.  Left bundle branch block unchanged/nonspecific intraventricular conduction delay on some of the EKGs  4.  Hypertension-well-controlled on lisinopril and Coreg  5.  Morbid obesity with complications/obstructive sleep apnea on CPAP  I have placed a referral for bariatric surgery  6.  Hypercholesterolemia-on a statin-panel at goal  7.  Chronic kidney disease-most recent creatinine is 1.19 done last month   8. Pulmonary nodule-  follows up with pulmonary          No medication changes made today  Return to clinic in 6 months or sooner with any concerning symptoms.      Diagnosis and plan of care discussed with patient and verbalized understanding.            Your medication list          Accurate as of July 1, 2022 10:47 AM. If you have any questions, ask your nurse or doctor.            CONTINUE taking these medications      Instructions Last Dose Given Next Dose Due   atorvastatin 40 MG tablet  Commonly known as: LIPITOR      Take 40 mg by mouth Every  Night.       carvedilol 25 MG tablet  Commonly known as: COREG      TAKE 1 TABLET BY MOUTH TWICE DAILY WITH MEALS       digoxin 250 MCG tablet  Commonly known as: LANOXIN      Take 1 tablet by mouth Daily.       famotidine 40 MG tablet  Commonly known as: PEPCID      Take 1 tablet by mouth Daily.       HYDROcodone-acetaminophen  MG per tablet  Commonly known as: NORCO      TK 1 T PO  Q 12 H PRN P       lisinopril 10 MG tablet  Commonly known as: PRINIVIL,ZESTRIL      Take 1 tablet by mouth Daily.       pantoprazole 40 MG EC tablet  Commonly known as: PROTONIX           sennosides-docusate 8.6-50 MG per tablet  Commonly known as: PERICOLACE      Take 1 tablet by mouth Daily.       warfarin 6 MG tablet  Commonly known as: COUMADIN      Take one and one-half tablets (9 mg) by mouth on Tues, and take one tablet (6 mg) by mouth all other days or as directed.                  Gavin Luke MD  07/01/22  10:47 EDT

## 2022-07-13 ENCOUNTER — ANTICOAGULATION VISIT (OUTPATIENT)
Dept: PHARMACY | Facility: HOSPITAL | Age: 57
End: 2022-07-13

## 2022-07-13 DIAGNOSIS — I48.91 ATRIAL FIBRILLATION WITH RAPID VENTRICULAR RESPONSE: Primary | ICD-10-CM

## 2022-07-13 DIAGNOSIS — Z79.01 ANTICOAGULATED ON COUMADIN: ICD-10-CM

## 2022-07-13 LAB
INR PPP: 2.3 (ref 0.91–1.09)
PROTHROMBIN TIME: 28 SECONDS (ref 10–13.8)

## 2022-07-13 PROCEDURE — 85610 PROTHROMBIN TIME: CPT

## 2022-07-13 PROCEDURE — 36416 COLLJ CAPILLARY BLOOD SPEC: CPT

## 2022-07-13 NOTE — PROGRESS NOTES
I have supervised and reviewed the notes, assessments, and/or procedures performed by our  pharmacy student . The documented assessment and plan were developed cooperatively, and the plan was not implemented in my presence. I concur with the documentation of this patient encounter.'

## 2022-07-13 NOTE — PROGRESS NOTES
Anticoagulation Clinic Progress Note    Anticoagulation Summary  As of 2022    INR goal:  2.0-3.0   TTR:  60.2 % (2.4 y)   INR used for dosin.3 (2022)   Warfarin maintenance plan:  9 mg every Tue; 6 mg all other days   Weekly warfarin total:  45 mg   No change documented:  Maldonado Darnell, Pharmacy Intern   Plan last modified:  Rani Dhillon [facility] (2021)   Next INR check:  2022   Priority:  High   Target end date:  Indefinite    Indications    Atrial fibrillation with rapid ventricular response (HCC) [I48.91]  Anticoagulated on Coumadin [Z79.01]             Anticoagulation Episode Summary     INR check location:      Preferred lab:      Send INR reminders to:   JONAH DIAMOND [facility]    Comments:  New to warfarin 2/10; See  tele note for warfarin dosing hx. Afib + left atrial appendage thrombus at present; pending cardioversion upon resolution of CARRI thrombus.      Anticoagulation Care Providers     Provider Role Specialty Phone number    Gavin Luke MD Referring Cardiology [phone]          Clinic Interview:  Patient Findings     Negatives:  Signs/symptoms of thrombosis, Signs/symptoms of bleeding,   Laboratory test error suspected, Change in health, Change in alcohol use,   Change in activity, Upcoming invasive procedure, Emergency department   visit, Upcoming dental procedure, Missed doses, Extra doses, Change in   medications, Change in diet/appetite, Hospital admission, Bruising, Other   complaints    Comments:  Patient denies any changes to health/diet/meds.      Clinical Outcomes     Negatives:  Major bleeding event, Thromboembolic event,   Anticoagulation-related hospital admission, Anticoagulation-related ED   visit, Anticoagulation-related fatality    Comments:  Patient denies any changes to health/diet/meds.        INR History:  Anticoagulation Monitoring 2022 6/15/2022 2022   INR 2.7 2.8 2.3   INR Date 2022 6/15/2022 2022   INR  Goal 2.0-3.0 2.0-3.0 2.0-3.0   Trend Same Same Same   Last Week Total 45 mg 45 mg 45 mg   Next Week Total 45 mg 45 mg 45 mg   Sun 6 mg 6 mg 6 mg   Mon 6 mg 6 mg 6 mg   Tue 9 mg 9 mg 9 mg   Wed 6 mg 6 mg 6 mg   Thu 6 mg 6 mg 6 mg   Fri 6 mg 6 mg 6 mg   Sat 6 mg 6 mg 6 mg   Visit Report - - -   Some recent data might be hidden       Plan:  1. INR is Therapeutic today- see above in Anticoagulation Summary.  Will instruct Milton Moody to Continue their warfarin regimen- see above in Anticoagulation Summary.  2. Follow up in 6 weeks  3. Patient declines warfarin refills.  4. Verbal and written information provided. Patient expresses understanding and has no further questions at this time.    Maldonado Darnell, Pharmacy Intern

## 2022-08-02 RX ORDER — NITROGLYCERIN 0.4 MG/1
TABLET SUBLINGUAL
Qty: 100 TABLET | Refills: 3 | OUTPATIENT
Start: 2022-08-02

## 2022-08-30 RX ORDER — WARFARIN SODIUM 6 MG/1
TABLET ORAL
Qty: 95 TABLET | Refills: 0 | Status: SHIPPED | OUTPATIENT
Start: 2022-08-30 | End: 2022-11-28 | Stop reason: SDUPTHER

## 2022-09-06 ENCOUNTER — ANTICOAGULATION VISIT (OUTPATIENT)
Dept: PHARMACY | Facility: HOSPITAL | Age: 57
End: 2022-09-06

## 2022-09-06 ENCOUNTER — HOSPITAL ENCOUNTER (OUTPATIENT)
Dept: CT IMAGING | Facility: HOSPITAL | Age: 57
Discharge: HOME | End: 2022-09-06
Admitting: INTERNAL MEDICINE

## 2022-09-06 DIAGNOSIS — R91.1 PULMONARY NODULE: ICD-10-CM

## 2022-09-06 DIAGNOSIS — Z79.01 ANTICOAGULATED ON COUMADIN: ICD-10-CM

## 2022-09-06 DIAGNOSIS — I48.91 ATRIAL FIBRILLATION WITH RAPID VENTRICULAR RESPONSE: Primary | ICD-10-CM

## 2022-09-06 LAB
INR PPP: 2.2 (ref 0.91–1.09)
PROTHROMBIN TIME: 26 SECONDS (ref 10–13.8)

## 2022-09-06 PROCEDURE — 36416 COLLJ CAPILLARY BLOOD SPEC: CPT

## 2022-09-06 PROCEDURE — 71250 CT THORAX DX C-: CPT

## 2022-09-06 PROCEDURE — 85610 PROTHROMBIN TIME: CPT

## 2022-09-06 NOTE — PROGRESS NOTES
Anticoagulation Clinic Progress Note    Anticoagulation Summary  As of 2022    INR goal:  2.0-3.0   TTR:  62.6 % (2.5 y)   INR used for dosin.2 (2022)   Warfarin maintenance plan:  9 mg every Tue; 6 mg all other days   Weekly warfarin total:  45 mg   No change documented:  Beatrice Luna, Pharmacy Intern   Plan last modified:  Rani Dhillon [facility] (2021)   Next INR check:  10/18/2022   Priority:  High   Target end date:  Indefinite    Indications    Atrial fibrillation with rapid ventricular response (HCC) [I48.91]  Anticoagulated on Coumadin [Z79.01]             Anticoagulation Episode Summary     INR check location:      Preferred lab:      Send INR reminders to:   [facility] DIAMOND [facility] [place]    Comments:  New to warfarin 2/10; See  tele note for warfarin dosing hx. Afib + left atrial appendage thrombus at present; pending cardioversion upon resolution of CARRI thrombus.      Anticoagulation Care Providers     Provider Role Specialty Phone number    Gavin Luke MD Referring Cardiology [phone]          Clinic Interview:  Patient Findings     Negatives:  Signs/symptoms of thrombosis, Signs/symptoms of bleeding,   Laboratory test error suspected, Change in health, Change in alcohol use,   Change in activity, Upcoming invasive procedure, Emergency department   visit, Upcoming dental procedure, Missed doses, Extra doses, Change in   medications, Change in diet/appetite, Hospital admission, Bruising, Other   complaints      Clinical Outcomes     Negatives:  Major bleeding event, Thromboembolic event,   Anticoagulation-related hospital admission, Anticoagulation-related ED   visit, Anticoagulation-related fatality        INR History:  Anticoagulation Monitoring 6/15/2022 2022 2022   INR 2.8 2.3 2.2   INR Date 6/15/2022 2022 2022   INR Goal 2.0-3.0 2.0-3.0 2.0-3.0   Trend Same Same Same   Last Week Total 45 mg 45 mg 45 mg   Next Week Total 45 mg 45 mg 45 mg   Sun 6  mg 6 mg 6 mg   Mon 6 mg 6 mg 6 mg   Tue 9 mg 9 mg 9 mg   Wed 6 mg 6 mg 6 mg   Thu 6 mg 6 mg 6 mg   Fri 6 mg 6 mg 6 mg   Sat 6 mg 6 mg 6 mg   Visit Report - - -   Some recent data might be hidden       Plan:  1. INR is Therapeutic today- see above in Anticoagulation Summary.  Will instruct Milton Moody to Continue their warfarin regimen- see above in Anticoagulation Summary.  2. Follow up in 6 weeks  3. Patient declines warfarin refills.  4. Verbal and written information provided. Patient expresses understanding and has no further questions at this time.    Beatrice Luna, Pharmacy Intern

## 2022-10-23 RX ORDER — DIGOXIN 250 MCG
250 TABLET ORAL DAILY
Qty: 90 TABLET | Refills: 3 | Status: SHIPPED | OUTPATIENT
Start: 2022-10-23 | End: 2023-01-25 | Stop reason: ALTCHOICE

## 2022-11-01 ENCOUNTER — TELEPHONE (OUTPATIENT)
Dept: PHARMACY | Facility: HOSPITAL | Age: 57
End: 2022-11-01

## 2022-11-28 ENCOUNTER — ANTICOAGULATION VISIT (OUTPATIENT)
Dept: PHARMACY | Facility: HOSPITAL | Age: 57
End: 2022-11-28

## 2022-11-28 DIAGNOSIS — Z79.01 ANTICOAGULATED ON COUMADIN: ICD-10-CM

## 2022-11-28 DIAGNOSIS — I48.91 ATRIAL FIBRILLATION WITH RAPID VENTRICULAR RESPONSE: Primary | ICD-10-CM

## 2022-11-28 LAB
INR PPP: 1.7 (ref 0.91–1.09)
PROTHROMBIN TIME: 20.4 SECONDS (ref 10–13.8)

## 2022-11-28 PROCEDURE — 85610 PROTHROMBIN TIME: CPT

## 2022-11-28 PROCEDURE — 36416 COLLJ CAPILLARY BLOOD SPEC: CPT

## 2022-11-28 PROCEDURE — G0463 HOSPITAL OUTPT CLINIC VISIT: HCPCS

## 2022-11-28 RX ORDER — WARFARIN SODIUM 6 MG/1
TABLET ORAL
Qty: 100 TABLET | Refills: 1 | Status: SHIPPED | OUTPATIENT
Start: 2022-11-28 | End: 2022-12-21 | Stop reason: SDUPTHER

## 2022-11-28 NOTE — PROGRESS NOTES
Anticoagulation Clinic Progress Note    Anticoagulation Summary  As of 2022    INR goal:  2.0-3.0   TTR:  60.7 % (2.8 y)   INR used for dosin.7 (2022)   Warfarin maintenance plan:  9 mg every Tue; 6 mg all other days; Starting 2022   Weekly warfarin total:  45 mg   Plan last modified:  Rani Dhillon [facility] (2021)   Next INR check:  2022   Priority:  High   Target end date:  Indefinite    Indications    Atrial fibrillation with rapid ventricular response (HCC) [I48.91]  Anticoagulated on Coumadin [Z79.01]             Anticoagulation Episode Summary     INR check location:      Preferred lab:      Send INR reminders to:   JONAH DIAMOND [facility]    Comments:  New to warfarin 2/10; See  tele note for warfarin dosing hx. Afib + left atrial appendage thrombus at present; pending cardioversion upon resolution of CARRI thrombus.      Anticoagulation Care Providers     Provider Role Specialty Phone number    Gavin Luke MD Referring Cardiology [phone]          Clinic Interview:  Patient Findings     Positives:  Change in medications, Change in diet/appetite    Negatives:  Signs/symptoms of thrombosis, Signs/symptoms of bleeding,   Laboratory test error suspected, Change in health, Change in alcohol use,   Change in activity, Upcoming invasive procedure, Emergency department   visit, Upcoming dental procedure, Missed doses, Extra doses, Hospital   admission, Bruising, Other complaints    Comments:  Reports he is taking methocarbamol for shoulder pain. Reports   he ate greens over Thanksgiving (more vit k than usual).       Clinical Outcomes     Negatives:  Major bleeding event, Thromboembolic event,   Anticoagulation-related hospital admission, Anticoagulation-related ED   visit, Anticoagulation-related fatality    Comments:  Reports he is taking methocarbamol for shoulder pain. Reports   he ate greens over Thanksgiving (more vit k than usual).         INR  History:  Anticoagulation Monitoring 7/13/2022 9/6/2022 11/28/2022   INR 2.3 2.2 1.7   INR Date 7/13/2022 9/6/2022 11/28/2022   INR Goal 2.0-3.0 2.0-3.0 2.0-3.0   Trend Same Same Same   Last Week Total 45 mg 45 mg 45 mg   Next Week Total 45 mg 45 mg 48 mg   Sun 6 mg 6 mg 6 mg   Mon 6 mg 6 mg 9 mg (11/28); Otherwise 6 mg   Tue 9 mg 9 mg 9 mg   Wed 6 mg 6 mg 6 mg   Thu 6 mg 6 mg 6 mg   Fri 6 mg 6 mg 6 mg   Sat 6 mg 6 mg 6 mg   Visit Report - - -   Some recent data might be hidden       Plan:  1. INR is Subtherapeutic today- see above in Anticoagulation Summary.  Will instruct Milton Moody to Change their warfarin regimen- see above in Anticoagulation Summary.  2. Follow up in 2 weeks  3. Patient desires warfarin refills.  4. Verbal and written information provided. Patient expresses understanding and has no further questions at this time.    Jimi Reid, PharmD

## 2022-12-21 ENCOUNTER — ANTICOAGULATION VISIT (OUTPATIENT)
Dept: PHARMACY | Facility: HOSPITAL | Age: 57
End: 2022-12-21

## 2022-12-21 DIAGNOSIS — Z79.01 ANTICOAGULATED ON COUMADIN: ICD-10-CM

## 2022-12-21 DIAGNOSIS — I48.91 ATRIAL FIBRILLATION WITH RAPID VENTRICULAR RESPONSE: Primary | ICD-10-CM

## 2022-12-21 RX ORDER — WARFARIN SODIUM 6 MG/1
TABLET ORAL
Qty: 100 TABLET | Refills: 1 | Status: SHIPPED | OUTPATIENT
Start: 2022-12-21 | End: 2023-02-07 | Stop reason: SDUPTHER

## 2023-01-10 ENCOUNTER — ANTICOAGULATION VISIT (OUTPATIENT)
Dept: PHARMACY | Facility: HOSPITAL | Age: 58
End: 2023-01-10
Payer: COMMERCIAL

## 2023-01-10 DIAGNOSIS — Z79.01 ANTICOAGULATED ON COUMADIN: ICD-10-CM

## 2023-01-10 DIAGNOSIS — I48.91 ATRIAL FIBRILLATION WITH RAPID VENTRICULAR RESPONSE: Primary | ICD-10-CM

## 2023-01-10 LAB
INR PPP: 1.5 (ref 0.91–1.09)
PROTHROMBIN TIME: 17.4 SECONDS (ref 10–13.8)

## 2023-01-10 PROCEDURE — 85610 PROTHROMBIN TIME: CPT

## 2023-01-10 PROCEDURE — G0463 HOSPITAL OUTPT CLINIC VISIT: HCPCS

## 2023-01-10 PROCEDURE — 36416 COLLJ CAPILLARY BLOOD SPEC: CPT

## 2023-01-10 NOTE — PROGRESS NOTES
Anticoagulation Clinic Progress Note    Anticoagulation Summary  As of 1/10/2023    INR goal:  2.0-3.0   TTR:  58.2 % (2.9 y)   INR used for dosin.5 (1/10/2023)   Warfarin maintenance plan:  9 mg every Tue; 6 mg all other days; Starting 1/10/2023   Weekly warfarin total:  45 mg   Plan last modified:  Rani Dhillon, [facility] (2021)   Next INR check:  2023   Priority:  High   Target end date:  Indefinite    Indications    Atrial fibrillation with rapid ventricular response (HCC) [I48.91]  Anticoagulated on Coumadin [Z79.01]             Anticoagulation Episode Summary     INR check location:      Preferred lab:      Send INR reminders to:   JONAH DIAMOND [facility]    Comments:  New to warfarin 2/10; See  tele note for warfarin dosing hx. Afib + left atrial appendage thrombus at present; pending cardioversion upon resolution of CARRI thrombus.      Anticoagulation Care Providers     Provider Role Specialty Phone number    Gavin Luke MD Referring Cardiology [phone]          Clinic Interview:  Patient Findings     Positives:  Missed doses    Negatives:  Signs/symptoms of thrombosis, Signs/symptoms of bleeding,   Laboratory test error suspected, Change in health, Change in alcohol use,   Change in activity, Upcoming invasive procedure, Emergency department   visit, Upcoming dental procedure, Extra doses, Change in medications,   Change in diet/appetite, Hospital admission, Bruising, Other complaints    Comments:  missed dose two days because he ran out of warfarin. Also   reports starting qunol melatonin gummies (also has l-theanine and   ashwa-gahdha).       Clinical Outcomes     Negatives:  Major bleeding event, Thromboembolic event,   Anticoagulation-related hospital admission, Anticoagulation-related ED   visit, Anticoagulation-related fatality    Comments:  missed dose two days because he ran out of warfarin. Also   reports starting qunol melatonin gummies (also has l-theanine and    [facility]).         INR History:  Anticoagulation Monitoring 9/6/2022 11/28/2022 12/21/2022 1/10/2023   INR 2.2 1.7 - 1.5   INR Date 9/6/2022 11/28/2022 - 1/10/2023   INR Goal 2.0-3.0 2.0-3.0 2.0-3.0 2.0-3.0   Trend Same Same - Same   Last Week Total 45 mg 45 mg - 33 mg   Next Week Total 45 mg 48 mg - 48 mg   Sun 6 mg 6 mg - 6 mg   Mon 6 mg 9 mg (11/28); Otherwise 6 mg - 6 mg   Tue 9 mg 9 mg - 12 mg (1/10); Otherwise 9 mg   Wed 6 mg 6 mg - 6 mg   Thu 6 mg 6 mg - 6 mg   Fri 6 mg 6 mg - 6 mg   Sat 6 mg 6 mg - 6 mg   Visit Report - - - -   Some recent data might be hidden       Plan:  1. INR is Subtherapeutic today- see above in Anticoagulation Summary.  Will instruct Milton Moody to Increase their warfarin regimen- see above in Anticoagulation Summary.  2. Follow up in 2 weeks  3. Patient declines warfarin refills.  4. Verbal and written information provided. Patient expresses understanding and has no further questions at this time.    Jessica Talbot [facility]

## 2023-01-25 ENCOUNTER — OFFICE VISIT (OUTPATIENT)
Dept: CARDIOLOGY | Facility: CLINIC | Age: 58
End: 2023-01-25
Payer: COMMERCIAL

## 2023-01-25 VITALS
HEART RATE: 67 BPM | DIASTOLIC BLOOD PRESSURE: 94 MMHG | SYSTOLIC BLOOD PRESSURE: 136 MMHG | BODY MASS INDEX: 46.65 KG/M2 | OXYGEN SATURATION: 92 % | HEIGHT: 69 IN | WEIGHT: 315 LBS

## 2023-01-25 DIAGNOSIS — E66.01 MORBIDLY OBESE: ICD-10-CM

## 2023-01-25 DIAGNOSIS — Z79.01 ANTICOAGULATED ON COUMADIN: ICD-10-CM

## 2023-01-25 DIAGNOSIS — I48.91 ATRIAL FIBRILLATION WITH RAPID VENTRICULAR RESPONSE: Primary | ICD-10-CM

## 2023-01-25 DIAGNOSIS — I42.8 NICM (NONISCHEMIC CARDIOMYOPATHY): ICD-10-CM

## 2023-01-25 DIAGNOSIS — R06.09 DOE (DYSPNEA ON EXERTION): ICD-10-CM

## 2023-01-25 DIAGNOSIS — I10 PRIMARY HYPERTENSION: ICD-10-CM

## 2023-01-25 PROCEDURE — 99214 OFFICE O/P EST MOD 30 MIN: CPT | Performed by: INTERNAL MEDICINE

## 2023-01-25 PROCEDURE — 93000 ELECTROCARDIOGRAM COMPLETE: CPT | Performed by: INTERNAL MEDICINE

## 2023-01-25 RX ORDER — METOPROLOL TARTRATE 100 MG/1
100 TABLET ORAL 2 TIMES DAILY
Qty: 180 TABLET | Refills: 3 | Status: SHIPPED | OUTPATIENT
Start: 2023-01-25

## 2023-01-25 RX ORDER — DIGOXIN 250 MCG
250 TABLET ORAL
Qty: 90 TABLET | Refills: 3 | Status: SHIPPED | OUTPATIENT
Start: 2023-01-25

## 2023-01-25 RX ORDER — CHLORAL HYDRATE 500 MG
CAPSULE ORAL
COMMUNITY

## 2023-01-25 RX ORDER — FUROSEMIDE 40 MG/1
40 TABLET ORAL DAILY
Qty: 5 TABLET | Refills: 0 | Status: SHIPPED | OUTPATIENT
Start: 2023-01-25 | End: 2023-02-06

## 2023-01-25 RX ORDER — METHOCARBAMOL 500 MG/1
500 TABLET, FILM COATED ORAL
COMMUNITY
Start: 2022-11-27 | End: 2023-01-25 | Stop reason: ALTCHOICE

## 2023-01-25 RX ORDER — FLUTICASONE PROPIONATE 50 MCG
SPRAY, SUSPENSION (ML) NASAL
COMMUNITY
Start: 2022-12-29

## 2023-01-25 RX ORDER — CYCLOBENZAPRINE HCL 10 MG
10 TABLET ORAL NIGHTLY PRN
COMMUNITY
Start: 2022-11-04

## 2023-01-25 RX ORDER — POTASSIUM CHLORIDE 1500 MG/1
20 TABLET, FILM COATED, EXTENDED RELEASE ORAL 2 TIMES DAILY
Qty: 5 TABLET | Refills: 0 | Status: SHIPPED | OUTPATIENT
Start: 2023-01-25 | End: 2023-02-06

## 2023-01-25 RX ORDER — LISINOPRIL 40 MG/1
40 TABLET ORAL DAILY
COMMUNITY
Start: 2023-01-18

## 2023-01-25 NOTE — PROGRESS NOTES
PATIENTINFORMATION    Date of Office Visit: 2023  Encounter Provider: Gavin Luke MD  Place of Service: [facility] CARDIOLOGY  Patient Name: Milton Moody  : 1965    Subjective:     Encounter Date:2023      Patient ID: Milton Moody is a 57 y.o. male.    Chief Complaint   Patient presents with   • Atrial Fibrillation     HPI  Mr. Moody is a 58 yo gentleman came to cardiology clinic for follow-up visit.  He reports some worsening of shortness of breath over the past 1 month and he believes he has gained about 10 pounds.  Heart rate has also been running on the higher side compared to baseline.  Blood pressure within range.  Patient report digoxin was discontinued several months ago by other providers and did not get a refill.  His dig level has always been within range.  He denies any orthopnea, PND, palpitations, presyncope or syncope or significant new lower extremity swelling  He is compliant with current medications and CPAP otherwise.    No recent  ER visit or hospitalization since last clinic visit.       ROS  All systems reviewed and negative except as noted in HPI.    Past Medical History:   Diagnosis Date   • Abnormal ECG    • Asthma    • Atrial fibrillation (HCC)    • Chest pain    • CKD (chronic kidney disease) stage 3, GFR 30-59 ml/min ([facility]) 3/4/2020   • Hyperlipidemia    • Hypertension    • NICM (nonischemic cardiomyopathy) ([facility]) 3/4/2020   • Nonischemic cardiomyopathy (HCC)    • Palpitations    • Rapid heart rate    • Sleep apnea        Past Surgical History:   Procedure Laterality Date   • KNEE SURGERY Left    • SHOULDER ROTATOR CUFF REPAIR Right 2020       Social History     Socioeconomic History   • Marital status:    Tobacco Use   • Smoking status: Former     Packs/day: 0.10     Years: 3.00     Pack years: 0.30     Types: Cigarettes   • Smokeless tobacco: Former     Types: Snuff   Substance and Sexual Activity   • Alcohol use: Yes      "Alcohol/week: 6.0 standard drinks     Types: 6 Cans of beer per week     Comment: weekend alcohol use   • Drug use: Not Currently     Types: Marijuana   • Sexual activity: Not Currently     Partners: Female       Family History   Problem Relation Age of Onset   • Hypertension Mother    • Hypertension Sister            ECG 12 Lead    Date/Time: 1/25/2023 11:06 AM  Performed by: Gavin Luke MD  Authorized by: Gavin Luke MD   Comparison: compared with previous ECG from 7/1/2022  Similar to previous ECG  Rhythm: atrial fibrillation  Rate: normal  Conduction: non-specific intraventricular conduction delay  ST Segments: ST segments normal  T Waves: T waves normal  QRS axis: normal  Other: no other findings    Clinical impression: normal ECG               Objective:     /94 (BP Location: Left arm, Patient Position: Sitting, Cuff Size: Large Adult)   Pulse 67   Ht 175.3 cm (69\")   Wt (!) 175 kg (384 lb 12.8 oz)   SpO2 92%   BMI 56.83 kg/m²  Body mass index is 56.83 kg/m².     Constitutional:       General: Not in acute distress.     Appearance: Morbidly obese. Not diaphoretic.   Eyes:      Pupils: Pupils are equal, round, and reactive to light.   HENT:      Head: Normocephalic and atraumatic.   Neck:      Thyroid: No thyromegaly.   Pulmonary:      Effort: Pulmonary effort is normal. No respiratory distress.      Breath sounds: Normal breath sounds. No wheezing. No rales.   Chest:      Chest wall: Not tender to palpatation.   Cardiovascular:      Normal rate. Irregularly irregular rhythm.      No gallop.   Pulses:     Intact distal pulses.   Edema:     Peripheral edema absent.   Abdominal:      General: Bowel sounds are normal. There is no distension.      Palpations: Abdomen is soft.      Tenderness: There is no guarding.   Musculoskeletal: Normal range of motion.         General: No deformity.      Cervical back: Normal range of motion and neck supple. Skin:     General: Skin is warm and " dry.      Findings: No rash.   Neurological:      Mental Status: Alert and oriented to person, place, and time.      Cranial Nerves: No cranial nerve deficit.      Deep Tendon Reflexes: Reflexes are normal and symmetric.   Psychiatric:         Judgment: Judgment normal.         Review Of Data: I have reviewed pertinent recent labs, images and documents and pertinent findings included in HPI or assessment below.          Assessment/Plan:         1.  Persistent atrial fibrillation with rapid ventricular response-likely due to discontinuation of digoxin  -INR noted to be subtherapeutic this month  -I have refilled digoxin, switched him to metoprolol from Coreg and continue anticoagulation.  Dig level after 4 days, 24-hour Holter next week  Get limited echocardiogram to evaluate left ventricular ejection fraction     2.  Non ischemic cardiomyopathy-not in overt heart failure  -Myocardial perfusion study on 5/7/2021 with no evidence for ischemia and left ventricular ejection fraction of 21%  -Echo in May 2021 with estimated left ventricular ejection fraction of 41-45%.  -Slightly worsened breathing- likely due to A. fib with RVR.   I will give him a diuretic for 5 days hoping his heart rate will improve helping with breathing.  3.  Left bundle branch block unchanged/nonspecific intraventricular conduction delay on some of the EKGs  4.  Hypertension-well-controlled on lisinopril and Coreg  5.  Morbid obesity with complications/obstructive sleep apnea on CPAP  I have placed a referral for bariatric surgery  6.  Hypercholesterolemia-on a statin-panel at goal  7.  Chronic kidney disease-stable creatinine   8. Pulmonary nodule-  follows up with pulmonary      Diagnosis and plan of care discussed with patient and verbalized understanding.            Your medication list          Accurate as of January 25, 2023 12:20 PM. If you have any questions, ask your nurse or doctor.            START taking these medications       Instructions Last Dose Given Next Dose Due   digoxin 250 MCG tablet  Commonly known as: Lanoxin  Started by: Gavin Luke MD      Take 1 tablet by mouth Daily.       furosemide 40 MG tablet  Commonly known as: LASIX  Started by: Gavin Luke MD      Take 1 tablet by mouth Daily.       metoprolol tartrate 100 MG tablet  Commonly known as: LOPRESSOR  Started by: Gavin Luke MD      Take 1 tablet by mouth 2 (Two) Times a Day.       potassium chloride ER 20 MEQ tablet controlled-release ER tablet  Commonly known as: K-TAB  Started by: Gavin Luke MD      Take 1 tablet by mouth 2 (Two) Times a Day.          CONTINUE taking these medications      Instructions Last Dose Given Next Dose Due   atorvastatin 40 MG tablet  Commonly known as: LIPITOR      Take 40 mg by mouth Every Night.       cyclobenzaprine 10 MG tablet  Commonly known as: FLEXERIL      Take 10 mg by mouth At Night As Needed.       fish oil 1000 MG capsule capsule      Take  by mouth Daily With Breakfast.       fluticasone 50 MCG/ACT nasal spray  Commonly known as: FLONASE           lisinopril 40 MG tablet  Commonly known as: PRINIVIL,ZESTRIL      Take 40 mg by mouth Daily.       pantoprazole 40 MG EC tablet  Commonly known as: PROTONIX           Potassium 99 MG tablet      Take  by mouth.       sennosides-docusate 8.6-50 MG per tablet  Commonly known as: PERICOLACE      Take 1 tablet by mouth Daily.       warfarin 6 MG tablet  Commonly known as: COUMADIN      Take one and one-half tablets (9 mg) by mouth on Tuesdays, and take one tablet (6 mg) by mouth all other days or as directed.          STOP taking these medications    carvedilol 25 MG tablet  Commonly known as: COREG  Stopped by: Gavin Luke MD              Where to Get Your Medications      These medications were sent to [facility] [id number] - [place], [place] - [address] [facility] [phone] [facility] [phone] [address] [place] [address]  [place] [address]    Phone: [phone]   · digoxin 250 MCG tablet  · furosemide 40 MG tablet  · metoprolol tartrate 100 MG tablet  · potassium chloride ER 20 MEQ tablet controlled-release ER tablet             Gavin Luke MD  01/25/23  12:20 EST

## 2023-01-30 ENCOUNTER — LAB (OUTPATIENT)
Dept: LAB | Facility: HOSPITAL | Age: 58
End: 2023-01-30
Payer: COMMERCIAL

## 2023-01-30 DIAGNOSIS — I48.91 ATRIAL FIBRILLATION WITH RAPID VENTRICULAR RESPONSE: Primary | ICD-10-CM

## 2023-01-30 DIAGNOSIS — R06.09 DOE (DYSPNEA ON EXERTION): ICD-10-CM

## 2023-01-30 DIAGNOSIS — I48.91 ATRIAL FIBRILLATION WITH RAPID VENTRICULAR RESPONSE: ICD-10-CM

## 2023-01-30 LAB
DIGOXIN SERPL-MCNC: 1.2 NG/ML (ref 0.6–1.2)
NT-PROBNP SERPL-MCNC: 420 PG/ML (ref 0–900)

## 2023-01-30 PROCEDURE — 83880 ASSAY OF NATRIURETIC PEPTIDE: CPT

## 2023-01-30 PROCEDURE — 36415 COLL VENOUS BLD VENIPUNCTURE: CPT

## 2023-01-30 PROCEDURE — 80162 ASSAY OF DIGOXIN TOTAL: CPT

## 2023-01-31 ENCOUNTER — TELEPHONE (OUTPATIENT)
Dept: CARDIOLOGY | Facility: CLINIC | Age: 58
End: 2023-01-31
Payer: COMMERCIAL

## 2023-01-31 NOTE — TELEPHONE ENCOUNTER
Called and left VM. Will continue to try to reach patient.     Milagro Robles RN  Triage [facility]

## 2023-01-31 NOTE — TELEPHONE ENCOUNTER
----- Message from Gavin Luke MD sent at 1/30/2023  3:41 PM EST -----  Please notify Milton that digoxin level is normal for now.  I want him to get a repeat test in 1 week.  I have placed orders.  He should take morning dose of digoxin immediately after blood work.  Let me know if he has any questions.  Thank you

## 2023-01-31 NOTE — TELEPHONE ENCOUNTER
Notified patient of results/recommendations. Patient verbalized understanding.    Milagro Robles RN  Triage [facility]\

## 2023-02-02 ENCOUNTER — HOSPITAL ENCOUNTER (OUTPATIENT)
Dept: CARDIOLOGY | Facility: HOSPITAL | Age: 58
Discharge: HOME | End: 2023-02-02
Admitting: INTERNAL MEDICINE
Payer: COMMERCIAL

## 2023-02-02 VITALS
SYSTOLIC BLOOD PRESSURE: 140 MMHG | WEIGHT: 315 LBS | HEART RATE: 79 BPM | HEIGHT: 69 IN | OXYGEN SATURATION: 98 % | DIASTOLIC BLOOD PRESSURE: 90 MMHG | BODY MASS INDEX: 46.65 KG/M2

## 2023-02-02 DIAGNOSIS — R06.09 DOE (DYSPNEA ON EXERTION): ICD-10-CM

## 2023-02-02 LAB
AORTIC ARCH: 3.8 CM
ASCENDING AORTA: 3.3 CM
BH CV ECHO MEAS - ACS: 1.91 CM
BH CV ECHO MEAS - AO MAX PG: 8.8 MMHG
BH CV ECHO MEAS - AO MEAN PG: 6.8 MMHG
BH CV ECHO MEAS - AO ROOT DIAM: 3.3 CM
BH CV ECHO MEAS - AO V2 MAX: 148.3 CM/SEC
BH CV ECHO MEAS - AO V2 VTI: 29.2 CM
BH CV ECHO MEAS - AVA(I,D): 1.03 CM2
BH CV ECHO MEAS - EDV(CUBED): 171.3 ML
BH CV ECHO MEAS - EDV(MOD-SP2): 198 ML
BH CV ECHO MEAS - EDV(MOD-SP4): 208 ML
BH CV ECHO MEAS - EF(MOD-BP): 40.8 %
BH CV ECHO MEAS - EF(MOD-SP2): 42.4 %
BH CV ECHO MEAS - EF(MOD-SP4): 41.8 %
BH CV ECHO MEAS - ESV(CUBED): 90.9 ML
BH CV ECHO MEAS - ESV(MOD-SP2): 114 ML
BH CV ECHO MEAS - ESV(MOD-SP4): 121 ML
BH CV ECHO MEAS - FS: 19 %
BH CV ECHO MEAS - IVS/LVPW: 0.93 CM
BH CV ECHO MEAS - IVSD: 1.29 CM
BH CV ECHO MEAS - LAT PEAK E' VEL: 8.1 CM/SEC
BH CV ECHO MEAS - LV DIASTOLIC VOL/BSA (35-75): 76.3 CM2
BH CV ECHO MEAS - LV MASS(C)D: 320.7 GRAMS
BH CV ECHO MEAS - LV MAX PG: 1.31 MMHG
BH CV ECHO MEAS - LV MEAN PG: 1.06 MMHG
BH CV ECHO MEAS - LV SYSTOLIC VOL/BSA (12-30): 44.4 CM2
BH CV ECHO MEAS - LV V1 MAX: 57.3 CM/SEC
BH CV ECHO MEAS - LV V1 VTI: 9.2 CM
BH CV ECHO MEAS - LVIDD: 5.6 CM
BH CV ECHO MEAS - LVIDS: 4.5 CM
BH CV ECHO MEAS - LVOT AREA: 3.3 CM2
BH CV ECHO MEAS - LVOT DIAM: 2.05 CM
BH CV ECHO MEAS - LVPWD: 1.38 CM
BH CV ECHO MEAS - MED PEAK E' VEL: 7.2 CM/SEC
BH CV ECHO MEAS - MV DEC SLOPE: 583.1 CM/SEC2
BH CV ECHO MEAS - MV DEC TIME: 0.18 MSEC
BH CV ECHO MEAS - MV E MAX VEL: 72 CM/SEC
BH CV ECHO MEAS - MV MAX PG: 3.3 MMHG
BH CV ECHO MEAS - MV MEAN PG: 1.58 MMHG
BH CV ECHO MEAS - MV P1/2T: 40.9 MSEC
BH CV ECHO MEAS - MV V2 VTI: 14.2 CM
BH CV ECHO MEAS - MVA(P1/2T): 5.4 CM2
BH CV ECHO MEAS - MVA(VTI): 2.11 CM2
BH CV ECHO MEAS - PA ACC TIME: 0.06 SEC
BH CV ECHO MEAS - PA PR(ACCEL): 51.7 MMHG
BH CV ECHO MEAS - PA V2 MAX: 57 CM/SEC
BH CV ECHO MEAS - PULM DIAS VEL: 27.1 CM/SEC
BH CV ECHO MEAS - PULM S/D: 0.68
BH CV ECHO MEAS - PULM SYS VEL: 18.3 CM/SEC
BH CV ECHO MEAS - QP/QS: 1.28
BH CV ECHO MEAS - RAP SYSTOLE: 3 MMHG
BH CV ECHO MEAS - RV MAX PG: 1.02 MMHG
BH CV ECHO MEAS - RV V1 MAX: 50.6 CM/SEC
BH CV ECHO MEAS - RV V1 VTI: 9.8 CM
BH CV ECHO MEAS - RVOT DIAM: 2.24 CM
BH CV ECHO MEAS - SI(MOD-SP2): 30.8 ML/M2
BH CV ECHO MEAS - SI(MOD-SP4): 31.9 ML/M2
BH CV ECHO MEAS - SUP REN AO DIAM: 2.2 CM
BH CV ECHO MEAS - SV(LVOT): 30.1 ML
BH CV ECHO MEAS - SV(MOD-SP2): 84 ML
BH CV ECHO MEAS - SV(MOD-SP4): 87 ML
BH CV ECHO MEAS - SV(RVOT): 38.6 ML
BH CV ECHO MEASUREMENTS AVERAGE E/E' RATIO: 9.41
LEFT ATRIUM VOLUME INDEX: 26.7 ML/M2
MAXIMAL PREDICTED HEART RATE: 164 BPM
SINUS: 2.7 CM
STJ: 3.1 CM
STRESS TARGET HR: 139 BPM

## 2023-02-02 PROCEDURE — 93306 TTE W/DOPPLER COMPLETE: CPT | Performed by: INTERNAL MEDICINE

## 2023-02-02 PROCEDURE — 25010000002 PERFLUTREN (DEFINITY) 8.476 MG IN SODIUM CHLORIDE (PF) 0.9 % 10 ML INJECTION: Performed by: INTERNAL MEDICINE

## 2023-02-02 PROCEDURE — 93306 TTE W/DOPPLER COMPLETE: CPT

## 2023-02-02 RX ADMIN — PERFLUTREN 2 ML: 6.52 INJECTION, SUSPENSION INTRAVENOUS at 07:35

## 2023-02-03 ENCOUNTER — TELEPHONE (OUTPATIENT)
Dept: PHARMACY | Facility: HOSPITAL | Age: 58
End: 2023-02-03
Payer: COMMERCIAL

## 2023-02-05 NOTE — PROGRESS NOTES
Please notify Catiaelina that most recent echo shows mildy reduced heart function likely form higher heart rate with afib. Can you ask how his BP and HR has been? There is pending Holter that I will review. Continue current care and fu with me in 3 months. Thanks

## 2023-02-06 ENCOUNTER — TELEPHONE (OUTPATIENT)
Dept: CARDIOLOGY | Facility: CLINIC | Age: 58
End: 2023-02-06
Payer: COMMERCIAL

## 2023-02-06 RX ORDER — POTASSIUM CHLORIDE 1500 MG/1
TABLET, FILM COATED, EXTENDED RELEASE ORAL
Qty: 5 TABLET | Refills: 0 | Status: SHIPPED | OUTPATIENT
Start: 2023-02-06

## 2023-02-06 RX ORDER — FUROSEMIDE 40 MG/1
TABLET ORAL
Qty: 5 TABLET | Refills: 0 | Status: SHIPPED | OUTPATIENT
Start: 2023-02-06

## 2023-02-06 NOTE — TELEPHONE ENCOUNTER
----- Message from Gavin Luke MD sent at 2/4/2023  8:55 PM EST -----  Please notify Milton that most recent echo shows mildy reduced heart function likely form higher heart rate with afib. Can you ask how his BP and HR has been? There is pending Holter that I will review. Continue current care and fu with me in 3 months. Thanks

## 2023-02-06 NOTE — TELEPHONE ENCOUNTER
Last OV 1/25/23.  No future OV.  Labs 6/16/22. It appears at last OV she was given only a quantity of 5, please advise. [facility] RMA

## 2023-02-06 NOTE — TELEPHONE ENCOUNTER
Notified patient of results/recommendations. Patient verbalized understanding. He said his HR has been in the 70s but can get up a little bit higher when he has panic attacks. BPs have been averaging in the 128 range. He said that his PCP did increase his lisinopril to 40mg.     Milagro Robles RN  Triage [facility]

## 2023-02-07 RX ORDER — WARFARIN SODIUM 6 MG/1
TABLET ORAL
Qty: 100 TABLET | Refills: 1 | Status: SHIPPED | OUTPATIENT
Start: 2023-02-07

## 2023-05-17 ENCOUNTER — OFFICE VISIT (OUTPATIENT)
Dept: CARDIOLOGY | Facility: CLINIC | Age: 58
End: 2023-05-17
Payer: COMMERCIAL

## 2023-05-17 VITALS
DIASTOLIC BLOOD PRESSURE: 84 MMHG | SYSTOLIC BLOOD PRESSURE: 132 MMHG | OXYGEN SATURATION: 100 % | BODY MASS INDEX: 46.65 KG/M2 | WEIGHT: 315 LBS | HEIGHT: 69 IN

## 2023-05-17 DIAGNOSIS — E66.01 MORBIDLY OBESE: ICD-10-CM

## 2023-05-17 DIAGNOSIS — I48.19 ATRIAL FIBRILLATION, PERSISTENT: Primary | ICD-10-CM

## 2023-05-17 DIAGNOSIS — I42.8 NICM (NONISCHEMIC CARDIOMYOPATHY): ICD-10-CM

## 2023-05-17 RX ORDER — POTASSIUM CHLORIDE 20 MEQ/1
20 TABLET, EXTENDED RELEASE ORAL DAILY
COMMUNITY
Start: 2023-03-04 | End: 2023-05-17 | Stop reason: ALTCHOICE

## 2023-05-17 NOTE — PROGRESS NOTES
PATIENTINFORMATION    Date of Office Visit: 2023  Encounter Provider: Gavin Luke MD  Place of Service: [facility] CARDIOLOGY  Patient Name: Milton Moody  : 1965    Subjective:     Encounter Date:2023      Patient ID: Milton Moody is a 57 y.o. male.    Chief Complaint   Patient presents with   • Atrial Fibrillation     HPI  Mr. Moody is a pleasant 57 years old gentleman who came to cardiology clinic for follow-up visit.  He had to go to urgent care clinic last week because of shortness of breath/nasal congestion and he was treated with antibiotics and steroids with significant improvement of symptoms.  He denies any chest pain, palpitations, presyncope or syncope or significant lower extremity swelling.    Compliant with current medications without significant side effects.      ROS  All systems reviewed and negative except as noted in HPI.    Past Medical History:   Diagnosis Date   • Abnormal ECG    • Asthma    • Atrial fibrillation    • Chest pain    • CKD (chronic kidney disease) stage 3, GFR 30-59 ml/min 3/4/2020   • Hyperlipidemia    • Hypertension    • NICM (nonischemic cardiomyopathy) 3/4/2020   • Nonischemic cardiomyopathy    • Palpitations    • Rapid heart rate    • Sleep apnea        Past Surgical History:   Procedure Laterality Date   • KNEE SURGERY Left    • SHOULDER ROTATOR CUFF REPAIR Right 2020       Social History     Socioeconomic History   • Marital status:    Tobacco Use   • Smoking status: Former     Packs/day: 0.10     Years: 3.00     Pack years: 0.30     Types: Cigarettes   • Smokeless tobacco: Former     Types: Snuff   Substance and Sexual Activity   • Alcohol use: Yes     Alcohol/week: 6.0 standard drinks     Types: 6 Cans of beer per week     Comment: weekend alcohol use   • Drug use: Not Currently     Types: Marijuana   • Sexual activity: Not Currently     Partners: Female       Family History   Problem Relation Age of Onset   •  "Hypertension Mother    • Hypertension Sister            ECG 12 Lead    Date/Time: 5/17/2023 12:37 PM  Performed by: Gavin Luke MD  Authorized by: Gavin Luke MD   Comparison: compared with previous ECG from 1/25/2023  Similar to previous ECG  Rhythm: atrial fibrillation  Rate: normal  Conduction: left bundle branch block  ST Segments: ST segments normal  T Waves: T waves normal  QRS axis: normal  Other: no other findings    Clinical impression: abnormal EKG               Objective:     /84   Ht 175.3 cm (69\")   Wt (!) 175 kg (386 lb)   SpO2 100%   BMI 57.00 kg/m²  Body mass index is 57 kg/m².     Constitutional:       General: Not in acute distress.     Appearance: Morbidly obese. Not diaphoretic.   Eyes:      Pupils: Pupils are equal, round, and reactive to light.   HENT:      Head: Normocephalic and atraumatic.   Neck:      Thyroid: No thyromegaly.   Pulmonary:      Effort: Pulmonary effort is normal. No respiratory distress.      Breath sounds: Normal breath sounds. No wheezing. No rales.   Chest:      Chest wall: Not tender to palpatation.   Cardiovascular:      Normal rate. Irregularly irregular rhythm.      No gallop.   Pulses:     Intact distal pulses.   Edema:     Peripheral edema absent.   Abdominal:      General: Bowel sounds are normal. There is no distension.      Palpations: Abdomen is soft.      Tenderness: There is no guarding.   Musculoskeletal: Normal range of motion.         General: No deformity.      Cervical back: Normal range of motion and neck supple. Skin:     General: Skin is warm and dry.      Findings: No rash.   Neurological:      Mental Status: Alert and oriented to person, place, and time.      Cranial Nerves: No cranial nerve deficit.      Deep Tendon Reflexes: Reflexes are normal and symmetric.   Psychiatric:         Judgment: Judgment normal.         Review Of Data: I have reviewed pertinent recent labs, images and documents and pertinent findings " included in HPI or assessment below.          Assessment/Plan:         1. Persistent atrial fibrillation on metoprolol and Coumadin  2. Non ischemic cardiomyopathy-Myocardial perfusion study on 5/7/2021 with no evidence for ischemia and left ventricular ejection fraction of 21%. Echo in May 2021 with estimated left ventricular ejection fraction of 41-45%. Repeat echo in 02/2023 with LVEF of  41-45%  3. Left bundle branch block unchanged/nonspecific intraventricular conduction delay on some of the EKGs  4. Hypertension-well-controlled on lisinopril and metoprolol  5. Morbid obesity with complications/obstructive sleep apnea on CPAP  6. Hypercholesterolemia-on a statin-panel at goal  7. Chronic kidney disease-stable creatinine   8. Pulmonary nodule-  follows up with pulmonary    No significant cardiac complaints today.  Euvolemic on exam on current diuretic regimen.  A-fib with good heart rate control.  Most recent INR within therapeutic range.  Blood pressure within range.  I am switching lisinopril to Entresto and he will continue to monitor blood pressure and heart rate at home.  BMP in 2 weeks.  Check dig level    From cardiology standpoint he is overall intermediate risk for perioperative cardiovascular complications under general anesthesia for carpal tunnel procedure because of underlying body weight and comorbidity.  No further testing needed and can proceed with surgery.  Continue metoprolol in the perioperative period including the morning of surgery.  Coumadin can be stopped 3-5 days prior to surgery and resume after surgery.    Return to clinic in 3 months.  Diagnosis and plan of care discussed with patient and verbalized understanding.            Your medication list          Accurate as of May 17, 2023 12:41 PM. If you have any questions, ask your nurse or doctor.            CONTINUE taking these medications      Instructions Last Dose Given Next Dose Due   atorvastatin 40 MG tablet  Commonly known as:  LIPITOR      Take 40 mg by mouth Every Night.       cyclobenzaprine 10 MG tablet  Commonly known as: FLEXERIL      Take 10 mg by mouth At Night As Needed.       digoxin 250 MCG tablet  Commonly known as: Lanoxin      Take 1 tablet by mouth Daily.       fish oil 1000 MG capsule capsule      Take  by mouth Daily With Breakfast.       fluticasone 50 MCG/ACT nasal spray  Commonly known as: FLONASE           furosemide 40 MG tablet  Commonly known as: LASIX      TAKE ONE TABLET BY MOUTH DAILY       lisinopril 40 MG tablet  Commonly known as: PRINIVIL,ZESTRIL      Take 40 mg by mouth Daily.       metoprolol tartrate 100 MG tablet  Commonly known as: LOPRESSOR      Take 1 tablet by mouth 2 (Two) Times a Day.       pantoprazole 40 MG EC tablet  Commonly known as: PROTONIX           Potassium 99 MG tablet      Take  by mouth.       potassium chloride ER 20 MEQ tablet controlled-release ER tablet  Commonly known as: K-TAB      TAKE ONE TABLET BY MOUTH TWICE A DAY       sennosides-docusate 8.6-50 MG per tablet  Commonly known as: PERICOLACE      Take 1 tablet by mouth Daily.       warfarin 6 MG tablet  Commonly known as: COUMADIN      Take one and one-half tablets (9 mg) by mouth on Tuesdays, and take one tablet (6 mg) by mouth all other days or as directed.                  Gavin Luke MD  05/17/23  12:41 EDT

## 2023-05-18 ENCOUNTER — TELEPHONE (OUTPATIENT)
Dept: PHARMACY | Facility: HOSPITAL | Age: 58
End: 2023-05-18
Payer: COMMERCIAL

## 2023-05-18 ENCOUNTER — ANTICOAGULATION VISIT (OUTPATIENT)
Dept: PHARMACY | Facility: HOSPITAL | Age: 58
End: 2023-05-18
Payer: COMMERCIAL

## 2023-05-18 DIAGNOSIS — Z79.01 ANTICOAGULATED ON COUMADIN: ICD-10-CM

## 2023-05-18 DIAGNOSIS — I48.91 ATRIAL FIBRILLATION WITH RAPID VENTRICULAR RESPONSE: Primary | ICD-10-CM

## 2023-05-18 LAB
INR PPP: 2.7 (ref 0.91–1.09)
PROTHROMBIN TIME: 32.8 SECONDS (ref 10–13.8)

## 2023-05-18 PROCEDURE — 36416 COLLJ CAPILLARY BLOOD SPEC: CPT

## 2023-05-18 PROCEDURE — 85610 PROTHROMBIN TIME: CPT

## 2023-05-18 NOTE — PROGRESS NOTES
Anticoagulation Clinic Progress Note    Anticoagulation Summary  As of 2023    INR goal:  2.0-3.0   TTR:  58.2 % (3.2 y)   INR used for dosin.7 (2023)   Warfarin maintenance plan:  9 mg every Tue; 6 mg all other days; Starting 2023   Weekly warfarin total:  45 mg   No change documented:  Marlene Mo, Pharmacy Technician   Plan last modified:  Rani Dhillon [facility] (2021)   Next INR check:  6/15/2023   Priority:  High   Target end date:  Indefinite    Indications    Atrial fibrillation with rapid ventricular response [I48.91]  Anticoagulated on Coumadin [Z79.01]             Anticoagulation Episode Summary     INR check location:      Preferred lab:      Send INR reminders to:  [facility] [place]    Comments:  New to warfarin 2/10; See  tele note for warfarin dosing hx. Afib + left atrial appendage thrombus at present; pending cardioversion upon resolution of CARRI thrombus.      Anticoagulation Care Providers     Provider Role Specialty Phone number    Gavin Luke MD Referring Cardiology [phone]          Clinic Interview:  Patient Findings     Positives:  Change in medications    Negatives:  Signs/symptoms of thrombosis, Signs/symptoms of bleeding,   Laboratory test error suspected, Change in health, Change in alcohol use,   Change in activity, Upcoming invasive procedure, Emergency department   visit, Upcoming dental procedure, Missed doses, Extra doses, Change in   diet/appetite, Hospital admission, Bruising, Other complaints    Comments:  Stopped Lisinopril, started Metoprolol on       Clinical Outcomes     Negatives:  Major bleeding event, Thromboembolic event,   Anticoagulation-related hospital admission, Anticoagulation-related ED   visit, Anticoagulation-related fatality    Comments:  Stopped Lisinopril, started Metoprolol on         INR History:      6/15/2022    10:15 AM 2022     9:45 AM 2022     1:30 PM 2022     8:30 AM  12/21/2022     3:41 PM 1/10/2023    10:45 AM 5/18/2023     1:00 PM   Anticoagulation Monitoring   INR 2.8 2.3 2.2 1.7  1.5 2.7   INR Date 6/15/2022 7/13/2022 9/6/2022 11/28/2022  1/10/2023 5/18/2023   INR Goal 2.0-3.0 2.0-3.0 2.0-3.0 2.0-3.0 2.0-3.0 2.0-3.0 2.0-3.0   Trend Same Same Same Same  Same Same   Last Week Total 45 mg 45 mg 45 mg 45 mg  33 mg 45 mg   Next Week Total 45 mg 45 mg 45 mg 48 mg  48 mg 45 mg   Sun 6 mg 6 mg 6 mg 6 mg  6 mg 6 mg   Mon 6 mg 6 mg 6 mg 9 mg (11/28); Otherwise 6 mg  6 mg 6 mg   Tue 9 mg 9 mg 9 mg 9 mg  12 mg (1/10); Otherwise 9 mg 9 mg   Wed 6 mg 6 mg 6 mg 6 mg  6 mg 6 mg   Thu 6 mg 6 mg 6 mg 6 mg  6 mg 6 mg   Fri 6 mg 6 mg 6 mg 6 mg  6 mg 6 mg   Sat 6 mg 6 mg 6 mg 6 mg  6 mg 6 mg       Plan:  1. INR is therapeutic today- see above in Anticoagulation Summary.   Will instruct Milton Moody to continue their warfarin regimen- see above in Anticoagulation Summary.  2. Follow up in 4 weeks.  3. Patient declines warfarin refills.  4. Verbal and written information provided. Patient expresses understanding and has no further questions at this time.    Marlene Mo, Pharmacy Technician

## 2023-05-31 ENCOUNTER — LAB (OUTPATIENT)
Dept: LAB | Facility: HOSPITAL | Age: 58
End: 2023-05-31

## 2023-05-31 DIAGNOSIS — I48.91 ATRIAL FIBRILLATION WITH RAPID VENTRICULAR RESPONSE: ICD-10-CM

## 2023-05-31 DIAGNOSIS — I48.19 ATRIAL FIBRILLATION, PERSISTENT: ICD-10-CM

## 2023-05-31 LAB
ANION GAP SERPL CALCULATED.3IONS-SCNC: 9 MMOL/L (ref 5–15)
BUN SERPL-MCNC: 11 MG/DL (ref 6–20)
BUN/CREAT SERPL: 8.7 (ref 7–25)
CALCIUM SPEC-SCNC: 8.9 MG/DL (ref 8.6–10.5)
CHLORIDE SERPL-SCNC: 102 MMOL/L (ref 98–107)
CO2 SERPL-SCNC: 28 MMOL/L (ref 22–29)
CREAT SERPL-MCNC: 1.26 MG/DL (ref 0.76–1.27)
DIGOXIN SERPL-MCNC: 0.6 NG/ML (ref 0.6–1.2)
EGFRCR SERPLBLD CKD-EPI 2021: 66.9 ML/MIN/1.73
GLUCOSE SERPL-MCNC: 96 MG/DL (ref 65–99)
HOLD SPECIMEN: NORMAL
POTASSIUM SERPL-SCNC: 4.3 MMOL/L (ref 3.5–5.2)
SODIUM SERPL-SCNC: 139 MMOL/L (ref 136–145)
WHOLE BLOOD HOLD SPECIMEN: NORMAL

## 2023-05-31 PROCEDURE — 36415 COLL VENOUS BLD VENIPUNCTURE: CPT

## 2023-05-31 PROCEDURE — 80048 BASIC METABOLIC PNL TOTAL CA: CPT

## 2023-05-31 PROCEDURE — 80162 ASSAY OF DIGOXIN TOTAL: CPT

## 2023-06-01 ENCOUNTER — TELEPHONE (OUTPATIENT)
Dept: CARDIOLOGY | Facility: CLINIC | Age: 58
End: 2023-06-01

## 2023-06-01 NOTE — TELEPHONE ENCOUNTER
Please inform patient her lab work has been reviewed.  Entresto is working well with her kidney so she will continue.  Digoxin level is also within normal limits so her medications will remain the same until her next follow-up.

## 2023-06-01 NOTE — TELEPHONE ENCOUNTER
I spoke with Milton Moody and updated pt on results/recommendations from provider.  Pt verbalized understanding and has no further questions at this time.    Thank you,    Felipa Marquez, RN  Triage [facility]  06/01/23 09:43 EDT

## 2023-06-01 NOTE — TELEPHONE ENCOUNTER
Left voicemail for Milton Moody requesting callback.    Thank you,  Beatrice CASTANEDA RN  [place] Nurse [facility] [place]   09:32 EDT

## 2023-06-15 ENCOUNTER — ANTICOAGULATION VISIT (OUTPATIENT)
Dept: PHARMACY | Facility: HOSPITAL | Age: 58
End: 2023-06-15
Payer: COMMERCIAL

## 2023-06-15 DIAGNOSIS — Z79.01 ANTICOAGULATED ON COUMADIN: ICD-10-CM

## 2023-06-15 DIAGNOSIS — I48.91 ATRIAL FIBRILLATION WITH RAPID VENTRICULAR RESPONSE: Primary | ICD-10-CM

## 2023-06-15 LAB
INR PPP: 2.7 (ref 0.91–1.09)
PROTHROMBIN TIME: 32.1 SECONDS (ref 10–13.8)

## 2023-06-15 PROCEDURE — 85610 PROTHROMBIN TIME: CPT

## 2023-06-15 PROCEDURE — 36416 COLLJ CAPILLARY BLOOD SPEC: CPT

## 2023-06-15 NOTE — PROGRESS NOTES
Anticoagulation Clinic Progress Note    Anticoagulation Summary  As of 6/15/2023    INR goal:  2.0-3.0   TTR:  59.2 % (3.3 y)   INR used for dosin.7 (6/15/2023)   Warfarin maintenance plan:  9 mg every Tue; 6 mg all other days; Starting 6/15/2023   Weekly warfarin total:  45 mg   No change documented:  Jimi Reid, PharmD   Plan last modified:  [facility]ani Dhillon [facility] (2021)   Next INR check:  2023   Priority:  High   Target end date:  Indefinite    Indications    Atrial fibrillation with rapid ventricular response [I48.91]  Anticoagulated on Coumadin [Z79.01]             Anticoagulation Episode Summary     INR check location:      Preferred lab:      Send INR reminders to:   [facility] [place]    Comments:  New to warfarin 2/10; See  tele note for warfarin dosing hx. Afib + left atrial appendage thrombus at present; pending cardioversion upon resolution of CARRI thrombus.      Anticoagulation Care Providers     Provider Role Specialty Phone number    Gavin Luke MD Referring Cardiology [phone]          Clinic Interview:  Patient Findings     Negatives:  Signs/symptoms of thrombosis, Signs/symptoms of bleeding,   Laboratory test error suspected, Change in health, Change in alcohol use,   Change in activity, Upcoming invasive procedure, Emergency department   visit, Upcoming dental procedure, Missed doses, Extra doses, Change in   medications, Change in diet/appetite, Hospital admission, Bruising, Other   complaints      Clinical Outcomes     Negatives:  Major bleeding event, Thromboembolic event,   Anticoagulation-related hospital admission, Anticoagulation-related ED   visit, Anticoagulation-related fatality        INR History:      2022     9:45 AM 2022     1:30 PM 2022     8:30 AM 2022     3:41 PM 1/10/2023    10:45 AM 2023     1:00 PM 6/15/2023    10:15 AM   Anticoagulation Monitoring   INR 2.3 2.2 1.7  1.5 2.7 2.7   INR Date 2022  11/28/2022  1/10/2023 5/18/2023 6/15/2023   INR Goal 2.0-3.0 2.0-3.0 2.0-3.0 2.0-3.0 2.0-3.0 2.0-3.0 2.0-3.0   Trend Same Same Same  Same Same Same   Last Week Total 45 mg 45 mg 45 mg  33 mg 45 mg 45 mg   Next Week Total 45 mg 45 mg 48 mg  48 mg 45 mg 45 mg   Sun 6 mg 6 mg 6 mg  6 mg 6 mg 6 mg   Mon 6 mg 6 mg 9 mg (11/28); Otherwise 6 mg  6 mg 6 mg 6 mg   Tue 9 mg 9 mg 9 mg  12 mg (1/10); Otherwise 9 mg 9 mg 9 mg   Wed 6 mg 6 mg 6 mg  6 mg 6 mg 6 mg   Thu 6 mg 6 mg 6 mg  6 mg 6 mg 6 mg   Fri 6 mg 6 mg 6 mg  6 mg 6 mg 6 mg   Sat 6 mg 6 mg 6 mg  6 mg 6 mg 6 mg       Plan:  1. INR is Therapeutic today- see above in Anticoagulation Summary.  Will instruct Milton Moody to Continue their warfarin regimen- see above in Anticoagulation Summary.  2. Follow up in 4 weeks  3. Patient declines warfarin refills.  4. Verbal and written information provided. Patient expresses understanding and has no further questions at this time.    Jimi Reid, PharmD

## 2023-07-31 RX ORDER — WARFARIN SODIUM 6 MG/1
TABLET ORAL
Qty: 100 TABLET | Refills: 1 | Status: SHIPPED | OUTPATIENT
Start: 2023-07-31

## 2023-08-01 RX ORDER — POTASSIUM CHLORIDE 1500 MG/1
TABLET, EXTENDED RELEASE ORAL
Qty: 60 TABLET | Refills: 3 | Status: SHIPPED | OUTPATIENT
Start: 2023-08-01

## 2023-08-03 RX ORDER — FUROSEMIDE 40 MG/1
TABLET ORAL
Qty: 5 TABLET | Refills: 0 | Status: SHIPPED | OUTPATIENT
Start: 2023-08-03

## 2023-08-17 ENCOUNTER — TELEPHONE (OUTPATIENT)
Dept: PHARMACY | Facility: HOSPITAL | Age: 58
End: 2023-08-17
Payer: COMMERCIAL

## 2023-08-17 RX ORDER — FUROSEMIDE 40 MG/1
TABLET ORAL
Qty: 5 TABLET | Refills: 0 | Status: SHIPPED | OUTPATIENT
Start: 2023-08-17

## 2023-08-17 NOTE — TELEPHONE ENCOUNTER
Last OV 5/17/23.  Next OV 8/23/23.  Labs 5/31/23.  Can you advise on this rx?  [facility] JONATHAN

## 2023-08-18 ENCOUNTER — ANTICOAGULATION VISIT (OUTPATIENT)
Dept: PHARMACY | Facility: HOSPITAL | Age: 58
End: 2023-08-18
Payer: COMMERCIAL

## 2023-08-18 DIAGNOSIS — I48.91 ATRIAL FIBRILLATION WITH RAPID VENTRICULAR RESPONSE: Primary | ICD-10-CM

## 2023-08-18 DIAGNOSIS — Z79.01 ANTICOAGULATED ON COUMADIN: ICD-10-CM

## 2023-08-18 LAB
INR PPP: 2.5 (ref 0.91–1.09)
PROTHROMBIN TIME: 30.5 SECONDS (ref 10–13.8)

## 2023-08-18 PROCEDURE — 36416 COLLJ CAPILLARY BLOOD SPEC: CPT

## 2023-08-18 PROCEDURE — 85610 PROTHROMBIN TIME: CPT

## 2023-08-18 NOTE — PROGRESS NOTES
Anticoagulation Clinic Progress Note    Anticoagulation Summary  As of 2023      INR goal:  2.0-3.0   TTR:  61.3 % (3.5 y)   INR used for dosin.5 (2023)   Warfarin maintenance plan:  9 mg every Tue; 6 mg all other days   Weekly warfarin total:  45 mg   No change documented:  Joe Mane, Pharmacy Intern   Plan last modified:  Rani Dhillon [facility] (2021)   Next INR check:  2023   Priority:  High   Target end date:  Indefinite    Indications    Atrial fibrillation with rapid ventricular response [I48.91]  Anticoagulated on Coumadin [Z79.01]                 Anticoagulation Episode Summary       INR check location:      Preferred lab:      Send INR reminders to:   JONAH DIAMOND [facility]    Comments:  New to warfarin 2/10; See  tele note for warfarin dosing hx. Afib + left atrial appendage thrombus at present; pending cardioversion upon resolution of CARRI thrombus.          Anticoagulation Care Providers       Provider Role Specialty Phone number    Gavin Luke MD Referring Cardiology [phone]            Clinic Interview:  Patient Findings     Negatives:  Signs/symptoms of thrombosis, Signs/symptoms of bleeding,   Laboratory test error suspected, Change in health, Change in alcohol use,   Change in activity, Upcoming invasive procedure, Emergency department   visit, Upcoming dental procedure, Missed doses, Extra doses, Change in   medications, Change in diet/appetite, Hospital admission, Bruising, Other   complaints      Clinical Outcomes     Negatives:  Major bleeding event, Thromboembolic event,   Anticoagulation-related hospital admission, Anticoagulation-related ED   visit, Anticoagulation-related fatality        INR History:      2022     1:30 PM 2022     8:30 AM 2022     3:41 PM 1/10/2023    10:45 AM 2023     1:00 PM 6/15/2023    10:15 AM 2023    10:15 AM   Anticoagulation Monitoring   INR 2.2 1.7  1.5 2.7 2.7 2.5   INR Date 2022  11/28/2022  1/10/2023 5/18/2023 6/15/2023 8/18/2023   INR Goal 2.0-3.0 2.0-3.0 2.0-3.0 2.0-3.0 2.0-3.0 2.0-3.0 2.0-3.0   Trend Same Same  Same Same Same Same   Last Week Total 45 mg 45 mg  33 mg 45 mg 45 mg 45 mg   Next Week Total 45 mg 48 mg  48 mg 45 mg 45 mg 45 mg   Sun 6 mg 6 mg  6 mg 6 mg 6 mg 6 mg   Mon 6 mg 9 mg (11/28); Otherwise 6 mg  6 mg 6 mg 6 mg 6 mg   Tue 9 mg 9 mg  12 mg (1/10); Otherwise 9 mg 9 mg 9 mg 9 mg   Wed 6 mg 6 mg  6 mg 6 mg 6 mg 6 mg   Thu 6 mg 6 mg  6 mg 6 mg 6 mg 6 mg   Fri 6 mg 6 mg  6 mg 6 mg 6 mg 6 mg   Sat 6 mg 6 mg  6 mg 6 mg 6 mg 6 mg       Plan:  1. INR is therapeutic today- see above in Anticoagulation Summary.   Will instruct Milton Moody to continue their warfarin regimen- see above in Anticoagulation Summary.  2. Follow up in 2 weeks.  3. Patient declines warfarin refills.  4. Verbal and written information provided. Patient expresses understanding and has no further questions at this time.    Joe Mane, Pharmacy Intern

## 2023-08-22 RX ORDER — FUROSEMIDE 40 MG/1
TABLET ORAL
Qty: 5 TABLET | Refills: 0 | Status: SHIPPED | OUTPATIENT
Start: 2023-08-22

## 2023-08-23 ENCOUNTER — OFFICE VISIT (OUTPATIENT)
Dept: CARDIOLOGY | Facility: CLINIC | Age: 58
End: 2023-08-23
Payer: COMMERCIAL

## 2023-08-23 VITALS
WEIGHT: 315 LBS | DIASTOLIC BLOOD PRESSURE: 82 MMHG | HEIGHT: 69 IN | HEART RATE: 90 BPM | OXYGEN SATURATION: 99 % | SYSTOLIC BLOOD PRESSURE: 110 MMHG | BODY MASS INDEX: 46.65 KG/M2

## 2023-08-23 DIAGNOSIS — I42.8 NICM (NONISCHEMIC CARDIOMYOPATHY): ICD-10-CM

## 2023-08-23 DIAGNOSIS — R06.02 EXERTIONAL SHORTNESS OF BREATH: ICD-10-CM

## 2023-08-23 DIAGNOSIS — I48.19 ATRIAL FIBRILLATION, PERSISTENT: Primary | ICD-10-CM

## 2023-08-23 PROCEDURE — 93000 ELECTROCARDIOGRAM COMPLETE: CPT | Performed by: INTERNAL MEDICINE

## 2023-08-23 PROCEDURE — 99214 OFFICE O/P EST MOD 30 MIN: CPT | Performed by: INTERNAL MEDICINE

## 2023-08-23 RX ORDER — METOPROLOL SUCCINATE 100 MG/1
50 TABLET, EXTENDED RELEASE ORAL 2 TIMES DAILY
Qty: 180 TABLET | Refills: 3 | Status: SHIPPED | OUTPATIENT
Start: 2023-08-23

## 2023-08-23 NOTE — PROGRESS NOTES
PATIENTINFORMATION    Date of Office Visit: 2023  Encounter Provider: Gavin Luke MD  Place of Service: [facility] CARDIOLOGY  Patient Name: Milton Moody  : 1965    Subjective:     Encounter Date:2023      Patient ID: Milton Moody is a 57 y.o. male.    Chief Complaint   Patient presents with    Atrial Fibrillation     HPI  Mr. Moody is a pleasant 57 years old gentleman who came to cardiology clinic for follow-up visit.  He reports getting increasingly short winded lately and having some nighttime wheezing for the past few weeks.  He denies any chest pain, palpitations, presyncope or syncope.  Is gained some weight since last clinic visit.  He reports being compliant with current medications and putting out a lot of urine with Lasix.  Denies bleeding from any site.      ROS  All systems reviewed and negative except as noted in HPI.    Past Medical History:   Diagnosis Date    Abnormal ECG     Asthma     Atrial fibrillation     Chest pain     CKD (chronic kidney disease) stage 3, GFR 30-59 ml/min 3/4/2020    Hyperlipidemia     Hypertension     NICM (nonischemic cardiomyopathy) 3/4/2020    Nonischemic cardiomyopathy     Palpitations     Rapid heart rate     Sleep apnea        Past Surgical History:   Procedure Laterality Date    KNEE SURGERY Left 2019    SHOULDER ROTATOR CUFF REPAIR Right 2020       Social History     Socioeconomic History    Marital status:    Tobacco Use    Smoking status: Former     Packs/day: 0.10     Years: 3.00     Pack years: 0.30     Types: Cigarettes    Smokeless tobacco: Former     Types: Snuff   Substance and Sexual Activity    Alcohol use: Yes     Alcohol/week: 6.0 standard drinks     Types: 6 Cans of beer per week     Comment: weekend alcohol use    Drug use: Not Currently     Types: Marijuana    Sexual activity: Not Currently     Partners: Female       Family History   Problem Relation Age of Onset    Hypertension Mother      "Hypertension Sister            ECG 12 Lead    Date/Time: 8/23/2023 11:46 AM  Performed by: Gavin Luke MD  Authorized by: Gavin Luke MD   Comparison: compared with previous ECG from 5/17/2023  Similar to previous ECG  Rhythm: atrial fibrillation  Rate: normal  Conduction: non-specific intraventricular conduction delay  ST Segments: ST segments normal  T Waves: T waves normal  QRS axis: normal  Other: no other findings    Clinical impression: abnormal EKG           Objective:     /82 (BP Location: Left arm, Patient Position: Sitting, Cuff Size: Large Adult)   Pulse 90   Ht 175.3 cm (69\")   Wt (!) 178 kg (393 lb)   SpO2 99%   BMI 58.04 kg/mý  Body mass index is 58.04 kg/mý.     Constitutional:       General: Not in acute distress.     Appearance: Morbidly obese. Not diaphoretic.   Eyes:      Pupils: Pupils are equal, round, and reactive to light.   HENT:      Head: Normocephalic and atraumatic.   Neck:      Thyroid: No thyromegaly.   Pulmonary:      Effort: Pulmonary effort is normal. No respiratory distress.      Breath sounds: Normal breath sounds. No wheezing. No rales.   Chest:      Chest wall: Not tender to palpatation.   Cardiovascular:      Normal rate. Irregularly irregular rhythm.      No gallop.    Pulses:     Intact distal pulses.   Edema:     Peripheral edema absent.   Abdominal:      General: Bowel sounds are normal. There is no distension.      Palpations: Abdomen is soft.      Tenderness: There is no guarding.   Musculoskeletal: Normal range of motion.         General: No deformity.      Cervical back: Normal range of motion and neck supple. Skin:     General: Skin is warm and dry.      Findings: No rash.   Neurological:      Mental Status: Alert and oriented to person, place, and time.      Cranial Nerves: No cranial nerve deficit.      Deep Tendon Reflexes: Reflexes are normal and symmetric.   Psychiatric:         Judgment: Judgment normal.       Review Of Data: I have " reviewed pertinent recent labs, images and documents and pertinent findings included in HPI or assessment below.          Assessment/Plan:         Persistent atrial fibrillation on metoprolol and Coumadin  Non ischemic cardiomyopathy-Myocardial perfusion study on 5/7/2021 with no evidence for ischemia and left ventricular ejection fraction of 21%. Echo in May 2021 with estimated left ventricular ejection fraction of 41-45%. Repeat echo in 02/2023 with LVEF of  41-45%.  Currently on metoprolol tartrate and Entresto tolerating well.  Left bundle branch block unchanged/nonspecific intraventricular conduction delay on some of the EKGs  Hypertension-well-controlled on lisinopril and metoprolol  Morbid obesity with complications/obstructive sleep apnea on CPAP  Hypercholesterolemia-on a statin-panel at goal  Chronic kidney disease-stable creatinine   Pulmonary nodule-  follows up with pulmonary  Degenerative joint disease and prior surgery      Mr. Moody complaining of worsening shortness of breath.  On exam not overtly volume overloaded but his body habitus prevents accurate assessment.  Check proBNP, BMP, dig level.  Repeat echocardiogram as he has been on guideline directed medical therapy for the past 3 months with Entresto.  I have changed metoprolol to tartrate to metoprolol succinate.          Diagnosis and plan of care discussed with patient and verbalized understanding.            Your medication list            Accurate as of August 23, 2023 11:45 AM. If you have any questions, ask your nurse or doctor.                START taking these medications        Instructions Last Dose Given Next Dose Due   metoprolol succinate  MG 24 hr tablet  Commonly known as: TOPROL-XL  Started by: Gavin Luke MD      Take 0.5 tablets by mouth 2 (Two) Times a Day.              CONTINUE taking these medications        Instructions Last Dose Given Next Dose Due   atorvastatin 40 MG tablet  Commonly known as: LIPITOR       Take 1 tablet by mouth Every Night.       cyclobenzaprine 10 MG tablet  Commonly known as: FLEXERIL      Take 1 tablet by mouth At Night As Needed.       digoxin 250 MCG tablet  Commonly known as: Lanoxin      Take 1 tablet by mouth Daily.       Entresto 49-51 MG tablet  Generic drug: sacubitril-valsartan      Take 1 tablet by mouth 2 (Two) Times a Day.       fish oil 1000 MG capsule capsule      Take  by mouth Daily With Breakfast.       fluticasone 50 MCG/ACT nasal spray  Commonly known as: FLONASE           furosemide 40 MG tablet  Commonly known as: LASIX      TAKE 1 TABLET BY MOUTH DAILY       metFORMIN 500 MG tablet  Commonly known as: GLUCOPHAGE      Take 1 tablet by mouth 2 (Two) Times a Day.       pantoprazole 40 MG EC tablet  Commonly known as: PROTONIX           potassium chloride ER 20 MEQ tablet controlled-release ER tablet  Commonly known as: K-TAB      TAKE ONE TABLET BY MOUTH TWICE A DAY       Semaglutide(0.25 or 0.5MG/DOS) 2 MG/1.5ML solution pen-injector  Commonly known as: OZEMPIC      Inject 0.25 mg under the skin into the appropriate area as directed.       sennosides-docusate 8.6-50 MG per tablet  Commonly known as: PERICOLACE      Take 1 tablet by mouth Daily.       warfarin 6 MG tablet  Commonly known as: COUMADIN      TAKE 1 AND 1/2 TABLETS (9MG)  BY MOUTH ON TUESDAYS, AND TAKE 1 TABLET  (6 MG) ALL OTHER DAYS OR AS DIRECTED              STOP taking these medications      metoprolol tartrate 100 MG tablet  Commonly known as: LOPRESSOR  Stopped by: Gavin Luke MD                  Where to Get Your Medications        These medications were sent to [facility] [id number] - [place] [address] [facility] AT [address] [place] [address] [phone] [facility] [phone]   [address] [place] [address]      Phone: [phone]   metoprolol succinate  MG 24 hr tablet             Gavin Luke MD  08/23/23  11:45 EDT

## 2023-08-24 ENCOUNTER — LAB (OUTPATIENT)
Dept: LAB | Facility: HOSPITAL | Age: 58
End: 2023-08-24
Payer: COMMERCIAL

## 2023-08-24 DIAGNOSIS — R06.02 EXERTIONAL SHORTNESS OF BREATH: ICD-10-CM

## 2023-08-24 DIAGNOSIS — I48.19 ATRIAL FIBRILLATION, PERSISTENT: ICD-10-CM

## 2023-08-24 LAB
ANION GAP SERPL CALCULATED.3IONS-SCNC: 12 MMOL/L (ref 5–15)
BUN SERPL-MCNC: 14 MG/DL (ref 6–20)
BUN/CREAT SERPL: 10.2 (ref 7–25)
CALCIUM SPEC-SCNC: 9.2 MG/DL (ref 8.6–10.5)
CHLORIDE SERPL-SCNC: 100 MMOL/L (ref 98–107)
CO2 SERPL-SCNC: 25 MMOL/L (ref 22–29)
CREAT SERPL-MCNC: 1.37 MG/DL (ref 0.76–1.27)
DIGOXIN SERPL-MCNC: 0.5 NG/ML (ref 0.6–1.2)
EGFRCR SERPLBLD CKD-EPI 2021: 60.5 ML/MIN/1.73
GLUCOSE SERPL-MCNC: 136 MG/DL (ref 65–99)
NT-PROBNP SERPL-MCNC: 175 PG/ML (ref 0–900)
POTASSIUM SERPL-SCNC: 3.9 MMOL/L (ref 3.5–5.2)
SODIUM SERPL-SCNC: 137 MMOL/L (ref 136–145)

## 2023-08-24 PROCEDURE — 80162 ASSAY OF DIGOXIN TOTAL: CPT

## 2023-08-24 PROCEDURE — 36415 COLL VENOUS BLD VENIPUNCTURE: CPT

## 2023-08-24 PROCEDURE — 80048 BASIC METABOLIC PNL TOTAL CA: CPT

## 2023-08-24 PROCEDURE — 83880 ASSAY OF NATRIURETIC PEPTIDE: CPT

## 2023-09-08 ENCOUNTER — HOSPITAL ENCOUNTER (OUTPATIENT)
Dept: CARDIOLOGY | Facility: HOSPITAL | Age: 58
Discharge: HOME | End: 2023-09-08
Payer: COMMERCIAL

## 2023-09-08 VITALS
HEIGHT: 69 IN | HEART RATE: 78 BPM | WEIGHT: 315 LBS | SYSTOLIC BLOOD PRESSURE: 118 MMHG | DIASTOLIC BLOOD PRESSURE: 80 MMHG | BODY MASS INDEX: 46.65 KG/M2

## 2023-09-08 DIAGNOSIS — R06.02 EXERTIONAL SHORTNESS OF BREATH: ICD-10-CM

## 2023-09-08 LAB
AORTIC ARCH: 2.6 CM
ASCENDING AORTA: 2.7 CM
BH CV ECHO MEAS - ACS: 1.94 CM
BH CV ECHO MEAS - AO MAX PG: 5.3 MMHG
BH CV ECHO MEAS - AO MEAN PG: 3 MMHG
BH CV ECHO MEAS - AO ROOT DIAM: 2.8 CM
BH CV ECHO MEAS - AO V2 MAX: 114.7 CM/SEC
BH CV ECHO MEAS - AO V2 VTI: 19.2 CM
BH CV ECHO MEAS - AVA(I,D): 2.03 CM2
BH CV ECHO MEAS - EDV(CUBED): 148.9 ML
BH CV ECHO MEAS - EDV(MOD-SP2): 231 ML
BH CV ECHO MEAS - EDV(MOD-SP4): 176 ML
BH CV ECHO MEAS - EF(MOD-BP): 41.9 %
BH CV ECHO MEAS - EF(MOD-SP2): 41.6 %
BH CV ECHO MEAS - EF(MOD-SP4): 36.9 %
BH CV ECHO MEAS - ESV(CUBED): 45.9 ML
BH CV ECHO MEAS - ESV(MOD-SP2): 135 ML
BH CV ECHO MEAS - ESV(MOD-SP4): 111 ML
BH CV ECHO MEAS - FS: 32.5 %
BH CV ECHO MEAS - IVS/LVPW: 0.79 CM
BH CV ECHO MEAS - IVSD: 1.1 CM
BH CV ECHO MEAS - LAT PEAK E' VEL: 9.2 CM/SEC
BH CV ECHO MEAS - LV DIASTOLIC VOL/BSA (35-75): 63.9 CM2
BH CV ECHO MEAS - LV MASS(C)D: 271.6 GRAMS
BH CV ECHO MEAS - LV MAX PG: 1.76 MMHG
BH CV ECHO MEAS - LV MEAN PG: 1 MMHG
BH CV ECHO MEAS - LV SYSTOLIC VOL/BSA (12-30): 40.3 CM2
BH CV ECHO MEAS - LV V1 MAX: 66.4 CM/SEC
BH CV ECHO MEAS - LV V1 VTI: 10.6 CM
BH CV ECHO MEAS - LVIDD: 5.3 CM
BH CV ECHO MEAS - LVIDS: 3.6 CM
BH CV ECHO MEAS - LVOT AREA: 3.7 CM2
BH CV ECHO MEAS - LVOT DIAM: 2.17 CM
BH CV ECHO MEAS - LVPWD: 1.4 CM
BH CV ECHO MEAS - MED PEAK E' VEL: 11.7 CM/SEC
BH CV ECHO MEAS - MV DEC SLOPE: 497.3 CM/SEC2
BH CV ECHO MEAS - MV DEC TIME: 0.23 MSEC
BH CV ECHO MEAS - MV E MAX VEL: 78.1 CM/SEC
BH CV ECHO MEAS - MV MAX PG: 2.8 MMHG
BH CV ECHO MEAS - MV MEAN PG: 1.58 MMHG
BH CV ECHO MEAS - MV P1/2T: 49.1 MSEC
BH CV ECHO MEAS - MV V2 VTI: 12 CM
BH CV ECHO MEAS - MVA(P1/2T): 4.5 CM2
BH CV ECHO MEAS - MVA(VTI): 3.3 CM2
BH CV ECHO MEAS - PA ACC TIME: 0.09 SEC
BH CV ECHO MEAS - PA V2 MAX: 126.2 CM/SEC
BH CV ECHO MEAS - PULM DIAS VEL: 31.6 CM/SEC
BH CV ECHO MEAS - PULM S/D: 0.8
BH CV ECHO MEAS - PULM SYS VEL: 25.4 CM/SEC
BH CV ECHO MEAS - QP/QS: 1.3
BH CV ECHO MEAS - RV MAX PG: 2.7 MMHG
BH CV ECHO MEAS - RV V1 MAX: 82.3 CM/SEC
BH CV ECHO MEAS - RV V1 VTI: 15 CM
BH CV ECHO MEAS - RVOT DIAM: 2.08 CM
BH CV ECHO MEAS - SI(MOD-SP2): 34.9 ML/M2
BH CV ECHO MEAS - SI(MOD-SP4): 23.6 ML/M2
BH CV ECHO MEAS - SUP REN AO DIAM: 3.3 CM
BH CV ECHO MEAS - SV(LVOT): 39.1 ML
BH CV ECHO MEAS - SV(MOD-SP2): 96 ML
BH CV ECHO MEAS - SV(MOD-SP4): 65 ML
BH CV ECHO MEAS - SV(RVOT): 50.7 ML
BH CV ECHO MEASUREMENTS AVERAGE E/E' RATIO: 7.47
LEFT ATRIUM VOLUME INDEX: 27.2 ML/M2
SINUS: 2.29 CM
STJ: 2.7 CM

## 2023-09-08 PROCEDURE — 25510000001 PERFLUTREN (DEFINITY) 8.476 MG IN SODIUM CHLORIDE (PF) 0.9 % 10 ML INJECTION: Performed by: INTERNAL MEDICINE

## 2023-09-08 PROCEDURE — 93306 TTE W/DOPPLER COMPLETE: CPT

## 2023-09-08 RX ADMIN — PERFLUTREN 2 ML: 6.52 INJECTION, SUSPENSION INTRAVENOUS at 12:57

## 2023-09-11 DIAGNOSIS — I48.11 LONGSTANDING PERSISTENT ATRIAL FIBRILLATION: ICD-10-CM

## 2023-09-11 DIAGNOSIS — I42.8 NICM (NONISCHEMIC CARDIOMYOPATHY): Primary | ICD-10-CM

## 2023-09-19 RX ORDER — METOPROLOL SUCCINATE 100 MG/1
50 TABLET, EXTENDED RELEASE ORAL 2 TIMES DAILY
Qty: 180 TABLET | Refills: 3 | Status: SHIPPED | OUTPATIENT
Start: 2023-09-19 | End: 2023-09-25 | Stop reason: SDUPTHER

## 2023-09-19 RX ORDER — SACUBITRIL AND VALSARTAN 49; 51 MG/1; MG/1
1 TABLET, FILM COATED ORAL 2 TIMES DAILY
Qty: 180 TABLET | Refills: 3 | Status: SHIPPED | OUTPATIENT
Start: 2023-09-19 | End: 2023-09-25 | Stop reason: SDUPTHER

## 2023-09-25 RX ORDER — METOPROLOL SUCCINATE 100 MG/1
50 TABLET, EXTENDED RELEASE ORAL 2 TIMES DAILY
Qty: 180 TABLET | Refills: 3 | Status: SHIPPED | OUTPATIENT
Start: 2023-09-25

## 2023-09-25 RX ORDER — SACUBITRIL AND VALSARTAN 49; 51 MG/1; MG/1
1 TABLET, FILM COATED ORAL 2 TIMES DAILY
Qty: 180 TABLET | Refills: 3 | Status: SHIPPED | OUTPATIENT
Start: 2023-09-25

## 2023-10-10 ENCOUNTER — TELEPHONE (OUTPATIENT)
Dept: CARDIOLOGY | Facility: CLINIC | Age: 58
End: 2023-10-10

## 2023-10-10 ENCOUNTER — ANTICOAGULATION VISIT (OUTPATIENT)
Dept: PHARMACY | Facility: HOSPITAL | Age: 58
End: 2023-10-10
Payer: COMMERCIAL

## 2023-10-10 DIAGNOSIS — Z79.01 ANTICOAGULATED ON COUMADIN: ICD-10-CM

## 2023-10-10 DIAGNOSIS — I48.91 ATRIAL FIBRILLATION WITH RAPID VENTRICULAR RESPONSE: Primary | ICD-10-CM

## 2023-10-10 LAB
INR PPP: 1.8 (ref 0.91–1.09)
PROTHROMBIN TIME: 21.5 SECONDS (ref 10–13.8)

## 2023-10-10 PROCEDURE — 85610 PROTHROMBIN TIME: CPT

## 2023-10-10 PROCEDURE — G0463 HOSPITAL OUTPT CLINIC VISIT: HCPCS

## 2023-10-10 PROCEDURE — 36416 COLLJ CAPILLARY BLOOD SPEC: CPT

## 2023-10-10 NOTE — PROGRESS NOTES
Anticoagulation Clinic Progress Note    Anticoagulation Summary  As of 10/10/2023      INR goal:  2.0-3.0   TTR:  61.7% (3.6 y)   INR used for dosin.8 (10/10/2023)   Warfarin maintenance plan:  9 mg every Tue; 6 mg all other days   Weekly warfarin total:  45 mg   Plan last modified:  Rani Dhillon [facility] (2021)   Next INR check:  10/24/2023   Priority:  High   Target end date:  Indefinite    Indications    Atrial fibrillation with rapid ventricular response [I48.91]  Anticoagulated on Coumadin [Z79.01]                 Anticoagulation Episode Summary       INR check location:      Preferred lab:      Send INR reminders to:   JONAH DIAMOND [facility]    Comments:  New to warfarin 2/10; See  tele note for warfarin dosing hx. Afib + left atrial appendage thrombus at present; pending cardioversion upon resolution of CARRI thrombus.          Anticoagulation Care Providers       Provider Role Specialty Phone number    Gavin Luke MD Referring Cardiology [phone]            Clinic Interview:  Patient Findings     Positives:  Change in diet/appetite    Negatives:  Signs/symptoms of thrombosis, Signs/symptoms of bleeding,   Laboratory test error suspected, Change in health, Change in alcohol use,   Change in activity, Upcoming invasive procedure, Emergency department   visit, Upcoming dental procedure, Missed doses, Extra doses, Change in   medications, Hospital admission, Bruising, Other complaints    Comments:  Spinach smoothies last week.      Clinical Outcomes     Negatives:  Major bleeding event, Thromboembolic event,   Anticoagulation-related hospital admission, Anticoagulation-related ED   visit, Anticoagulation-related fatality    Comments:  Spinach smoothies last week.        INR History:      2022     8:30 AM 2022     3:41 PM 1/10/2023    10:45 AM 2023     1:00 PM 6/15/2023    10:15 AM 2023    10:15 AM 10/10/2023     9:15 AM   Anticoagulation Monitoring   INR 1.7   1.5 2.7 2.7 2.5 1.8   INR Date 11/28/2022  1/10/2023 5/18/2023 6/15/2023 8/18/2023 10/10/2023   INR Goal 2.0-3.0 2.0-3.0 2.0-3.0 2.0-3.0 2.0-3.0 2.0-3.0 2.0-3.0   Trend Same  Same Same Same Same Same   Last Week Total 45 mg  33 mg 45 mg 45 mg 45 mg 45 mg   Next Week Total 48 mg  48 mg 45 mg 45 mg 45 mg 48 mg   Sun 6 mg  6 mg 6 mg 6 mg 6 mg 6 mg   Mon 9 mg (11/28); Otherwise 6 mg  6 mg 6 mg 6 mg 6 mg 6 mg   Tue 9 mg  12 mg (1/10); Otherwise 9 mg 9 mg 9 mg 9 mg 12 mg (10/10); Otherwise 9 mg   Wed 6 mg  6 mg 6 mg 6 mg 6 mg 6 mg   Thu 6 mg  6 mg 6 mg 6 mg 6 mg 6 mg   Fri 6 mg  6 mg 6 mg 6 mg 6 mg 6 mg   Sat 6 mg  6 mg 6 mg 6 mg 6 mg 6 mg       Plan:  1. INR is Subtherapeutic today- see above in Anticoagulation Summary.  Will instruct Milton Moody to take a booster warfarin dose today of 12mg, then resume his current warfarin regimen- see above in Anticoagulation Summary.  2. Follow up in 2 weeks  3. Patient declines warfarin refills.  4. Verbal and written information provided. Patient expresses understanding and has no further questions at this time.    Rani Dhillon [facility]

## 2023-10-10 NOTE — TELEPHONE ENCOUNTER
Caller: Milton Moody    Relationship: Self    Best call back number: [phone]     What is the best time to reach you: ANYTIME    Who are you requesting to speak with (clinical staff, provider,  specific staff member):     Do you know the name of the person who called: UNKNOWN    What was the call regarding: PT STATES HE HAD GOTTEN A CALL AND WAS UNSURE FROM WHO  - PT FOLLOWING UP ON NEXT APPT AS HE COMPLETED HIS ECHO AND IS NOT SURE WHEN TO FOLLOW UP WITH DR MALIN     Is it okay if the provider responds through MyChart: CALL BACK PLEASE

## 2023-10-12 NOTE — TELEPHONE ENCOUNTER
Per chart review. Patient is to see EP. This has been scheduled.     Milagro Robles RN  Triage [facility]

## 2023-10-24 ENCOUNTER — APPOINTMENT (OUTPATIENT)
Dept: PHARMACY | Facility: HOSPITAL | Age: 58
End: 2023-10-24
Payer: COMMERCIAL

## 2023-10-31 ENCOUNTER — ANTICOAGULATION VISIT (OUTPATIENT)
Dept: PHARMACY | Facility: HOSPITAL | Age: 58
End: 2023-10-31
Payer: COMMERCIAL

## 2023-10-31 ENCOUNTER — OFFICE VISIT (OUTPATIENT)
Age: 58
End: 2023-10-31
Payer: COMMERCIAL

## 2023-10-31 VITALS
HEART RATE: 90 BPM | BODY MASS INDEX: 46.65 KG/M2 | DIASTOLIC BLOOD PRESSURE: 84 MMHG | WEIGHT: 315 LBS | SYSTOLIC BLOOD PRESSURE: 142 MMHG | HEIGHT: 69 IN

## 2023-10-31 DIAGNOSIS — Z79.01 ANTICOAGULATED ON COUMADIN: ICD-10-CM

## 2023-10-31 DIAGNOSIS — I44.7 LBBB (LEFT BUNDLE BRANCH BLOCK): ICD-10-CM

## 2023-10-31 DIAGNOSIS — I42.8 NICM (NONISCHEMIC CARDIOMYOPATHY): Primary | ICD-10-CM

## 2023-10-31 DIAGNOSIS — I48.91 ATRIAL FIBRILLATION WITH RAPID VENTRICULAR RESPONSE: Primary | ICD-10-CM

## 2023-10-31 DIAGNOSIS — I48.19 ATRIAL FIBRILLATION, PERSISTENT: ICD-10-CM

## 2023-10-31 LAB
INR PPP: 2 (ref 0.91–1.09)
PROTHROMBIN TIME: 24.5 SECONDS (ref 10–13.8)

## 2023-10-31 PROCEDURE — 36416 COLLJ CAPILLARY BLOOD SPEC: CPT

## 2023-10-31 PROCEDURE — 99204 OFFICE O/P NEW MOD 45 MIN: CPT | Performed by: INTERNAL MEDICINE

## 2023-10-31 PROCEDURE — 93000 ELECTROCARDIOGRAM COMPLETE: CPT | Performed by: INTERNAL MEDICINE

## 2023-10-31 PROCEDURE — 85610 PROTHROMBIN TIME: CPT

## 2023-10-31 RX ORDER — NITROGLYCERIN 0.4 MG/1
0.4 TABLET SUBLINGUAL
COMMUNITY
Start: 2023-10-18

## 2023-10-31 RX ORDER — CELECOXIB 200 MG/1
200 CAPSULE ORAL DAILY
COMMUNITY
Start: 2023-09-15

## 2023-10-31 NOTE — PROGRESS NOTES
Anticoagulation Clinic Progress Note    Anticoagulation Summary  As of 10/31/2023      INR goal:  2.0-3.0   TTR:  60.7% (3.7 y)   INR used for dosin.0 (10/31/2023)   Warfarin maintenance plan:  9 mg every Tue; 6 mg all other days   Weekly warfarin total:  45 mg   Plan last modified:  Rani Dhillon [facility] (2021)   Next INR check:  2023   Priority:  High   Target end date:  Indefinite    Indications    Atrial fibrillation with rapid ventricular response [I48.91]  Anticoagulated on Coumadin [Z79.01]                 Anticoagulation Episode Summary       INR check location:      Preferred lab:      [facility]    Comments:  New to warfarin 2/10; See  tele note for warfarin dosing hx. Afib + left atrial appendage thrombus at present; pending cardioversion upon resolution of CARRI thrombus.          Anticoagulation Care Providers       Provider Role Specialty Phone number    Gavin Luke MD Referring Cardiology [phone]            Clinic Interview:  Patient Findings     Negatives:  Signs/symptoms of thrombosis, Signs/symptoms of bleeding,   Laboratory test error suspected, Change in health, Change in alcohol use,   Change in activity, Upcoming invasive procedure, Emergency department   visit, Upcoming dental procedure, Missed doses, Extra doses, Change in   medications, Change in diet/appetite, Hospital admission, Bruising, Other   complaints      Clinical Outcomes     Negatives:  Major bleeding event, Thromboembolic event,   Anticoagulation-related hospital admission, Anticoagulation-related ED   visit, Anticoagulation-related fatality        INR History:      2022     3:41 PM 1/10/2023    10:45 AM 2023     1:00 PM 6/15/2023    10:15 AM 2023    10:15 AM 10/10/2023     9:15 AM 10/31/2023    10:15 AM   Anticoagulation Monitoring   INR  1.5 2.7 2.7 2.5 1.8 2.0   INR Date  1/10/2023 2023 6/15/2023 2023 10/10/2023 10/31/2023    INR Goal 2.0-3.0 2.0-3.0 2.0-3.0 2.0-3.0 2.0-3.0 2.0-3.0 2.0-3.0   Trend  Same Same Same Same Same Same   Last Week Total  33 mg 45 mg 45 mg 45 mg 45 mg 45 mg   Next Week Total  48 mg 45 mg 45 mg 45 mg 48 mg 45 mg   Sun  6 mg 6 mg 6 mg 6 mg 6 mg 6 mg   Mon  6 mg 6 mg 6 mg 6 mg 6 mg 6 mg   Tue  12 mg (1/10); Otherwise 9 mg 9 mg 9 mg 9 mg 12 mg (10/10); Otherwise 9 mg 9 mg   Wed  6 mg 6 mg 6 mg 6 mg 6 mg 6 mg   Thu  6 mg 6 mg 6 mg 6 mg 6 mg 6 mg   Fri  6 mg 6 mg 6 mg 6 mg 6 mg 6 mg   Sat  6 mg 6 mg 6 mg 6 mg 6 mg 6 mg   Visit Report       Report       Plan:  1. INR is Therapeutic today- see above in Anticoagulation Summary.  Will instruct Milton Moody to continue their current warfarin regimen- see above in Anticoagulation Summary.  2. Follow up in 2 weeks  3. Patient declines warfarin refills.  4. Verbal and written information provided. Patient expresses understanding and has no further questions at this time.    Rani Dhillon [facility]

## 2023-10-31 NOTE — PROGRESS NOTES
Date of Office Visit: 10/31/2023  Encounter Provider: Moris Livingston MD  Place of Service: [facility] CARDIOLOGY  Patient Name: Milton Moody  :1965    Chief Complaint   Patient presents with    NICM    longstanding persistent AFIB   :     HPI: Milton Moody is a 58 y.o. male who presents today for persistent atrial fibrillation and non ischemic cardiomyopathy.     His complaint is shortness of breath.     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     He has been in persistent atrial fibrillation for at least 2 years, probably longer    EF has been variable, currently 20-35%    He is getting a new PCP, he goes to the resident clinic [facility].            Past Medical History:   Diagnosis Date    Abnormal ECG     Asthma     Atrial fibrillation     Chest pain     CKD (chronic kidney disease) stage 3, GFR 30-59 ml/min 3/4/2020    Hyperlipidemia     Hypertension     LBBB (left bundle branch block) 10/31/2023    NICM (nonischemic cardiomyopathy) 3/4/2020    Nonischemic cardiomyopathy     Palpitations     Rapid heart rate     Sleep apnea        Past Surgical History:   Procedure Laterality Date    KNEE SURGERY Left 2019    SHOULDER ROTATOR CUFF REPAIR Right 2020       Social History     Socioeconomic History    Marital status:    Tobacco Use    Smoking status: Former     Packs/day: 0.10     Years: 3.00     Additional pack years: 0.00     Total pack years: 0.30     Types: Cigarettes    Smokeless tobacco: Former     Types: Snuff   Substance and Sexual Activity    Alcohol use: Yes     Alcohol/week: 6.0 standard drinks of alcohol     Types: 6 Cans of beer per week     Comment: weekend alcohol use    Drug use: Not Currently     Types: Marijuana    Sexual activity: Not Currently     Partners: Female       Family History   Problem Relation Age of Onset    Hypertension Mother     Hypertension Sister        Review of Systems   Constitutional: Negative.  "  Cardiovascular: Negative.    Respiratory: Negative.     Gastrointestinal: Negative.        Allergies   Allergen Reactions    Pollen Extract Other (See Comments)    Seasonal Ic [Cholestatin] Unknown - Low Severity         Current Outpatient Medications:     atorvastatin (LIPITOR) 40 MG tablet, Take 1 tablet by mouth Every Night., Disp: , Rfl:     celecoxib (CeleBREX) 200 MG capsule, Take 1 capsule by mouth Daily., Disp: , Rfl:     cyclobenzaprine (FLEXERIL) 10 MG tablet, Take 1 tablet by mouth At Night As Needed., Disp: , Rfl:     digoxin (Lanoxin) 250 MCG tablet, Take 1 tablet by mouth Daily., Disp: 90 tablet, Rfl: 3    fluticasone (FLONASE) 50 MCG/ACT nasal spray, , Disp: , Rfl:     furosemide (LASIX) 40 MG tablet, TAKE 1 TABLET BY MOUTH DAILY, Disp: 5 tablet, Rfl: 0    metFORMIN (GLUCOPHAGE) 500 MG tablet, Take 1 tablet by mouth 2 (Two) Times a Day., Disp: , Rfl:     metoprolol succinate XL (TOPROL-XL) 100 MG 24 hr tablet, Take 0.5 tablets by mouth 2 (Two) Times a Day., Disp: 180 tablet, Rfl: 3    nitroglycerin (NITROSTAT) 0.4 MG SL tablet, Place 1 tablet under the tongue Every 5 (Five) Minutes As Needed., Disp: , Rfl:     pantoprazole (PROTONIX) 40 MG EC tablet, , Disp: , Rfl:     potassium chloride ER (K-TAB) 20 MEQ tablet controlled-release ER tablet, TAKE ONE TABLET BY MOUTH TWICE A DAY, Disp: 60 tablet, Rfl: 3    sacubitril-valsartan (Entresto) 49-51 MG tablet, Take 1 tablet by mouth 2 (Two) Times a Day., Disp: 180 tablet, Rfl: 3    Semaglutide,0.25 or 0.5MG/DOS, (OZEMPIC) 2 MG/1.5ML solution pen-injector, Inject 0.25 mg under the skin into the appropriate area as directed., Disp: , Rfl:     warfarin (COUMADIN) 6 MG tablet, TAKE 1 AND 1/2 TABLETS (9MG)  BY MOUTH ON TUESDAYS, AND TAKE 1 TABLET  (6 MG) ALL OTHER DAYS OR AS DIRECTED, Disp: 100 tablet, Rfl: 1      Objective:     Vitals:    10/31/23 0945   BP: 142/84   Pulse: 90   Weight: (!) 177 kg (391 lb)   Height: 175.3 cm (69\")     Body mass index is 57.74 " kg/m².    PHYSICAL EXAM:    Vitals and nursing note reviewed.   Constitutional:       Appearance: Well-developed.   Eyes:      General:         Right eye: No discharge.         Left eye: No discharge.      Conjunctiva/sclera: Conjunctivae normal.   HENT:      Head: Normocephalic and atraumatic.   Neck:      Vascular: No JVD.   Pulmonary:      Effort: No respiratory distress.   Cardiovascular:      Normal rate.   Edema:     Peripheral edema absent.   Abdominal:      General: There is no distension.      Tenderness: There is no abdominal tenderness.   Musculoskeletal: Normal range of motion.      Cervical back: Neck supple. Neurological:      Cranial Nerves: No cranial nerve deficit.             ECG 12 Lead    Date/Time: 10/31/2023 3:18 PM  Performed by: Moris Livingston MD    Authorized by: Moris Livingston MD  Comparison: compared with previous ECG   Rhythm: atrial fibrillation  Conduction: left bundle branch block      I reviewed echo, difficult pictures, EF 30-35%      Assessment:       Diagnosis Plan   1. NICM (nonischemic cardiomyopathy)        2. Atrial fibrillation, persistent        3. LBBB (left bundle branch block)               Plan:       I don't think there is anything that can be done for his atrial fibrillation.     Consider CRT with probably with AV Node, but not sure that's going to help    He really needs focused intervention on his weight,  He was prescribed ozempic, and took for a month, but can't get it.     I'm going to reach out to his new PCP to see if she can help    As always, it has been a pleasure to participate in your patient's care.      Sincerely,         Moris Livingston MD

## 2023-11-10 NOTE — PROGRESS NOTES
"CC: Atrial fibrillation with RVR    Interval History: No acute events overnight.  Patient is ambulating and reports improved shortness of breath.      Vital Signs  Temp:  [97.5 °F (36.4 °C)-98.2 °F (36.8 °C)] 97.7 °F (36.5 °C)  Heart Rate:  [67-91] 91  Resp:  [16-18] 18  BP: ()/(31-98) 113/80    Intake/Output Summary (Last 24 hours) at 2/12/2020 0823  Last data filed at 2/11/2020 1700  Gross per 24 hour   Intake 850 ml   Output --   Net 850 ml     Flowsheet Rows      First Filed Value   Admission Height  175.3 cm (69\") Documented at 02/10/2020 0944   Admission Weight  (!) 157 kg (346 lb 8 oz) Documented at 02/10/2020 0944          PHYSICAL EXAM:  General: No acute distress, morbidly obese  Resp:NL Rate, symmetric chest expansion,unlabored, clear  CV: Irregularly irregular but normal rate, NL PMI, NL S1 and S2, no Murmur, no gallop, no rub, No JVD.   ABD:Nl sounds, no masses or tenderness, nondistended, no guarding or rebound  Neuro: alert,cooperative and oriented  Extr:Normal pedal pulses, No edema or cyanosis, moves all extremities      Results Review:    Results from last 7 days   Lab Units 02/12/20  0617   SODIUM mmol/L 138   POTASSIUM mmol/L 4.3   CHLORIDE mmol/L 99   CO2 mmol/L 25.0   BUN mg/dL 14   CREATININE mg/dL 1.40*   GLUCOSE mg/dL 102*   CALCIUM mg/dL 9.1     Results from last 7 days   Lab Units 02/10/20  1025   TROPONIN T ng/mL <0.010     Results from last 7 days   Lab Units 02/12/20  0617   WBC 10*3/mm3 5.68   HEMOGLOBIN g/dL 17.5   HEMATOCRIT % 52.2*   PLATELETS 10*3/mm3 262     Results from last 7 days   Lab Units 02/12/20  0617 02/11/20  1048 02/11/20  0559  02/10/20  1025   INR  1.14*  --  1.15*  --  1.24*   APTT seconds 82.8* 70.7* 86.5*   < > 35.8*    < > = values in this interval not displayed.     Results from last 7 days   Lab Units 02/11/20  0559   CHOLESTEROL mg/dL 97     Results from last 7 days   Lab Units 02/11/20  0559   MAGNESIUM mg/dL 2.1     Results from last 7 days   Lab Units " Patient states he knows he has a hernia and is asking for a referral.  He wants this taken care of this year since he met his deductible.   02/11/20  0559   CHOLESTEROL mg/dL 97   TRIGLYCERIDES mg/dL 76   HDL CHOL mg/dL 37*   LDL CHOL mg/dL 45     I reviewed the patient's new clinical results.  I personally viewed and interpreted the patient's EKG/Telemetry data, I personally viewed and reviewed labs        Medication Review:   Meds reviewed      heparin (porcine) 6.21 Units/kg/hr Last Rate: 12 Units/kg/hr (02/12/20 0728)   lactated ringers 9 mL/hr    Pharmacy to dose warfarin         Assessment/Plan   1.   Persistent atrial fibrillation with rapid ventricular response  -Heart rate controlled on carvedilol 6.25 mg p.o. twice daily  -[id number] score of 2 with left atrial appendage thrombus on KONRAD  -Started on heparin drip with Coumadin.  Continue Coumadin until INR is therapeutic.  INR today is 1.15 but heparin is therapeutic     2.  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  His blood pressure has been relatively low compared to patient's baseline  -consider ischemic work-up once patient is stable including coronary angiogram    3.  Morbidly obese- BMI 52  -Consulted nutrition services  -Need referral to bariatric surgery as well.  4.  Hypertension  -Controlled  5.  Obstructive sleep apnea  -Continue home CPAP  6.  Medication noncompliance  -Patient counseled     7. Stage 2 CKD   -Slight increase in creatinine we will continue to monitor        Gavin Luke MD  02/12/20  8:23 AM

## 2023-11-14 RX ORDER — WARFARIN SODIUM 6 MG/1
TABLET ORAL
Qty: 100 TABLET | Refills: 1 | Status: SHIPPED | OUTPATIENT
Start: 2023-11-14 | End: 2023-11-18 | Stop reason: SDUPTHER

## 2023-11-20 RX ORDER — WARFARIN SODIUM 6 MG/1
TABLET ORAL
Qty: 100 TABLET | Refills: 1 | Status: SHIPPED | OUTPATIENT
Start: 2023-11-20

## 2023-11-22 ENCOUNTER — ANTICOAGULATION VISIT (OUTPATIENT)
Dept: PHARMACY | Facility: HOSPITAL | Age: 58
End: 2023-11-22
Payer: COMMERCIAL

## 2023-11-22 DIAGNOSIS — Z79.01 ANTICOAGULATED ON COUMADIN: ICD-10-CM

## 2023-11-22 DIAGNOSIS — I48.91 ATRIAL FIBRILLATION WITH RAPID VENTRICULAR RESPONSE: Primary | ICD-10-CM

## 2023-11-22 LAB
INR PPP: 1.2
INR PPP: 1.2 (ref 0.91–1.09)
PROTHROMBIN TIME: 14.7 SECONDS (ref 10–13.8)

## 2023-11-22 PROCEDURE — 36416 COLLJ CAPILLARY BLOOD SPEC: CPT

## 2023-11-22 PROCEDURE — G0463 HOSPITAL OUTPT CLINIC VISIT: HCPCS

## 2023-11-22 PROCEDURE — 85610 PROTHROMBIN TIME: CPT

## 2023-11-22 NOTE — PROGRESS NOTES
I have supervised and reviewed the notes, assessments, and/or procedures performed by our Pharmacy Intern. The documented assessment and plan were developed cooperatively, and the plan was implemented in my presence. I concur with the documentation of this patient encounter.    Jessica Talbot [facility]

## 2023-11-22 NOTE — PROGRESS NOTES
Anticoagulation Clinic Progress Note    Anticoagulation Summary  As of 2023      INR goal:  2.0-3.0   TTR:  59.8% (3.7 y)   INR used for dosin.20 (2023)   Warfarin maintenance plan:  9 mg every Tue; 6 mg all other days   Weekly warfarin total:  45 mg   Plan last modified:  Rani Dhillon [facility] (2021)   Next INR check:  2023   Priority:  High   Target end date:  Indefinite    Indications    Atrial fibrillation with rapid ventricular response [I48.91]  Anticoagulated on Coumadin [Z79.01]                 Anticoagulation Episode Summary       INR check location:      Preferred lab:      [facility]    Comments:  New to warfarin 2/10; See  tele note for warfarin dosing hx. Afib + left atrial appendage thrombus at present; pending cardioversion upon resolution of CARRI thrombus.          Anticoagulation Care Providers       Provider Role Specialty Phone number    Gavin Luke MD Referring Cardiology [phone]            Clinic Interview:  Patient Findings     Negatives:  Signs/symptoms of thrombosis, Signs/symptoms of bleeding,   Laboratory test error suspected, Change in health, Change in alcohol use,   Change in activity, Upcoming invasive procedure, Emergency department   visit, Upcoming dental procedure, Missed doses, Extra doses, Change in   medications, Change in diet/appetite, Hospital admission, Bruising, Other   complaints    Comments:  Patient reports that the only changes have been missed doses   in medication, due to pharmacy issues       Clinical Outcomes     Negatives:  Major bleeding event, Thromboembolic event,   Anticoagulation-related hospital admission, Anticoagulation-related ED   visit, Anticoagulation-related fatality    Comments:  Patient reports that the only changes have been missed doses   in medication, due to pharmacy issues         INR History:      2023     1:00 PM 6/15/2023    10:15 AM 2023     10:15 AM 10/10/2023     9:15 AM 10/31/2023    10:15 AM 11/22/2023    12:00 AM 11/22/2023     9:15 AM   Anticoagulation Monitoring   INR 2.7 2.7 2.5 1.8 2.0  1.20   INR Date 5/18/2023 6/15/2023 8/18/2023 10/10/2023 10/31/2023  11/22/2023   INR Goal 2.0-3.0 2.0-3.0 2.0-3.0 2.0-3.0 2.0-3.0  2.0-3.0   Trend Same Same Same Same Same  Same   Last Week Total 45 mg 45 mg 45 mg 45 mg 45 mg  30 mg   Next Week Total 45 mg 45 mg 45 mg 48 mg 45 mg  51 mg   Sun 6 mg 6 mg 6 mg 6 mg 6 mg  6 mg   Mon 6 mg 6 mg 6 mg 6 mg 6 mg  6 mg   Tue 9 mg 9 mg 9 mg 12 mg (10/10); Otherwise 9 mg 9 mg  9 mg   Wed 6 mg 6 mg 6 mg 6 mg 6 mg  9 mg (11/22)   Thu 6 mg 6 mg 6 mg 6 mg 6 mg  9 mg (11/23)   Fri 6 mg 6 mg 6 mg 6 mg 6 mg  6 mg   Sat 6 mg 6 mg 6 mg 6 mg 6 mg  6 mg   Historical INR      1.20        Visit Report     Report         This result is from an external source.       Plan:  1. INR is Subtherapeutic today- see above in Anticoagulation Summary.  Will instruct Milton Moody to increase their warfarin regimen- see above in Anticoagulation Summary.boost today and tomorrow 9 mg and then cont same 9mg tues 6 mg AOD   2. Follow up in 1 week  3. Patient declines warfarin refills.  4. Verbal and written information provided. Patient expresses understanding and has no further questions at this time.    Tammy Reyes, Pharmacy Intern

## 2023-11-29 ENCOUNTER — ANTICOAGULATION VISIT (OUTPATIENT)
Dept: PHARMACY | Facility: HOSPITAL | Age: 58
End: 2023-11-29
Payer: COMMERCIAL

## 2023-11-29 DIAGNOSIS — Z79.01 ANTICOAGULATED ON COUMADIN: ICD-10-CM

## 2023-11-29 DIAGNOSIS — I48.91 ATRIAL FIBRILLATION WITH RAPID VENTRICULAR RESPONSE: Primary | ICD-10-CM

## 2023-11-29 LAB
INR PPP: 3.1 (ref 0.91–1.09)
PROTHROMBIN TIME: 37.2 SECONDS (ref 10–13.8)

## 2023-11-29 PROCEDURE — 85610 PROTHROMBIN TIME: CPT

## 2023-11-29 PROCEDURE — 36416 COLLJ CAPILLARY BLOOD SPEC: CPT

## 2023-11-29 NOTE — PROGRESS NOTES
Anticoagulation Clinic Progress Note    Anticoagulation Summary  As of 11/29/2023      INR goal:  2.0-3.0   TTR:  59.7% (3.8 y)   INR used for dosing:  3.1 (11/29/2023)   Warfarin maintenance plan:  9 mg every Tue; 6 mg all other days   Weekly warfarin total:  45 mg   No change documented:  Marlene Mo, Pharmacy Technician   Plan last modified:  Rani Dhillon [facility] (1/5/2021)   Next INR check:  12/13/2023   Priority:  High   Target end date:  Indefinite    Indications    Atrial fibrillation with rapid ventricular response [I48.91]  Anticoagulated on Coumadin [Z79.01]                 Anticoagulation Episode Summary       INR check location:      Preferred lab:      Send INR reminders to:   JONAH DIAMOND [facility] [place]    Comments:  New to warfarin 2/10; See 2/18 tele note for warfarin dosing hx. Afib + left atrial appendage thrombus at present; pending cardioversion upon resolution of CARRI thrombus.          Anticoagulation Care Providers       Provider Role Specialty Phone number    Gavin Luke MD Referring Cardiology [phone]            Clinic Interview:  Patient Findings     Positives:  Extra doses    Negatives:  Signs/symptoms of thrombosis, Signs/symptoms of bleeding,   Laboratory test error suspected, Change in health, Change in alcohol use,   Change in activity, Upcoming invasive procedure, Emergency department   visit, Upcoming dental procedure, Missed doses, Change in medications,   Change in diet/appetite, Hospital admission, Bruising, Other complaints    Comments:  Patient reports boosting with 9mg wed/thurs AND Friday last   week rather than just wed/thurs.      Clinical Outcomes     Negatives:  Major bleeding event, Thromboembolic event,   Anticoagulation-related hospital admission, Anticoagulation-related ED   visit, Anticoagulation-related fatality    Comments:  Patient reports boosting with 9mg wed/thurs AND Friday last   week rather than just wed/thurs.        INR History:       6/15/2023    10:15 AM 8/18/2023    10:15 AM 10/10/2023     9:15 AM 10/31/2023    10:15 AM 11/22/2023    12:00 AM 11/22/2023     9:15 AM 11/29/2023    11:30 AM   Anticoagulation Monitoring   INR 2.7 2.5 1.8 2.0  1.20 3.1   INR Date 6/15/2023 8/18/2023 10/10/2023 10/31/2023  11/22/2023 11/29/2023   INR Goal 2.0-3.0 2.0-3.0 2.0-3.0 2.0-3.0  2.0-3.0 2.0-3.0   Trend Same Same Same Same  Same Same   Last Week Total 45 mg 45 mg 45 mg 45 mg  30 mg 54 mg   Next Week Total 45 mg 45 mg 48 mg 45 mg  51 mg 45 mg   Sun 6 mg 6 mg 6 mg 6 mg  6 mg 6 mg   Mon 6 mg 6 mg 6 mg 6 mg  6 mg 6 mg   Tue 9 mg 9 mg 12 mg (10/10); Otherwise 9 mg 9 mg  9 mg 9 mg   Wed 6 mg 6 mg 6 mg 6 mg  9 mg (11/22) 6 mg   Thu 6 mg 6 mg 6 mg 6 mg  9 mg (11/23) 6 mg   Fri 6 mg 6 mg 6 mg 6 mg  6 mg 6 mg   Sat 6 mg 6 mg 6 mg 6 mg  6 mg 6 mg   Historical INR     1.20         Visit Report    Report          This result is from an external source.       Plan:  1. INR is out of range- see above in Anticoagulation Summary.   Will instruct Milton Moody to Continue  their warfarin regimen- see above in Anticoagulation Summary.  2. Follow up in 2 weeks.   3. Patient declines warfarin refills.  4. Verbal and written information provided. Patient expresses understanding and has no further questions at this time.    Marlene Mo, Pharmacy Technician

## 2023-12-14 RX ORDER — METOPROLOL SUCCINATE 100 MG/1
50 TABLET, EXTENDED RELEASE ORAL 2 TIMES DAILY
Qty: 180 TABLET | Refills: 3 | Status: SHIPPED | OUTPATIENT
Start: 2023-12-14

## 2024-01-08 ENCOUNTER — ANTICOAGULATION VISIT (OUTPATIENT)
Dept: PHARMACY | Facility: HOSPITAL | Age: 59
End: 2024-01-08
Payer: COMMERCIAL

## 2024-01-08 DIAGNOSIS — I48.91 ATRIAL FIBRILLATION WITH RAPID VENTRICULAR RESPONSE: Primary | ICD-10-CM

## 2024-01-08 DIAGNOSIS — Z79.01 ANTICOAGULATED ON COUMADIN: ICD-10-CM

## 2024-01-08 LAB
INR PPP: 2.9 (ref 0.91–1.09)
PROTHROMBIN TIME: 35.1 SECONDS (ref 10–13.8)

## 2024-01-08 PROCEDURE — 36416 COLLJ CAPILLARY BLOOD SPEC: CPT

## 2024-01-08 PROCEDURE — 85610 PROTHROMBIN TIME: CPT

## 2024-01-08 PROCEDURE — G0463 HOSPITAL OUTPT CLINIC VISIT: HCPCS

## 2024-01-08 NOTE — PROGRESS NOTES
Anticoagulation Clinic Progress Note    Anticoagulation Summary  As of 2024      INR goal:  2.0-3.0   TTR:  59.4% (3.9 y)   INR used for dosin.9 (2024)   Warfarin maintenance plan:  9 mg every Tue; 6 mg all other days   Weekly warfarin total:  45 mg   No change documented:  Michelle Francis [facility]   Plan last modified:  Rani Dhillon [facility] (2021)   Next INR check:  2024   Priority:  High   Target end date:  Indefinite    Indications    Atrial fibrillation with rapid ventricular response [I48.91]  Anticoagulated on Coumadin [Z79.01]                 Anticoagulation Episode Summary       INR check location:      Preferred lab:      Send INR reminders to:   JONAH DIAMOND [facility]    Comments:  New to warfarin 2/10; See  tele note for warfarin dosing hx. Afib + left atrial appendage thrombus at present; pending cardioversion upon resolution of CARRI thrombus.          Anticoagulation Care Providers       Provider Role Specialty Phone number    Gavin Luke MD Referring Cardiology [phone]            Clinic Interview:  Patient Findings     Positives:  Change in medications    Negatives:  Signs/symptoms of thrombosis, Signs/symptoms of bleeding,   Laboratory test error suspected, Change in health, Change in alcohol use,   Change in activity, Upcoming invasive procedure, Emergency department   visit, Upcoming dental procedure, Missed doses, Extra doses, Change in   diet/appetite, Hospital admission, Bruising, Other complaints    Comments:  Patient reports restarting Ozempic but has not had any   significant changes in diet/appetite yet. He reports no other changes or   concerns.      Clinical Outcomes     Negatives:  Major bleeding event, Thromboembolic event,   Anticoagulation-related hospital admission, Anticoagulation-related ED   visit, Anticoagulation-related fatality    Comments:  Patient reports restarting Ozempic but has not had any   significant changes in diet/appetite yet.  He reports no other changes or   concerns.        INR History:      8/18/2023    10:15 AM 10/10/2023     9:15 AM 10/31/2023    10:15 AM 11/22/2023    12:00 AM 11/22/2023     9:15 AM 11/29/2023    11:30 AM 1/8/2024     9:30 AM   Anticoagulation Monitoring   INR 2.5 1.8 2.0  1.20 3.1 2.9   INR Date 8/18/2023 10/10/2023 10/31/2023  11/22/2023 11/29/2023 1/8/2024   INR Goal 2.0-3.0 2.0-3.0 2.0-3.0  2.0-3.0 2.0-3.0 2.0-3.0   Trend Same Same Same  Same Same Same   Last Week Total 45 mg 45 mg 45 mg  30 mg 54 mg 45 mg   Next Week Total 45 mg 48 mg 45 mg  51 mg 45 mg 45 mg   Sun 6 mg 6 mg 6 mg  6 mg 6 mg 6 mg   Mon 6 mg 6 mg 6 mg  6 mg 6 mg 6 mg   Tue 9 mg 12 mg (10/10); Otherwise 9 mg 9 mg  9 mg 9 mg 9 mg   Wed 6 mg 6 mg 6 mg  9 mg (11/22) 6 mg 6 mg   Thu 6 mg 6 mg 6 mg  9 mg (11/23) 6 mg 6 mg   Fri 6 mg 6 mg 6 mg  6 mg 6 mg 6 mg   Sat 6 mg 6 mg 6 mg  6 mg 6 mg 6 mg   Historical INR    1.20          Visit Report   Report           This result is from an external source.       Plan:  1. INR is Therapeutic today- see above in Anticoagulation Summary.  Will instruct Milton Moody to Continue their warfarin regimen- see above in Anticoagulation Summary.  2. Follow up in 2 weeks  3. Patient declines warfarin refills.  4. Verbal and written information provided. Patient expresses understanding and has no further questions at this time.    Michelle Francis [facility]

## 2024-01-22 ENCOUNTER — ANTICOAGULATION VISIT (OUTPATIENT)
Dept: PHARMACY | Facility: HOSPITAL | Age: 59
End: 2024-01-22
Payer: COMMERCIAL

## 2024-01-22 DIAGNOSIS — I48.91 ATRIAL FIBRILLATION WITH RAPID VENTRICULAR RESPONSE: Primary | ICD-10-CM

## 2024-01-22 DIAGNOSIS — Z79.01 ANTICOAGULATED ON COUMADIN: ICD-10-CM

## 2024-01-22 LAB
INR PPP: 2.2 (ref 0.91–1.09)
PROTHROMBIN TIME: 26 SECONDS (ref 10–13.8)

## 2024-01-22 PROCEDURE — 36416 COLLJ CAPILLARY BLOOD SPEC: CPT

## 2024-01-22 PROCEDURE — G0463 HOSPITAL OUTPT CLINIC VISIT: HCPCS

## 2024-01-22 PROCEDURE — 85610 PROTHROMBIN TIME: CPT

## 2024-01-22 NOTE — PROGRESS NOTES
Anticoagulation Clinic Progress Note    Anticoagulation Summary  As of 2024      INR goal:  2.0-3.0   TTR:  59.8% (3.9 y)   INR used for dosin.2 (2024)   Warfarin maintenance plan:  9 mg every Tue; 6 mg all other days   Weekly warfarin total:  45 mg   No change documented:  Marlene Mo, Pharmacy Technician   Plan last modified:  Rani Dhillon [facility] (2021)   Next INR check:  2024   Priority:  High   Target end date:  Indefinite    Indications    Atrial fibrillation with rapid ventricular response [I48.91]  Anticoagulated on Coumadin [Z79.01]                 Anticoagulation Episode Summary       INR check location:      Preferred lab:      Send INR reminders to:   [facility]AH [facility]    Comments:  New to warfarin 2/10; See  tele note for warfarin dosing hx. Afib + left atrial appendage thrombus at present; pending cardioversion upon resolution of CARRI thrombus.          Anticoagulation Care Providers       Provider Role Specialty Phone number    Gavin Luke MD Referring Cardiology [phone]            Clinic Interview:  Patient Findings     Negatives:  Signs/symptoms of thrombosis, Signs/symptoms of bleeding,   Laboratory test error suspected, Change in health, Change in alcohol use,   Change in activity, Upcoming invasive procedure, Emergency department   visit, Upcoming dental procedure, Missed doses, Extra doses, Change in   medications, Change in diet/appetite, Hospital admission, Bruising, Other   complaints      Clinical Outcomes     Negatives:  Major bleeding event, Thromboembolic event,   Anticoagulation-related hospital admission, Anticoagulation-related ED   visit, Anticoagulation-related fatality        INR History:      10/10/2023     9:15 AM 10/31/2023    10:15 AM 2023    12:00 AM 2023     9:15 AM 2023    11:30 AM 2024     9:30 AM 2024     8:45 AM   Anticoagulation Monitoring   INR 1.8 2.0  1.20 3.1 2.9 2.2   INR Date  10/10/2023 10/31/2023  11/22/2023 11/29/2023 1/8/2024 1/22/2024   INR Goal 2.0-3.0 2.0-3.0  2.0-3.0 2.0-3.0 2.0-3.0 2.0-3.0   Trend Same Same  Same Same Same Same   Last Week Total 45 mg 45 mg  30 mg 54 mg 45 mg 45 mg   Next Week Total 48 mg 45 mg  51 mg 45 mg 45 mg 45 mg   Sun 6 mg 6 mg  6 mg 6 mg 6 mg 6 mg   Mon 6 mg 6 mg  6 mg 6 mg 6 mg 6 mg   Tue 12 mg (10/10); Otherwise 9 mg 9 mg  9 mg 9 mg 9 mg 9 mg   Wed 6 mg 6 mg  9 mg (11/22) 6 mg 6 mg 6 mg   Thu 6 mg 6 mg  9 mg (11/23) 6 mg 6 mg 6 mg   Fri 6 mg 6 mg  6 mg 6 mg 6 mg 6 mg   Sat 6 mg 6 mg  6 mg 6 mg 6 mg 6 mg   Historical INR   1.20           Visit Report  Report            This result is from an external source.       Plan:  1. INR is therapeutic today- see above in Anticoagulation Summary.   Will instruct Milton Moody to continue their warfarin regimen- see above in Anticoagulation Summary.  2. Follow up in 4 weeks.  3. Patient declines warfarin refills.  4. Verbal and written information provided. Patient expresses understanding and has no further questions at this time.    Marlene Mo, Pharmacy Technician

## 2024-02-01 ENCOUNTER — OFFICE VISIT (OUTPATIENT)
Age: 59
End: 2024-02-01
Payer: COMMERCIAL

## 2024-02-01 VITALS
WEIGHT: 315 LBS | HEART RATE: 82 BPM | HEIGHT: 69 IN | BODY MASS INDEX: 46.65 KG/M2 | SYSTOLIC BLOOD PRESSURE: 142 MMHG | DIASTOLIC BLOOD PRESSURE: 90 MMHG

## 2024-02-01 DIAGNOSIS — I44.7 LBBB (LEFT BUNDLE BRANCH BLOCK): ICD-10-CM

## 2024-02-01 DIAGNOSIS — I48.19 ATRIAL FIBRILLATION, PERSISTENT: Primary | ICD-10-CM

## 2024-02-01 DIAGNOSIS — I42.8 NICM (NONISCHEMIC CARDIOMYOPATHY): ICD-10-CM

## 2024-02-01 RX ORDER — FAMOTIDINE 40 MG/1
20 TABLET, FILM COATED ORAL DAILY
COMMUNITY
Start: 2023-11-28

## 2024-02-01 NOTE — PROGRESS NOTES
"Date of Office Visit: 2024  Encounter Provider: WENDY Denton  Place of Service: [facility] CARDIOLOGY  Patient Name: Milton Moody  :1965    Chief Complaint   Patient presents with    NICM    LBBB    persistent AFIB   :     HPI: Milton Moody is a 58 y.o. male who follows with Dr. Luke, referred to Dr. Livingston. He has a history of longstanding persistent AF and non-ischemic cardiomyopathy.     He has a history of long standing persistent atrial fibrillation been controlled on metoprolol and warfarin.    He was diagnosed with non ischemic cardiomyopathy in . No evidence of ischemia, EF was 21%. His LVEF improved to 41-45% in 2023.     In 2023 EF measured around 35%. He was referred to Dr. Livingston.     Saw Dr. Livingston in October. His only complaint was dyspnea. Not a candidate for rhythm control due the duration of his AF. CRT was mentioned but deferred.                 He presents today for follow up appt.     Since last visit he has been doing okay.     Still having some dyspnea with exertion but slightly improved.     He attributes this to being \"lazy\", says he has not been doing much in terms of activity.     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     Has been in persistent AF for at least 3 years.     EKG shows NSR. QRS is 144ms. IVCD.     Denies any chest pain, palpitations, or syncope.     Last LVEF was 30-35%.     He has class II NYHA symptoms.    On GDMT with BB, entresto, and lasix.    Scheduled for should surgery in May.      Past Medical History:   Diagnosis Date    Abnormal ECG     Asthma     Atrial fibrillation     Chest pain     CKD (chronic kidney disease) stage 3, GFR 30-59 ml/min 3/4/2020    Hyperlipidemia     Hypertension     LBBB (left bundle branch block) 10/31/2023    NICM (nonischemic cardiomyopathy) 3/4/2020    Nonischemic cardiomyopathy     Palpitations     Rapid heart rate     Sleep apnea        Past Surgical History: "   Procedure Laterality Date    KNEE SURGERY Left 2019    SHOULDER ROTATOR CUFF REPAIR Right 01/2020       Social History     Socioeconomic History    Marital status:    Tobacco Use    Smoking status: Former     Packs/day: 0.10     Years: 3.00     Additional pack years: 0.00     Total pack years: 0.30     Types: Cigarettes    Smokeless tobacco: Former     Types: Snuff   Vaping Use    Vaping Use: Never used   Substance and Sexual Activity    Alcohol use: Yes     Alcohol/week: 6.0 standard drinks of alcohol     Types: 6 Cans of beer per week     Comment: weekend alcohol use    Drug use: Not Currently     Types: Marijuana    Sexual activity: Not Currently     Partners: Female       Family History   Problem Relation Age of Onset    Hypertension Mother     Hypertension Sister        Review of Systems   Constitutional: Negative for chills, fever and malaise/fatigue.   Cardiovascular:  Positive for dyspnea on exertion. Negative for chest pain, leg swelling, near-syncope, orthopnea, palpitations, paroxysmal nocturnal dyspnea and syncope.   Respiratory:  Negative for cough and shortness of breath.    Hematologic/Lymphatic: Negative.    Musculoskeletal:  Negative for joint pain, joint swelling and myalgias.   Gastrointestinal:  Negative for abdominal pain, diarrhea, melena, nausea and vomiting.   Genitourinary:  Negative for frequency and hematuria.   Neurological:  Negative for light-headedness, numbness, paresthesias and seizures.   Allergic/Immunologic: Negative.    All other systems reviewed and are negative.      Allergies   Allergen Reactions    Pollen Extract Other (See Comments)    Seasonal Ic [Cholestatin] Unknown - Low Severity         Current Outpatient Medications:     atorvastatin (LIPITOR) 40 MG tablet, Take 1 tablet by mouth Every Night., Disp: , Rfl:     celecoxib (CeleBREX) 200 MG capsule, Take 1 capsule by mouth Daily., Disp: , Rfl:     digoxin (Lanoxin) 250 MCG tablet, Take 1 tablet by mouth Daily.,  "Disp: 90 tablet, Rfl: 3    famotidine (PEPCID) 40 MG tablet, Take 0.5 tablets by mouth Daily., Disp: , Rfl:     furosemide (LASIX) 40 MG tablet, TAKE 1 TABLET BY MOUTH DAILY, Disp: 5 tablet, Rfl: 0    metFORMIN (GLUCOPHAGE) 500 MG tablet, Take 1 tablet by mouth 2 (Two) Times a Day., Disp: , Rfl:     metoprolol succinate XL (TOPROL-XL) 100 MG 24 hr tablet, Take 0.5 tablets by mouth 2 (Two) Times a Day., Disp: 180 tablet, Rfl: 3    nitroglycerin (NITROSTAT) 0.4 MG SL tablet, Place 1 tablet under the tongue Every 5 (Five) Minutes As Needed., Disp: , Rfl:     potassium chloride ER (K-TAB) 20 MEQ tablet controlled-release ER tablet, TAKE ONE TABLET BY MOUTH TWICE A DAY, Disp: 60 tablet, Rfl: 3    sacubitril-valsartan (Entresto) 49-51 MG tablet, Take 1 tablet by mouth 2 (Two) Times a Day., Disp: 180 tablet, Rfl: 3    Semaglutide,0.25 or 0.5MG/DOS, (OZEMPIC) 2 MG/1.5ML solution pen-injector, Inject 0.25 mg under the skin into the appropriate area as directed., Disp: , Rfl:     warfarin (COUMADIN) 6 MG tablet, TAKE 1 AND 1/2 TABLETS (9MG)  BY MOUTH ON TUESDAYS, AND TAKE 1 TABLET  (6 MG) ALL OTHER DAYS OR AS DIRECTED, Disp: 100 tablet, Rfl: 1      Objective:     Vitals:    02/01/24 0901   BP: 142/90   Pulse: 82   Weight: (!) 180 kg (397 lb)   Height: 175.3 cm (69\")     Body mass index is 58.63 kg/m².    PHYSICAL EXAM:    Vitals Reviewed.   General Appearance: No acute distress, well developed and well nourished.   Eyes: Conjunctiva and lids: No erythema, swelling, or discharge. Sclera non-icteric.   HENT: Atraumatic, normocephalic. External eyes, ears, and nose normal.   Respiratory: No signs of respiratory distress. Respiration rhythm and depth normal.   Clear to auscultation. No rales, crackles, rhonchi, or wheezing auscultated.   Cardiovascular:  Heart Rate and Rhythm: irregularly irregular, Heart Sounds: Normal S1 and S2. No S3 or S4 noted.  Gastrointestinal:  Abdomen soft, non-distended, non-tender.   Musculoskeletal: " Normal movement of extremities  Skin: Warm and dry.   Psychiatric: Patient alert and oriented to person, place, and time. Speech and behavior appropriate. Normal mood and affect.       ECG 12 Lead    Date/Time: 2/1/2024 9:50 AM  Performed by: Paco Chase APRN    Authorized by: Paco Chase APRN  Comparison: compared with previous ECG   Similar to previous ECG  Rhythm: atrial fibrillation  BPM: 78            Assessment:       Diagnosis Plan   1. Atrial fibrillation, persistent        2. LBBB (left bundle branch block)        3. NICM (nonischemic cardiomyopathy)               Plan:   Persistent AF--- he has longstanding persistent AF. Treated with beta blocker and warfarin. Due to duration of AF, rhythm control was deferred, rate is well controlled.         2-3. LBBB, NICM---- he does not have traditional LBBB, QRS is 144ms today. His LVEF was last measure around 35% and has been variable. Discussed with Dr. Livingston, with minimal symptoms will defer device placement. Patient attributes his dyspnea to being inactive and would like to see if improves with increased activity.     He is going to consider device placement and if symptoms persist rediscuss in 6 months.     Follow up with Dr. Livingston in 6 months.         As always, it has been a pleasure to participate in your patient's care.      Sincerely,         WENDY Denton

## 2024-02-29 ENCOUNTER — ANTICOAGULATION VISIT (OUTPATIENT)
Dept: PHARMACY | Facility: HOSPITAL | Age: 59
End: 2024-02-29
Payer: COMMERCIAL

## 2024-02-29 DIAGNOSIS — I48.91 ATRIAL FIBRILLATION WITH RAPID VENTRICULAR RESPONSE: Primary | ICD-10-CM

## 2024-02-29 DIAGNOSIS — Z79.01 ANTICOAGULATED ON COUMADIN: ICD-10-CM

## 2024-02-29 LAB
INR PPP: 1.9 (ref 0.91–1.09)
PROTHROMBIN TIME: 22.7 SECONDS (ref 10–13.8)

## 2024-02-29 PROCEDURE — 85610 PROTHROMBIN TIME: CPT

## 2024-02-29 PROCEDURE — G0463 HOSPITAL OUTPT CLINIC VISIT: HCPCS

## 2024-02-29 PROCEDURE — 36416 COLLJ CAPILLARY BLOOD SPEC: CPT

## 2024-02-29 RX ORDER — WARFARIN SODIUM 6 MG/1
TABLET ORAL
Qty: 100 TABLET | Refills: 1 | Status: SHIPPED | OUTPATIENT
Start: 2024-02-29

## 2024-02-29 NOTE — PROGRESS NOTES
I have supervised and reviewed the notes, assessments, and/or procedures performed. I personally performed the assessment and implemented the plan. I concur with the documentation of this patient encounter.    Jessica Talbot, [facility]

## 2024-02-29 NOTE — PROGRESS NOTES
Anticoagulation Clinic Progress Note    Anticoagulation Summary  As of 2024      INR goal:  2.0-3.0   TTR:  60.0% (4 y)   INR used for dosin.9 (2024)   Warfarin maintenance plan:  9 mg every Tue; 6 mg all other days   Weekly warfarin total:  45 mg   No change documented:  Rochelle Meyers   Plan last modified:  Rani Dhillon, [facility] (2021)   Next INR check:  3/14/2024   Priority:  High   Target end date:  Indefinite    Indications    Atrial fibrillation with rapid ventricular response [I48.91]  Anticoagulated on Coumadin [Z79.01]                 Anticoagulation Episode Summary       INR check location:      Preferred lab:      Send INR reminders to:   JONAH DIAMOND [facility]    Comments:  New to warfarin 2/10; See  tele note for warfarin dosing hx. Afib + left atrial appendage thrombus at present; pending cardioversion upon resolution of CARRI thrombus.          Anticoagulation Care Providers       Provider Role Specialty Phone number    Gavin Luke MD Referring Cardiology [phone]            Clinic Interview:  Patient Findings     Positives:  Upcoming invasive procedure, Missed doses    Negatives:  Signs/symptoms of thrombosis, Signs/symptoms of bleeding,   Laboratory test error suspected, Change in health, Change in alcohol use,   Change in activity, Emergency department visit, Upcoming dental procedure,   Extra doses, Change in medications, Change in diet/appetite, Hospital   admission, Bruising, Other complaints      Clinical Outcomes     Negatives:  Major bleeding event, Thromboembolic event,   Anticoagulation-related hospital admission, Anticoagulation-related ED   visit, Anticoagulation-related fatality        INR History:      10/31/2023    10:15 AM 2023    12:00 AM 2023     9:15 AM 2023    11:30 AM 2024     9:30 AM 2024     8:45 AM 2024     9:00 AM   Anticoagulation Monitoring   INR 2.0  1.20 3.1 2.9 2.2 1.9   INR Date  10/31/2023  11/22/2023 11/29/2023 1/8/2024 1/22/2024 2/29/2024   INR Goal 2.0-3.0  2.0-3.0 2.0-3.0 2.0-3.0 2.0-3.0 2.0-3.0   Trend Same  Same Same Same Same Same   Last Week Total 45 mg  30 mg 54 mg 45 mg 45 mg 39 mg   Next Week Total 45 mg  51 mg 45 mg 45 mg 45 mg 45 mg   Sun 6 mg  6 mg 6 mg 6 mg 6 mg 6 mg   Mon 6 mg  6 mg 6 mg 6 mg 6 mg 6 mg   Tue 9 mg  9 mg 9 mg 9 mg 9 mg 9 mg   Wed 6 mg  9 mg (11/22) 6 mg 6 mg 6 mg 6 mg   Thu 6 mg  9 mg (11/23) 6 mg 6 mg 6 mg 6 mg   Fri 6 mg  6 mg 6 mg 6 mg 6 mg 6 mg   Sat 6 mg  6 mg 6 mg 6 mg 6 mg 6 mg   Historical INR  1.20            Visit Report Report             This result is from an external source.       Plan:  1. INR is out of range- see above in Anticoagulation Summary.   Will instruct Milton Moody to Continue  their warfarin regimen- see above in Anticoagulation Summary.  2. Follow up in 2 weeks.   3. Patient desires warfarin refills.  4. Verbal and written information provided. Patient expresses understanding and has no further questions at this time.    Rochelle Meyers

## 2024-03-14 ENCOUNTER — ANTICOAGULATION VISIT (OUTPATIENT)
Dept: PHARMACY | Facility: HOSPITAL | Age: 59
End: 2024-03-14
Payer: COMMERCIAL

## 2024-03-14 DIAGNOSIS — I48.91 ATRIAL FIBRILLATION WITH RAPID VENTRICULAR RESPONSE: Primary | ICD-10-CM

## 2024-03-14 DIAGNOSIS — Z79.01 ANTICOAGULATED ON COUMADIN: ICD-10-CM

## 2024-03-14 LAB
INR PPP: 5 (ref 0.91–1.09)
PROTHROMBIN TIME: 60.6 SECONDS (ref 10–13.8)

## 2024-03-14 PROCEDURE — 36416 COLLJ CAPILLARY BLOOD SPEC: CPT

## 2024-03-14 PROCEDURE — 85610 PROTHROMBIN TIME: CPT

## 2024-03-14 PROCEDURE — G0463 HOSPITAL OUTPT CLINIC VISIT: HCPCS

## 2024-03-14 NOTE — PROGRESS NOTES
Anticoagulation Clinic Progress Note    Anticoagulation Summary  As of 3/14/2024      INR goal:  2.0-3.0   TTR:  59.8% (4 y)   INR used for dosin.0 (3/14/2024)   Warfarin maintenance plan:  9 mg every Tue; 6 mg all other days   Weekly warfarin total:  45 mg   Plan last modified:  Rani Dhillon [facility] (2021)   Next INR check:  3/28/2024   Priority:  High   Target end date:  Indefinite    Indications    Atrial fibrillation with rapid ventricular response [I48.91]  Anticoagulated on Coumadin [Z79.01]                 Anticoagulation Episode Summary       INR check location:      Preferred lab:      Send INR reminders to:   [facility] DI[facility]OND [facility] [place]    Comments:  New to warfarin 2/10; See  tele note for warfarin dosing hx. Afib + left atrial appendage thrombus at present; pending cardioversion upon resolution of CARRI thrombus.          Anticoagulation Care Providers       Provider Role Specialty Phone number    Gavin Luke MD Referring Cardiology [phone]            Clinic Interview:  Patient Findings     Positives:  Change in alcohol use, Extra doses    Negatives:  Signs/symptoms of thrombosis, Signs/symptoms of bleeding,   Laboratory test error suspected, Change in health, Change in activity,   Upcoming invasive procedure, Emergency department visit, Upcoming dental   procedure, Missed doses, Change in medications, Change in diet/appetite,   Hospital admission, Bruising, Other complaints    Comments:  Patient reports increased alcohol consumption yesterday.   Reports he accidentally took 9 mg of warfarin yesterday rather than 6 mg.         Clinical Outcomes     Negatives:  Major bleeding event, Thromboembolic event,   Anticoagulation-related hospital admission, Anticoagulation-related ED   visit, Anticoagulation-related fatality    Comments:  Patient reports increased alcohol consumption yesterday.   Reports he accidentally took 9 mg of warfarin yesterday rather than 6 mg.         INR  History:      11/22/2023    12:00 AM 11/22/2023     9:15 AM 11/29/2023    11:30 AM 1/8/2024     9:30 AM 1/22/2024     8:45 AM 2/29/2024     9:00 AM 3/14/2024     9:00 AM   Anticoagulation Monitoring   INR  1.20 3.1 2.9 2.2 1.9 5.0   INR Date  11/22/2023 11/29/2023 1/8/2024 1/22/2024 2/29/2024 3/14/2024   INR Goal  2.0-3.0 2.0-3.0 2.0-3.0 2.0-3.0 2.0-3.0 2.0-3.0   Trend  Same Same Same Same Same Same   Last Week Total  30 mg 54 mg 45 mg 45 mg 39 mg 48 mg   Next Week Total  51 mg 45 mg 45 mg 45 mg 45 mg 33 mg   Sun  6 mg 6 mg 6 mg 6 mg 6 mg 6 mg   Mon  6 mg 6 mg 6 mg 6 mg 6 mg 6 mg   Tue  9 mg 9 mg 9 mg 9 mg 9 mg 9 mg   Wed  9 mg (11/22) 6 mg 6 mg 6 mg 6 mg 6 mg   Thu  9 mg (11/23) 6 mg 6 mg 6 mg 6 mg Hold (3/14); Otherwise 6 mg   Fri  6 mg 6 mg 6 mg 6 mg 6 mg Hold (3/15); Otherwise 6 mg   Sat  6 mg 6 mg 6 mg 6 mg 6 mg 6 mg   Historical INR 1.20                 This result is from an external source.       Plan:  1. INR is Supratherapeutic today- see above in Anticoagulation Summary.  Will instruct Milton Moody to Change their warfarin regimen- see above in Anticoagulation Summary. Hold today and tomorrow then cont same  2. Follow up in 1 week  3. Patient declines warfarin refills.  4. Verbal and written information provided. Patient expresses understanding and has no further questions at this time.    Ugo Hernandez, Pharmacy Intern

## 2024-03-14 NOTE — PROGRESS NOTES
I have supervised and reviewed the notes, assessments, and/or procedures performed. The documented assessment and plan were developed cooperatively, and the plan was implemented in my presence. I concur with the documentation of this patient encounter.    Diane Goodman [facility]

## 2024-03-21 ENCOUNTER — ANTICOAGULATION VISIT (OUTPATIENT)
Dept: PHARMACY | Facility: HOSPITAL | Age: 59
End: 2024-03-21
Payer: COMMERCIAL

## 2024-03-21 DIAGNOSIS — Z79.01 ANTICOAGULATED ON COUMADIN: ICD-10-CM

## 2024-03-21 DIAGNOSIS — I48.91 ATRIAL FIBRILLATION WITH RAPID VENTRICULAR RESPONSE: Primary | ICD-10-CM

## 2024-03-21 LAB
INR PPP: 2.3 (ref 0.91–1.09)
PROTHROMBIN TIME: 27.6 SECONDS (ref 10–13.8)

## 2024-03-21 PROCEDURE — G0463 HOSPITAL OUTPT CLINIC VISIT: HCPCS

## 2024-03-21 PROCEDURE — 85610 PROTHROMBIN TIME: CPT

## 2024-03-21 PROCEDURE — 36416 COLLJ CAPILLARY BLOOD SPEC: CPT

## 2024-03-21 NOTE — PROGRESS NOTES
Anticoagulation Clinic Progress Note    Anticoagulation Summary  As of 3/21/2024      INR goal:  2.0-3.0   TTR:  59.6% (4.1 y)   INR used for dosin.3 (3/21/2024)   Warfarin maintenance plan:  9 mg every Tue; 6 mg all other days   Weekly warfarin total:  45 mg   No change documented:  Rochelle Meyers   Plan last modified:  Rani Dhillon, [facility] (2021)   Next INR check:  2024   Priority:  High   Target end date:  Indefinite    Indications    Atrial fibrillation with rapid ventricular response [I48.91]  Anticoagulated on Coumadin [Z79.01]                 Anticoagulation Episode Summary       INR check location:      Preferred lab:      Send INR reminders to:   JONAH DIAMOND [facility]    Comments:  New to warfarin 2/10; See  tele note for warfarin dosing hx. Afib + left atrial appendage thrombus at present; pending cardioversion upon resolution of CARRI thrombus.          Anticoagulation Care Providers       Provider Role Specialty Phone number    Gavin Luke MD Referring Cardiology [phone]            Clinic Interview:  Patient Findings     Negatives:  Signs/symptoms of thrombosis, Signs/symptoms of bleeding,   Laboratory test error suspected, Change in health, Change in alcohol use,   Change in activity, Upcoming invasive procedure, Emergency department   visit, Upcoming dental procedure, Missed doses, Extra doses, Change in   medications, Change in diet/appetite, Hospital admission, Bruising, Other   complaints      Clinical Outcomes     Negatives:  Major bleeding event, Thromboembolic event,   Anticoagulation-related hospital admission, Anticoagulation-related ED   visit, Anticoagulation-related fatality        INR History:      2023     9:15 AM 2023    11:30 AM 2024     9:30 AM 2024     8:45 AM 2024     9:00 AM 3/14/2024     9:00 AM 3/21/2024     9:15 AM   Anticoagulation Monitoring   INR 1.20 3.1 2.9 2.2 1.9 5.0 2.3   INR Date 2023  11/29/2023 1/8/2024 1/22/2024 2/29/2024 3/14/2024 3/21/2024   INR Goal 2.0-3.0 2.0-3.0 2.0-3.0 2.0-3.0 2.0-3.0 2.0-3.0 2.0-3.0   Trend Same Same Same Same Same Same Same   Last Week Total 30 mg 54 mg 45 mg 45 mg 39 mg 48 mg 33 mg   Next Week Total 51 mg 45 mg 45 mg 45 mg 45 mg 33 mg 45 mg   Sun 6 mg 6 mg 6 mg 6 mg 6 mg 6 mg 6 mg   Mon 6 mg 6 mg 6 mg 6 mg 6 mg 6 mg 6 mg   Tue 9 mg 9 mg 9 mg 9 mg 9 mg 9 mg 9 mg   Wed 9 mg (11/22) 6 mg 6 mg 6 mg 6 mg 6 mg 6 mg   Thu 9 mg (11/23) 6 mg 6 mg 6 mg 6 mg Hold (3/14) 6 mg   Fri 6 mg 6 mg 6 mg 6 mg 6 mg Hold (3/15) 6 mg   Sat 6 mg 6 mg 6 mg 6 mg 6 mg 6 mg 6 mg       Plan:  1. INR is therapeutic today- see above in Anticoagulation Summary.   Will instruct Milton Moody to continue their warfarin regimen- see above in Anticoagulation Summary.  2. Follow up in 2 weeks.  3. Patient declines warfarin refills.  4. Verbal and written information provided. Patient expresses understanding and has no further questions at this time.    Rochelle Meyers

## 2024-03-22 NOTE — PROGRESS NOTES
I have supervised and reviewed the notes, assessments, and/or procedures performed. I personally performed the assessment and implemented the plan. I concur with the documentation of this patient encounter.    Jessica Talbot, [facility]

## 2024-04-04 ENCOUNTER — ANTICOAGULATION VISIT (OUTPATIENT)
Dept: PHARMACY | Facility: HOSPITAL | Age: 59
End: 2024-04-04
Payer: COMMERCIAL

## 2024-04-04 DIAGNOSIS — Z79.01 ANTICOAGULATED ON COUMADIN: ICD-10-CM

## 2024-04-04 DIAGNOSIS — I48.91 ATRIAL FIBRILLATION WITH RAPID VENTRICULAR RESPONSE: Primary | ICD-10-CM

## 2024-04-04 LAB
INR PPP: 2 (ref 0.91–1.09)
PROTHROMBIN TIME: 24 SECONDS (ref 10–13.8)

## 2024-04-04 PROCEDURE — 36416 COLLJ CAPILLARY BLOOD SPEC: CPT

## 2024-04-04 PROCEDURE — G0463 HOSPITAL OUTPT CLINIC VISIT: HCPCS

## 2024-04-04 PROCEDURE — 85610 PROTHROMBIN TIME: CPT

## 2024-04-04 NOTE — PROGRESS NOTES
Anticoagulation Clinic Progress Note    Anticoagulation Summary  As of 2024      INR goal:  2.0-3.0   TTR:  60.0% (4.1 y)   INR used for dosin.0 (2024)   Warfarin maintenance plan:  9 mg every Tue; 6 mg all other days   Weekly warfarin total:  45 mg   No change documented:  Rochelle Meyers   Plan last modified:  Rani Dhillon, [facility] (2021)   Next INR check:  2024   Priority:  High   Target end date:  Indefinite    Indications    Atrial fibrillation with rapid ventricular response [I48.91]  Anticoagulated on Coumadin [Z79.01]                 Anticoagulation Episode Summary       INR check location:      Preferred lab:      Send INR reminders to:   JONAH DIAMOND [facility]    Comments:  New to warfarin 2/10; See  tele note for warfarin dosing hx. Afib + left atrial appendage thrombus at present; pending cardioversion upon resolution of CARRI thrombus.          Anticoagulation Care Providers       Provider Role Specialty Phone number    aGvin Luke MD Referring Cardiology [phone]            Clinic Interview:  Patient Findings     Positives:  Upcoming invasive procedure    Negatives:  Signs/symptoms of thrombosis, Signs/symptoms of bleeding,   Laboratory test error suspected, Change in health, Change in alcohol use,   Change in activity, Emergency department visit, Upcoming dental procedure,   Missed doses, Extra doses, Change in medications, Change in diet/appetite,   Hospital admission, Bruising, Other complaints    Comments:  Shoulder surgery 24, patient getting his holding   instructions today, will call clinic.      Clinical Outcomes     Negatives:  Major bleeding event, Thromboembolic event,   Anticoagulation-related hospital admission, Anticoagulation-related ED   visit, Anticoagulation-related fatality    Comments:  Shoulder surgery 24, patient getting his holding   instructions today, will call clinic.        INR History:      2023    11:30 AM  1/8/2024     9:30 AM 1/22/2024     8:45 AM 2/29/2024     9:00 AM 3/14/2024     9:00 AM 3/21/2024     9:15 AM 4/4/2024     9:00 AM   Anticoagulation Monitoring   INR 3.1 2.9 2.2 1.9 5.0 2.3 2.0   INR Date 11/29/2023 1/8/2024 1/22/2024 2/29/2024 3/14/2024 3/21/2024 4/4/2024   INR Goal 2.0-3.0 2.0-3.0 2.0-3.0 2.0-3.0 2.0-3.0 2.0-3.0 2.0-3.0   Trend Same Same Same Same Same Same Same   Last Week Total 54 mg 45 mg 45 mg 39 mg 48 mg 33 mg 45 mg   Next Week Total 45 mg 45 mg 45 mg 45 mg 33 mg 45 mg 45 mg   Sun 6 mg 6 mg 6 mg 6 mg 6 mg 6 mg 6 mg   Mon 6 mg 6 mg 6 mg 6 mg 6 mg 6 mg 6 mg   Tue 9 mg 9 mg 9 mg 9 mg 9 mg 9 mg 9 mg   Wed 6 mg 6 mg 6 mg 6 mg 6 mg 6 mg 6 mg   Thu 6 mg 6 mg 6 mg 6 mg Hold (3/14) 6 mg 6 mg   Fri 6 mg 6 mg 6 mg 6 mg Hold (3/15) 6 mg 6 mg   Sat 6 mg 6 mg 6 mg 6 mg 6 mg 6 mg 6 mg       Plan:  1. INR is therapeutic today- see above in Anticoagulation Summary.   Will instruct Milton Moody to continue their warfarin regimen- see above in Anticoagulation Summary.  2. Follow up in 4 weeks.  3. Patient declines warfarin refills.  4. Verbal and written information provided. Patient expresses understanding and has no further questions at this time.    Rochelle Meyers

## 2024-04-04 NOTE — PROGRESS NOTES
I have supervised and reviewed the notes, assessments, and/or procedures performed. I personally performed the assessment and implemented the plan. I concur with the documentation of this patient encounter.    Jimi Reid, PharmD

## 2024-04-26 ENCOUNTER — TELEPHONE (OUTPATIENT)
Dept: CARDIOLOGY | Facility: CLINIC | Age: 59
End: 2024-04-26
Payer: COMMERCIAL

## 2024-04-26 NOTE — TELEPHONE ENCOUNTER
Pt is schedule for a right reverse total shoulder arthroplasty on 5/7/24 with .      Is pt clear for surgery?     LOV: 8/23/23    PT#: [phone]

## 2024-05-01 ENCOUNTER — OFFICE VISIT (OUTPATIENT)
Dept: CARDIOLOGY | Facility: CLINIC | Age: 59
End: 2024-05-01
Payer: COMMERCIAL

## 2024-05-01 VITALS
HEIGHT: 69 IN | WEIGHT: 315 LBS | DIASTOLIC BLOOD PRESSURE: 86 MMHG | OXYGEN SATURATION: 99 % | SYSTOLIC BLOOD PRESSURE: 160 MMHG | BODY MASS INDEX: 46.65 KG/M2 | HEART RATE: 100 BPM

## 2024-05-01 DIAGNOSIS — I42.8 NICM (NONISCHEMIC CARDIOMYOPATHY): ICD-10-CM

## 2024-05-01 DIAGNOSIS — I44.7 LBBB (LEFT BUNDLE BRANCH BLOCK): ICD-10-CM

## 2024-05-01 DIAGNOSIS — Z01.810 PREOPERATIVE CARDIOVASCULAR EXAMINATION: ICD-10-CM

## 2024-05-01 DIAGNOSIS — I48.19 ATRIAL FIBRILLATION, PERSISTENT: Primary | ICD-10-CM

## 2024-05-01 DIAGNOSIS — E66.01 MORBIDLY OBESE: ICD-10-CM

## 2024-05-01 DIAGNOSIS — E78.2 MIXED HYPERLIPIDEMIA: ICD-10-CM

## 2024-05-01 RX ORDER — METOPROLOL SUCCINATE 100 MG/1
100 TABLET, EXTENDED RELEASE ORAL 2 TIMES DAILY
Qty: 180 TABLET | Refills: 3 | Status: SHIPPED | OUTPATIENT
Start: 2024-05-01

## 2024-05-01 NOTE — PROGRESS NOTES
PATIENTINFORMATION    Date of Office Visit: 2024  Encounter Provider: Gavin Luke MD  Place of Service: [facility] CARDIOLOGY  Patient Name: Milton Moody  : 1965    Subjective:     Encounter Date:2024      Patient ID: Milton Moody is a 58 y.o. male.    Chief Complaint   Patient presents with    Surgical Clearance     Shoulder surgery      HPI  Mr. Moody is a pleasant 58-year-old gentleman who came to cardiology clinic for follow-up visit as well as preoperative cardiovascular examination for right shoulder surgery.  He has chronic degenerative joint disease interfering with activities but he can still walk for about 15 minutes without stopping.  Denies exertional chest discomfort but reports some shortness of breath that has not changed much.  No significant extremity swelling or weight change since last clinic visit.  He takes daily Lasix.  He reports being compliant with all his medications and reports blood pressure running a little above normal.  No bleeding on Coumadin.      ROS  All systems reviewed and negative except as noted in HPI.    Past Medical History:   Diagnosis Date    Abnormal ECG     Asthma     Atrial fibrillation     Chest pain     CKD (chronic kidney disease) stage 3, GFR 30-59 ml/min 3/4/2020    Hyperlipidemia     Hypertension     LBBB (left bundle branch block) 10/31/2023    NICM (nonischemic cardiomyopathy) 3/4/2020    Nonischemic cardiomyopathy     Palpitations     Rapid heart rate     Sleep apnea        Past Surgical History:   Procedure Laterality Date    KNEE SURGERY Left 2019    SHOULDER ROTATOR CUFF REPAIR Right 2020       Social History     Socioeconomic History    Marital status:    Tobacco Use    Smoking status: Former     Current packs/day: 0.10     Average packs/day: 0.1 packs/day for 3.0 years (0.3 ttl pk-yrs)     Types: Cigarettes    Smokeless tobacco: Former     Types: Snuff   Vaping Use    Vaping status: Never Used  "  Substance and Sexual Activity    Alcohol use: Yes     Alcohol/week: 6.0 standard drinks of alcohol     Types: 6 Cans of beer per week     Comment: weekend alcohol use    Drug use: Not Currently     Types: Marijuana    Sexual activity: Not Currently     Partners: Female       Family History   Problem Relation Age of Onset    Hypertension Mother     Hypertension Sister          Procedures       Objective:     /86   Pulse 100   Ht 175.3 cm (69\")   Wt (!) 181 kg (398 lb)   SpO2 99%   BMI 58.77 kg/m²  Body mass index is 58.77 kg/m².     Constitutional:       General: Not in acute distress.     Appearance: Morbidly obese. Not diaphoretic.   Eyes:      Pupils: Pupils are equal, round, and reactive to light.   HENT:      Head: Normocephalic and atraumatic.   Neck:      Thyroid: No thyromegaly.   Pulmonary:      Effort: Pulmonary effort is normal. No respiratory distress.      Breath sounds: Normal breath sounds. No wheezing. No rales.   Chest:      Chest wall: Not tender to palpatation.   Cardiovascular:      Normal rate. Irregularly irregular rhythm.      No gallop.    Pulses:     Intact distal pulses.   Edema:     Peripheral edema absent.   Abdominal:      General: Bowel sounds are normal. There is no distension.      Palpations: Abdomen is soft.      Tenderness: There is no guarding.   Musculoskeletal: Normal range of motion.         General: No deformity.      Cervical back: Normal range of motion and neck supple. Skin:     General: Skin is warm and dry.      Findings: No rash.   Neurological:      Mental Status: Alert and oriented to person, place, and time.      Cranial Nerves: No cranial nerve deficit.      Deep Tendon Reflexes: Reflexes are normal and symmetric.   Psychiatric:         Judgment: Judgment normal.         Review Of Data: I have reviewed pertinent recent labs, images and documents and pertinent findings included in HPI or assessment below.          Assessment/Plan:       Persistent atrial " fibrillation with underlying Left bundle branch block unchanged/nonspecific intraventricular conduction delay on some of the EKGs:  on metoprolol and Coumadin  Non ischemic cardiomyopathy-Myocardial perfusion study on 5/7/2021 with no evidence for ischemia and left ventricular ejection fraction of 21%. Echo in May 2021 with estimated left ventricular ejection fraction of 41-45%. Repeat echo in 02/2023 with LVEF of  41-45%.  Repeat echo in September 2023 with LVEF of 31 to 35%.  Metoprolol tartrate switched to succinate.  And already on Entresto.  Hypertension  Morbid obesity with complications/obstructive sleep apnea on CPAP-no significant weight change on Ozempic.  Most of his medical problems are complications of obesity.  Placed referral to bariatric surgery.   Hypercholesterolemia-on a statin-panel at goal  Chronic kidney disease-stable creatinine   Pulmonary nodule-  follows up with pulmonary  Degenerative joint disease and prior surgery-pending repeat right shoulder surgery.    Blood pressure and heart rate slightly above goal.  I have increased metoprolol from 50 to 100 mg p.o. twice daily.  He is euvolemic on exam without and any active cardiac condition that needs immediate treatment.  He can walk for 15 minutes but usually stops because of joint pains and some shortness of breath.  He is overall intermediate risk for perioperative cardiovascular complications during general anesthesia but not prohibitive.  He does not need any further cardiac specific testing as most risk factors are nonmodifiable.  Continue beta-blocker and diuretic in the perioperative period including metoprolol in the morning of surgery with sips of water..  Can hold Coumadin for 5 days prior to surgery and start taking aspirin 81 mg p.o. daily. Resume coumadin as soon as possible in postop period.  Follow-up in cardiology clinic in 6 months or sooner with any concerning symptoms.    Diagnosis and plan of care discussed with patient  and verbalized understanding.            Your medication list            Accurate as of May 1, 2024  9:32 AM. If you have any questions, ask your nurse or doctor.                CHANGE how you take these medications        Instructions Last Dose Given Next Dose Due   metoprolol succinate  MG 24 hr tablet  Commonly known as: TOPROL-XL  What changed: how much to take  Changed by: Gavin Luke MD      Take 1 tablet by mouth 2 (Two) Times a Day.              CONTINUE taking these medications        Instructions Last Dose Given Next Dose Due   atorvastatin 40 MG tablet  Commonly known as: LIPITOR      Take 1 tablet by mouth Every Night.       digoxin 250 MCG tablet  Commonly known as: Lanoxin      Take 1 tablet by mouth Daily.       Entresto 49-51 MG tablet  Generic drug: sacubitril-valsartan      Take 1 tablet by mouth 2 (Two) Times a Day.       famotidine 40 MG tablet  Commonly known as: PEPCID      Take 0.5 tablets by mouth Daily.       furosemide 40 MG tablet  Commonly known as: LASIX      TAKE 1 TABLET BY MOUTH DAILY       metFORMIN 500 MG tablet  Commonly known as: GLUCOPHAGE      Take 1 tablet by mouth 2 (Two) Times a Day.       nitroglycerin 0.4 MG SL tablet  Commonly known as: NITROSTAT      Place 1 tablet under the tongue Every 5 (Five) Minutes As Needed.       potassium chloride ER 20 MEQ tablet controlled-release ER tablet  Commonly known as: K-TAB      TAKE ONE TABLET BY MOUTH TWICE A DAY       Semaglutide(0.25 or 0.5MG/DOS) 2 MG/1.5ML solution pen-injector  Commonly known as: OZEMPIC      Inject 0.25 mg under the skin into the appropriate area as directed.       warfarin 6 MG tablet  Commonly known as: COUMADIN      TAKE 1 AND 1/2 TABLETS (9MG)  BY MOUTH ON TUESDAYS, AND TAKE 1 TABLET  (6 MG) ALL OTHER DAYS OR AS DIRECTED                 Where to Get Your Medications        These medications were sent to [facility]/pharmacy #[id number] - [place], [place] - [address] AT [facility] -  [phone] [facility] [phone] [address], [place] [address]      Phone: [phone]   metoprolol succinate  MG 24 hr tablet             Gavin Luke MD  05/01/24  09:32 EDT

## 2024-05-07 ENCOUNTER — ANTICOAGULATION VISIT (OUTPATIENT)
Dept: PHARMACY | Facility: HOSPITAL | Age: 59
End: 2024-05-07
Payer: COMMERCIAL

## 2024-05-07 DIAGNOSIS — I48.91 ATRIAL FIBRILLATION WITH RAPID VENTRICULAR RESPONSE: Primary | ICD-10-CM

## 2024-05-07 DIAGNOSIS — Z79.01 ANTICOAGULATED ON COUMADIN: ICD-10-CM

## 2024-05-13 ENCOUNTER — ANTICOAGULATION VISIT (OUTPATIENT)
Dept: PHARMACY | Facility: HOSPITAL | Age: 59
End: 2024-05-13
Payer: COMMERCIAL

## 2024-05-13 DIAGNOSIS — I48.91 ATRIAL FIBRILLATION WITH RAPID VENTRICULAR RESPONSE: Primary | ICD-10-CM

## 2024-05-13 DIAGNOSIS — Z79.01 ANTICOAGULATED ON COUMADIN: ICD-10-CM

## 2024-05-13 LAB
INR PPP: 2.1 (ref 0.91–1.09)
PROTHROMBIN TIME: 25.8 SECONDS (ref 10–13.8)

## 2024-05-13 PROCEDURE — 36416 COLLJ CAPILLARY BLOOD SPEC: CPT

## 2024-05-13 PROCEDURE — 85610 PROTHROMBIN TIME: CPT

## 2024-05-13 PROCEDURE — G0463 HOSPITAL OUTPT CLINIC VISIT: HCPCS

## 2024-05-13 NOTE — PROGRESS NOTES
Anticoagulation Clinic Progress Note    Anticoagulation Summary  As of 2024      INR goal:  2.0-3.0   TTR:  61.0% (4.2 y)   INR used for dosin.1 (2024)   Warfarin maintenance plan:  9 mg every Tue; 6 mg all other days   Weekly warfarin total:  45 mg   No change documented:  Luis Manuel Birch, Pharmacy Intern   Plan last modified:  Rani Dhillon [facility] (2021)   Next INR check:  2024   Priority:  High   Target end date:  Indefinite    Indications    Atrial fibrillation with rapid ventricular response [I48.91]  Anticoagulated on Coumadin [Z79.01]                 Anticoagulation Episode Summary       INR check location:      Preferred lab:      Send INR reminders to:   JONAH DIAMOND [facility]    Comments:  New to warfarin 2/10; See  tele note for warfarin dosing hx. Afib + left atrial appendage thrombus at present; pending cardioversion upon resolution of CARRI thrombus.          Anticoagulation Care Providers       Provider Role Specialty Phone number    Gaivn Luke MD Referring Cardiology [phone]            Clinic Interview:  Patient Findings     Positives:  Change in health, Upcoming invasive procedure    Negatives:  Signs/symptoms of thrombosis, Signs/symptoms of bleeding,   Laboratory test error suspected, Change in alcohol use, Change in   activity, Emergency department visit, Upcoming dental procedure, Missed   doses, Extra doses, Change in medications, Change in diet/appetite,   Hospital admission, Bruising, Other complaints    Comments:  Patient reports that he has been in the process of   rescheduling his shoulder surgery. Patient agreed to contact us with any   updates.      Clinical Outcomes     Negatives:  Major bleeding event, Thromboembolic event,   Anticoagulation-related hospital admission, Anticoagulation-related ED   visit, Anticoagulation-related fatality    Comments:  Patient reports that he has been in the process of   rescheduling his shoulder surgery.  Patient agreed to contact us with any   updates.        INR History:      1/22/2024     8:45 AM 2/29/2024     9:00 AM 3/14/2024     9:00 AM 3/21/2024     9:15 AM 4/4/2024     9:00 AM 5/7/2024     9:27 AM 5/13/2024     9:30 AM   Anticoagulation Monitoring   INR 2.2 1.9 5.0 2.3 2.0 1.8 2.1   INR Date 1/22/2024 2/29/2024 3/14/2024 3/21/2024 4/4/2024 5/7/2024 5/13/2024   INR Goal 2.0-3.0 2.0-3.0 2.0-3.0 2.0-3.0 2.0-3.0 2.0-3.0 2.0-3.0   Trend Same Same Same Same Same Same Same   Last Week Total 45 mg 39 mg 48 mg 33 mg 45 mg 21 mg 42 mg   Next Week Total 45 mg 45 mg 33 mg 45 mg 45 mg 48 mg 45 mg   Sun 6 mg 6 mg 6 mg 6 mg 6 mg 6 mg 6 mg   Mon 6 mg 6 mg 6 mg 6 mg 6 mg - 6 mg   Tue 9 mg 9 mg 9 mg 9 mg 9 mg 9 mg 9 mg   Wed 6 mg 6 mg 6 mg 6 mg 6 mg 9 mg (5/8) 6 mg   Thu 6 mg 6 mg Hold (3/14) 6 mg 6 mg 6 mg 6 mg   Fri 6 mg 6 mg Hold (3/15) 6 mg 6 mg 6 mg 6 mg   Sat 6 mg 6 mg 6 mg 6 mg 6 mg 6 mg 6 mg       Plan:  1. INR is Therapeutic today- see above in Anticoagulation Summary.  Will instruct Milton Moody to Continue their warfarin regimen- see above in Anticoagulation Summary.  2. Follow up in 2 weeks  3. Patient declines warfarin refills.  4. Verbal and written information provided. Patient expresses understanding and has no further questions at this time.    Luis Manuel Birch, Pharmacy Intern

## 2024-05-13 NOTE — PROGRESS NOTES
I have supervised and reviewed the notes, assessments, and/or procedures performed. The documented assessment and plan were developed cooperatively, and the plan was implemented in my presence. I concur with the documentation of this patient encounter.    Jimi Reid, PharmD

## 2024-05-14 RX ORDER — WARFARIN SODIUM 6 MG/1
TABLET ORAL
Qty: 100 TABLET | Refills: 0 | Status: SHIPPED | OUTPATIENT
Start: 2024-05-14

## 2024-05-28 ENCOUNTER — ANTICOAGULATION VISIT (OUTPATIENT)
Dept: PHARMACY | Facility: HOSPITAL | Age: 59
End: 2024-05-28
Payer: COMMERCIAL

## 2024-05-28 DIAGNOSIS — I48.91 ATRIAL FIBRILLATION WITH RAPID VENTRICULAR RESPONSE: Primary | ICD-10-CM

## 2024-05-28 DIAGNOSIS — Z79.01 ANTICOAGULATED ON COUMADIN: ICD-10-CM

## 2024-05-28 LAB
INR PPP: 2.2 (ref 0.91–1.09)
PROTHROMBIN TIME: 26.9 SECONDS (ref 10–13.8)

## 2024-05-28 PROCEDURE — G0463 HOSPITAL OUTPT CLINIC VISIT: HCPCS

## 2024-05-28 PROCEDURE — 36416 COLLJ CAPILLARY BLOOD SPEC: CPT

## 2024-05-28 PROCEDURE — 85610 PROTHROMBIN TIME: CPT

## 2024-05-28 NOTE — PROGRESS NOTES
Anticoagulation Clinic Progress Note    Anticoagulation Summary  As of 2024      INR goal:  2.0-3.0   TTR:  61.4% (4.3 y)   INR used for dosin.2 (2024)   Warfarin maintenance plan:  9 mg every Tue; 6 mg all other days   Weekly warfarin total:  45 mg   No change documented:  Luis Manuel Birch, Pharmacy Intern   Plan last modified:  Rani Dhillon [facility] (2021)   Next INR check:  2024   Priority:  High   Target end date:  Indefinite    Indications    Atrial fibrillation with rapid ventricular response [I48.91]  Anticoagulated on Coumadin [Z79.01]                 Anticoagulation Episode Summary       INR check location:      Preferred lab:      Send INR reminders to:   JONAH DIAMOND [facility]    Comments:  New to warfarin 2/10; See  tele note for warfarin dosing hx. Afib + left atrial appendage thrombus at present; pending cardioversion upon resolution of CARRI thrombus.          Anticoagulation Care Providers       Provider Role Specialty Phone number    Gavin Luke MD Referring Cardiology [phone]            Clinic Interview:  Patient Findings     Positives:  Upcoming invasive procedure    Negatives:  Signs/symptoms of thrombosis, Signs/symptoms of bleeding,   Laboratory test error suspected, Change in health, Change in alcohol use,   Change in activity, Emergency department visit, Upcoming dental procedure,   Missed doses, Extra doses, Change in medications, Change in diet/appetite,   Hospital admission, Bruising, Other complaints    Comments:  Patient reports that his shoulder surgery has been rescheduled   for 24.       Clinical Outcomes     Negatives:  Major bleeding event, Thromboembolic event,   Anticoagulation-related hospital admission, Anticoagulation-related ED   visit, Anticoagulation-related fatality    Comments:  Patient reports that his shoulder surgery has been rescheduled   for 24.         INR History:      2024     9:00 AM 3/14/2024     9:00  AM 3/21/2024     9:15 AM 4/4/2024     9:00 AM 5/7/2024     9:27 AM 5/13/2024     9:30 AM 5/28/2024     9:30 AM   Anticoagulation Monitoring   INR 1.9 5.0 2.3 2.0 1.8 2.1 2.2   INR Date 2/29/2024 3/14/2024 3/21/2024 4/4/2024 5/7/2024 5/13/2024 5/28/2024   INR Goal 2.0-3.0 2.0-3.0 2.0-3.0 2.0-3.0 2.0-3.0 2.0-3.0 2.0-3.0   Trend Same Same Same Same Same Same Same   Last Week Total 39 mg 48 mg 33 mg 45 mg 21 mg 42 mg 45 mg   Next Week Total 45 mg 33 mg 45 mg 45 mg 48 mg 45 mg 45 mg   Sun 6 mg 6 mg 6 mg 6 mg 6 mg 6 mg 6 mg   Mon 6 mg 6 mg 6 mg 6 mg - 6 mg 6 mg   Tue 9 mg 9 mg 9 mg 9 mg 9 mg 9 mg 9 mg   Wed 6 mg 6 mg 6 mg 6 mg 9 mg (5/8) 6 mg 6 mg   Thu 6 mg Hold (3/14) 6 mg 6 mg 6 mg 6 mg 6 mg   Fri 6 mg Hold (3/15) 6 mg 6 mg 6 mg 6 mg 6 mg   Sat 6 mg 6 mg 6 mg 6 mg 6 mg 6 mg 6 mg       Plan:  1. INR is Therapeutic today- see above in Anticoagulation Summary.  Will instruct Milton Moody to Continue their warfarin regimen- see above in Anticoagulation Summary.  2. Follow up in 4 weeks  3. Patient declines warfarin refills.  4. Verbal and written information provided. Patient expresses understanding and has no further questions at this time.    Luis Manuel Birch, Pharmacy Intern

## 2024-06-05 RX ORDER — WARFARIN SODIUM 6 MG/1
TABLET ORAL
Qty: 100 TABLET | Refills: 1 | Status: SHIPPED | OUTPATIENT
Start: 2024-06-05

## 2024-07-12 ENCOUNTER — ANTICOAGULATION VISIT (OUTPATIENT)
Dept: PHARMACY | Facility: HOSPITAL | Age: 59
End: 2024-07-12
Payer: COMMERCIAL

## 2024-07-12 DIAGNOSIS — Z79.01 ANTICOAGULATED ON COUMADIN: ICD-10-CM

## 2024-07-12 DIAGNOSIS — I48.91 ATRIAL FIBRILLATION WITH RAPID VENTRICULAR RESPONSE: Primary | ICD-10-CM

## 2024-07-12 LAB
INR PPP: 1.9 (ref 0.91–1.09)
PROTHROMBIN TIME: 22.5 SECONDS (ref 10–13.8)

## 2024-07-12 PROCEDURE — G0463 HOSPITAL OUTPT CLINIC VISIT: HCPCS | Performed by: PHARMACIST

## 2024-07-12 PROCEDURE — 85610 PROTHROMBIN TIME: CPT

## 2024-07-12 PROCEDURE — 36416 COLLJ CAPILLARY BLOOD SPEC: CPT

## 2024-07-12 NOTE — PROGRESS NOTES
Anticoagulation Clinic Progress Note    Anticoagulation Summary  As of 2024      INR goal:  2.0-3.0   TTR:  61.5% (4.4 y)   INR used for dosin.9 (2024)   Warfarin maintenance plan:  9 mg every Tue; 6 mg all other days   Weekly warfarin total:  45 mg   No change documented:  Asia Darby [facility]   Plan last modified:  Rani Dhillon [facility] (2021)   Next INR check:  2024   Priority:  High   Target end date:  Indefinite    Indications    Atrial fibrillation with rapid ventricular response [I48.91]  Anticoagulated on Coumadin [Z79.01]                 Anticoagulation Episode Summary       INR check location:      Preferred lab:      Send INR reminders to:   JONAH DIAMOND [facility]    Comments:  New to warfarin 2/10; See  tele note for warfarin dosing hx. Afib + left atrial appendage thrombus at present; pending cardioversion upon resolution of CARRI thrombus.          Anticoagulation Care Providers       Provider Role Specialty Phone number    Gavin Luke MD Referring Cardiology [phone]            Clinic Interview:  Patient Findings     Positives:  Change in diet/appetite    Negatives:  Signs/symptoms of thrombosis, Signs/symptoms of bleeding,   Laboratory test error suspected, Change in health, Change in alcohol use,   Change in activity, Upcoming invasive procedure, Emergency department   visit, Upcoming dental procedure, Missed doses, Extra doses, Change in   medications, Hospital admission, Bruising, Other complaints    Comments:  Patient reports starting a new supplement two weeks ago, and a   slightly decreased appetite.      Clinical Outcomes     Negatives:  Major bleeding event, Thromboembolic event,   Anticoagulation-related hospital admission, Anticoagulation-related ED   visit, Anticoagulation-related fatality    Comments:  Patient reports starting a new supplement two weeks ago, and a   slightly decreased appetite.        INR History:      3/14/2024     9:00 AM  3/21/2024     9:15 AM 4/4/2024     9:00 AM 5/7/2024     9:27 AM 5/13/2024     9:30 AM 5/28/2024     9:30 AM 7/12/2024     9:00 AM   Anticoagulation Monitoring   INR 5.0 2.3 2.0 1.8 2.1 2.2 1.9   INR Date 3/14/2024 3/21/2024 4/4/2024 5/7/2024 5/13/2024 5/28/2024 7/12/2024   INR Goal 2.0-3.0 2.0-3.0 2.0-3.0 2.0-3.0 2.0-3.0 2.0-3.0 2.0-3.0   Trend Same Same Same Same Same Same Same   Last Week Total 48 mg 33 mg 45 mg 21 mg 42 mg 45 mg 45 mg   Next Week Total 33 mg 45 mg 45 mg 48 mg 45 mg 45 mg 45 mg   Sun 6 mg 6 mg 6 mg 6 mg 6 mg 6 mg 6 mg   Mon 6 mg 6 mg 6 mg - 6 mg 6 mg 6 mg   Tue 9 mg 9 mg 9 mg 9 mg 9 mg 9 mg 9 mg   Wed 6 mg 6 mg 6 mg 9 mg (5/8) 6 mg 6 mg 6 mg   Thu Hold (3/14) 6 mg 6 mg 6 mg 6 mg 6 mg 6 mg   Fri Hold (3/15) 6 mg 6 mg 6 mg 6 mg 6 mg 6 mg   Sat 6 mg 6 mg 6 mg 6 mg 6 mg 6 mg 6 mg       Plan:  1. INR is Subtherapeutic today- see above in Anticoagulation Summary.  Will instruct Milton Moody to Continue their warfarin regimen- see above in Anticoagulation Summary.  2. Follow up in 2 weeks  3. Patient declines warfarin refills.  4. Verbal and written information provided. Patient expresses understanding and has no further questions at this time.    Asia Darby [facility]

## 2024-07-25 ENCOUNTER — ANTICOAGULATION VISIT (OUTPATIENT)
Dept: PHARMACY | Facility: HOSPITAL | Age: 59
End: 2024-07-25
Payer: COMMERCIAL

## 2024-07-25 DIAGNOSIS — I48.91 ATRIAL FIBRILLATION WITH RAPID VENTRICULAR RESPONSE: Primary | ICD-10-CM

## 2024-07-25 DIAGNOSIS — Z79.01 ANTICOAGULATED ON COUMADIN: ICD-10-CM

## 2024-07-25 LAB
INR PPP: 2.6 (ref 0.91–1.09)
PROTHROMBIN TIME: 30.9 SECONDS (ref 10–13.8)

## 2024-07-25 PROCEDURE — G0463 HOSPITAL OUTPT CLINIC VISIT: HCPCS

## 2024-07-25 PROCEDURE — 36416 COLLJ CAPILLARY BLOOD SPEC: CPT

## 2024-07-25 PROCEDURE — 85610 PROTHROMBIN TIME: CPT

## 2024-07-25 RX ORDER — WARFARIN SODIUM 6 MG/1
TABLET ORAL
Qty: 100 TABLET | Refills: 1 | Status: SHIPPED | OUTPATIENT
Start: 2024-07-25

## 2024-07-25 NOTE — PROGRESS NOTES
I have supervised and reviewed the notes, assessments, and/or procedures performed. I personally performed the assessment and implemented the plan. I concur with the documentation of this patient encounter.    Mercedes Hsu, JasperD

## 2024-07-25 NOTE — PROGRESS NOTES
Anticoagulation Clinic Progress Note    Anticoagulation Summary  As of 2024      INR goal:  2.0-3.0   TTR:  61.7% (4.4 y)   INR used for dosin.6 (2024)   Warfarin maintenance plan:  9 mg every Tue; 6 mg all other days   Weekly warfarin total:  45 mg   Plan last modified:  Rani Dhillon [facility] (2021)   Next INR check:  2024   Priority:  High   Target end date:  Indefinite    Indications    Atrial fibrillation with rapid ventricular response [I48.91]  Anticoagulated on Coumadin [Z79.01]                 Anticoagulation Episode Summary       INR check location:      Preferred lab:      Send INR reminders to:   JONAH DIAMOND [facility]    Comments:  New to warfarin 2/10; See  tele note for warfarin dosing hx. Afib + left atrial appendage thrombus at present; pending cardioversion upon resolution of CARRI thrombus.          Anticoagulation Care Providers       Provider Role Specialty Phone number    Gavin Luke MD Referring Cardiology [phone]            Clinic Interview:  Patient Findings     Positives:  Upcoming invasive procedure    Negatives:  Signs/symptoms of thrombosis, Signs/symptoms of bleeding,   Laboratory test error suspected, Change in health, Change in alcohol use,   Change in activity, Emergency department visit, Upcoming dental procedure,   Missed doses, Extra doses, Change in medications, Change in diet/appetite,   Hospital admission, Bruising, Other complaints    Comments:  Shoulder surgery is scheduled for 24 and he will be   holding warfarin 5 days prior.      Clinical Outcomes     Negatives:  Major bleeding event, Thromboembolic event,   Anticoagulation-related hospital admission, Anticoagulation-related ED   visit, Anticoagulation-related fatality        INR History:      3/21/2024     9:15 AM 2024     9:00 AM 2024     9:27 AM 2024     9:30 AM 2024     9:30 AM 2024     9:00 AM 2024     8:45 AM   Anticoagulation Monitoring    INR 2.3 2.0 1.8 2.1 2.2 1.9 2.6   INR Date 3/21/2024 4/4/2024 5/7/2024 5/13/2024 5/28/2024 7/12/2024 7/25/2024   INR Goal 2.0-3.0 2.0-3.0 2.0-3.0 2.0-3.0 2.0-3.0 2.0-3.0 2.0-3.0   Trend Same Same Same Same Same Same Same   Last Week Total 33 mg 45 mg 21 mg 42 mg 45 mg 45 mg 45 mg   Next Week Total 45 mg 45 mg 48 mg 45 mg 45 mg 45 mg 45 mg   Sun 6 mg 6 mg 6 mg 6 mg 6 mg 6 mg 6 mg   Mon 6 mg 6 mg - 6 mg 6 mg 6 mg 6 mg   Tue 9 mg 9 mg 9 mg 9 mg 9 mg 9 mg 9 mg   Wed 6 mg 6 mg 9 mg (5/8) 6 mg 6 mg 6 mg 6 mg   Thu 6 mg 6 mg 6 mg 6 mg 6 mg 6 mg 6 mg   Fri 6 mg 6 mg 6 mg 6 mg 6 mg 6 mg 6 mg   Sat 6 mg 6 mg 6 mg 6 mg 6 mg 6 mg 6 mg       Plan:  1. INR is therapeutic today- see above in Anticoagulation Summary.   Will instruct Milton Moody to continue their warfarin regimen- see above in Anticoagulation Summary.  2. Follow up in 3 weeks.  3. Patient declines warfarin refills.  4. Verbal and written information provided. Patient expresses understanding and has no further questions at this time.    Marlene Mo, Pharmacy Technician

## 2024-07-26 ENCOUNTER — APPOINTMENT (OUTPATIENT)
Dept: PHARMACY | Facility: HOSPITAL | Age: 59
End: 2024-07-26
Payer: COMMERCIAL

## 2024-08-01 ENCOUNTER — OFFICE VISIT (OUTPATIENT)
Age: 59
End: 2024-08-01
Payer: COMMERCIAL

## 2024-08-01 VITALS
HEIGHT: 69 IN | SYSTOLIC BLOOD PRESSURE: 124 MMHG | BODY MASS INDEX: 46.65 KG/M2 | WEIGHT: 315 LBS | DIASTOLIC BLOOD PRESSURE: 82 MMHG | HEART RATE: 82 BPM

## 2024-08-01 DIAGNOSIS — I42.8 NICM (NONISCHEMIC CARDIOMYOPATHY): Primary | ICD-10-CM

## 2024-08-01 DIAGNOSIS — I48.19 ATRIAL FIBRILLATION, PERSISTENT: ICD-10-CM

## 2024-08-01 PROBLEM — I48.0 PAROXYSMAL ATRIAL FIBRILLATION WITH RAPID VENTRICULAR RESPONSE: Status: RESOLVED | Noted: 2020-02-07 | Resolved: 2024-08-01

## 2024-08-01 PROCEDURE — 93000 ELECTROCARDIOGRAM COMPLETE: CPT | Performed by: INTERNAL MEDICINE

## 2024-08-01 PROCEDURE — 99214 OFFICE O/P EST MOD 30 MIN: CPT | Performed by: INTERNAL MEDICINE

## 2024-08-01 NOTE — PROGRESS NOTES
Date of Office Visit: 2024  Encounter Provider: Moris Livingston MD  Place of Service: [facility] CARDIOLOGY  Patient Name: Milton Moody  :1965    Atrial fibrillation    HPI: Milton Moody is a 58 y.o. male who presents today for atrial fibrillation, nonischemic cardiomyopathy    He has been off work for the past 9 months due to rotator cuff, but also suspect due to symptoms of heart failure.    He has been in atrial fibrillation for more than 5 years, I do not think pursuing rhythm control is reasonable.    It sounds like he has NYHA class III to NYHA class IV symptoms    Ejection fraction has been variable over the years.    He was to [facility] and [facility] after last visit, but sounds like he only recently started this medication.          Past Medical History:   Diagnosis Date    Abnormal ECG     Asthma     Atrial fibrillation     Chest pain     CKD (chronic kidney disease) stage 3, GFR 30-59 ml/min 3/4/2020    Hyperlipidemia     Hypertension     LBBB (left bundle branch block) 10/31/2023    NICM (nonischemic cardiomyopathy) 3/4/2020    Nonischemic cardiomyopathy     Palpitations     Rapid heart rate     Sleep apnea        Past Surgical History:   Procedure Laterality Date    KNEE SURGERY Left 2019    SHOULDER ROTATOR CUFF REPAIR Right 2020       Social History     Socioeconomic History    Marital status:    Tobacco Use    Smoking status: Former     Current packs/day: 0.10     Average packs/day: 0.1 packs/day for 3.0 years (0.3 ttl pk-yrs)     Types: Cigarettes     Passive exposure: Past    Smokeless tobacco: Former     Types: Snuff   Vaping Use    Vaping status: Never Used   Substance and Sexual Activity    Alcohol use: Yes     Alcohol/week: 6.0 standard drinks of alcohol     Types: 6 Cans of beer per week     Comment: weekend alcohol use    Drug use: Not Currently     Types: Marijuana    Sexual activity: Not Currently     Partners: Female       Family History    Problem Relation Age of Onset    Hypertension Mother     Hypertension Sister        Review of Systems   Constitutional: Negative.   Cardiovascular: Negative.    Respiratory: Negative.     Gastrointestinal: Negative.        Allergies   Allergen Reactions    Pollen Extract Other (See Comments)    Seasonal Ic [Cholestatin] Unknown - Low Severity         Current Outpatient Medications:     atorvastatin (LIPITOR) 40 MG tablet, Take 1 tablet by mouth Every Night., Disp: , Rfl:     digoxin (Lanoxin) 250 MCG tablet, Take 1 tablet by mouth Daily., Disp: 90 tablet, Rfl: 3    furosemide (LASIX) 40 MG tablet, TAKE 1 TABLET BY MOUTH DAILY, Disp: 5 tablet, Rfl: 0    metFORMIN (GLUCOPHAGE) 500 MG tablet, Take 1 tablet by mouth 2 (Two) Times a Day., Disp: , Rfl:     metoprolol succinate XL (TOPROL-XL) 100 MG 24 hr tablet, Take 1 tablet by mouth 2 (Two) Times a Day., Disp: 180 tablet, Rfl: 3    nitroglycerin (NITROSTAT) 0.4 MG SL tablet, Place 1 tablet under the tongue Every 5 (Five) Minutes As Needed., Disp: , Rfl:     potassium chloride ER (K-TAB) 20 MEQ tablet controlled-release ER tablet, TAKE ONE TABLET BY MOUTH TWICE A DAY, Disp: 60 tablet, Rfl: 3    sacubitril-valsartan (Entresto) 49-51 MG tablet, Take 1 tablet by mouth 2 (Two) Times a Day., Disp: 180 tablet, Rfl: 3    warfarin (COUMADIN) 6 MG tablet, TAKE 1 AND 1/2 TABLETS (9MG) BY MOUTH ON TUESDAYS, AND TAKE 1 TABLET ALL OTHER DAYS OR AS DIRECTED, Disp: 100 tablet, Rfl: 1    famotidine (PEPCID) 40 MG tablet, Take 0.5 tablets by mouth Daily. (Patient not taking: Reported on 8/1/2024), Disp: , Rfl:     Semaglutide,0.25 or 0.5MG/DOS, (OZEMPIC) 2 MG/1.5ML solution pen-injector, Inject 0.25 mg under the skin into the appropriate area as directed. (Patient not taking: Reported on 8/1/2024), Disp: , Rfl:       Objective:     Vitals:    08/01/24 0845   BP: 124/82   BP Location: Right arm   Patient Position: Sitting   Cuff Size: Adult   Pulse: 82   Weight: (!) 182 kg (401 lb)  "  Height: 175.3 cm (69\")     Body mass index is 59.22 kg/m².    PHYSICAL EXAM:    Vitals and nursing note reviewed.   Constitutional:       General: Not in acute distress.     Appearance: Not in distress.   Pulmonary:      Effort: Pulmonary effort is normal. No respiratory distress.   Cardiovascular:      Normal rate. Irregular rhythm.   Edema:     Peripheral edema absent.   Skin:     General: Skin is warm and dry.   Neurological:      Mental Status: Alert and oriented to person, place, and time.   Psychiatric:         Behavior: Behavior normal.         Thought Content: Thought content normal.         Judgment: Judgment normal.             ECG 12 Lead    Date/Time: 8/1/2024 3:00 PM  Performed by: Moris Livingston MD    Authorized by: Moris Livingston MD  Comparison: compared with previous ECG from 4/29/2024  Rhythm: atrial fibrillation  Conduction: non-specific intraventricular conduction delay            Assessment:       Diagnosis Plan   1. NICM (nonischemic cardiomyopathy)        2. Atrial fibrillation, persistent               Plan:       I didn't think there was anything I could do for his atrial fibrillation.  I don't think he needs a CRT, narrow, IVCD    When echo was repeated, if ejection fraction remains depressed, we can consider for ICD primary prevention non-CRT placement.  As always, it has been a pleasure to participate in your patient's care.      Sincerely,         Moris Livingston MD  "

## 2024-08-13 ENCOUNTER — ANTICOAGULATION VISIT (OUTPATIENT)
Dept: PHARMACY | Facility: HOSPITAL | Age: 59
End: 2024-08-13
Payer: COMMERCIAL

## 2024-08-13 DIAGNOSIS — I48.91 ATRIAL FIBRILLATION WITH RAPID VENTRICULAR RESPONSE: Primary | ICD-10-CM

## 2024-08-13 DIAGNOSIS — Z79.01 ANTICOAGULATED ON COUMADIN: ICD-10-CM

## 2024-08-13 LAB
INR PPP: 2 (ref 0.91–1.09)
PROTHROMBIN TIME: 23.9 SECONDS (ref 10–13.8)

## 2024-08-13 PROCEDURE — 36416 COLLJ CAPILLARY BLOOD SPEC: CPT

## 2024-08-13 PROCEDURE — G0463 HOSPITAL OUTPT CLINIC VISIT: HCPCS

## 2024-08-13 PROCEDURE — 85610 PROTHROMBIN TIME: CPT

## 2024-08-13 NOTE — PROGRESS NOTES
Anticoagulation Clinic Progress Note    Anticoagulation Summary  As of 2024      INR goal:  2.0-3.0   TTR:  62.2% (4.5 y)   INR used for dosin.0 (2024)   Warfarin maintenance plan:  9 mg every Tue; 6 mg all other days   Weekly warfarin total:  45 mg   Plan last modified:  Rani Dhillon [facility] (2021)   Next INR check:  2024   Priority:  High   Target end date:  Indefinite    Indications    Atrial fibrillation with rapid ventricular response [I48.91]  Anticoagulated on Coumadin [Z79.01]                 Anticoagulation Episode Summary       INR check location:      Preferred lab:      Send INR reminders to:   JONAH DIAMOND [facility]    Comments:  Afib + left atrial appendage thrombus at present; pending cardioversion upon resolution of CARRI thrombus.          Anticoagulation Care Providers       Provider Role Specialty Phone number    Gavin Luke MD Referring Cardiology [phone]            Clinic Interview:  Patient Findings     Positives:  Upcoming invasive procedure    Negatives:  Signs/symptoms of thrombosis, Signs/symptoms of bleeding,   Laboratory test error suspected, Change in health, Change in alcohol use,   Change in activity, Emergency department visit, Upcoming dental procedure,   Missed doses, Extra doses, Change in medications, Change in diet/appetite,   Hospital admission, Bruising, Other complaints    Comments:  Scheduled for shoulder surgery on ; holding for 5 days   prior.       Clinical Outcomes     Negatives:  Major bleeding event, Thromboembolic event,   Anticoagulation-related hospital admission, Anticoagulation-related ED   visit, Anticoagulation-related fatality    Comments:  Scheduled for shoulder surgery on ; holding for 5 days   prior.         INR History:      2024     9:00 AM 2024     9:27 AM 2024     9:30 AM 2024     9:30 AM 2024     9:00 AM 2024     8:45 AM 2024     9:45 AM   Anticoagulation Monitoring    INR 2.0 1.8 2.1 2.2 1.9 2.6 2.0   INR Date 4/4/2024 5/7/2024 5/13/2024 5/28/2024 7/12/2024 7/25/2024 8/13/2024   INR Goal 2.0-3.0 2.0-3.0 2.0-3.0 2.0-3.0 2.0-3.0 2.0-3.0 2.0-3.0   Trend Same Same Same Same Same Same Same   Last Week Total 45 mg 21 mg 42 mg 45 mg 45 mg 45 mg 45 mg   Next Week Total 45 mg 48 mg 45 mg 45 mg 45 mg 45 mg 15 mg   Sun 6 mg 6 mg 6 mg 6 mg 6 mg 6 mg Hold (8/18); Otherwise 6 mg   Mon 6 mg - 6 mg 6 mg 6 mg 6 mg Hold (8/19)   Tue 9 mg 9 mg 9 mg 9 mg 9 mg 9 mg 9 mg   Wed 6 mg 9 mg (5/8) 6 mg 6 mg 6 mg 6 mg 9 mg (8/21); Otherwise 6 mg   Thu 6 mg 6 mg 6 mg 6 mg 6 mg 6 mg Hold (8/15), 9 mg (8/22)   Fri 6 mg 6 mg 6 mg 6 mg 6 mg 6 mg Hold (8/16); Otherwise 6 mg   Sat 6 mg 6 mg 6 mg 6 mg 6 mg 6 mg Hold (8/17); Otherwise 6 mg       Plan:  1. INR is Therapeutic today- see above in Anticoagulation Summary.  Will instruct Milton Moody to Continue their warfarin regimen- see above in Anticoagulation Summary.  Upcoming shoulder surgery on 8/20: Begin holding on Thursday (total 5 days holding prior to procedure). Plan to resume warfarin 9 mg on evening of surgery, 8/20 (if cleared by surgeon), then take 9 mg on 8/21 and 8/22. Resume usual dose of warfarin (9 mg Tues, 6 mg AOD) on Friday 8/23. Recheck INR on Monday 8/26.   2. Follow up in 2 weeks  3. Patient declines warfarin refills.  4. Verbal and written information provided. Patient expresses understanding and has no further questions at this time.    Mercedes Hsu, PharmD

## 2024-09-23 ENCOUNTER — ANTICOAGULATION VISIT (OUTPATIENT)
Dept: PHARMACY | Facility: HOSPITAL | Age: 59
End: 2024-09-23
Payer: COMMERCIAL

## 2024-09-23 DIAGNOSIS — I48.91 ATRIAL FIBRILLATION WITH RAPID VENTRICULAR RESPONSE: Primary | ICD-10-CM

## 2024-09-23 DIAGNOSIS — Z79.01 ANTICOAGULATED ON COUMADIN: ICD-10-CM

## 2024-09-23 LAB
INR PPP: 2.9 (ref 0.91–1.09)
PROTHROMBIN TIME: 34.6 SECONDS (ref 10–13.8)

## 2024-09-23 PROCEDURE — 36416 COLLJ CAPILLARY BLOOD SPEC: CPT

## 2024-09-23 PROCEDURE — G0463 HOSPITAL OUTPT CLINIC VISIT: HCPCS

## 2024-09-23 PROCEDURE — 85610 PROTHROMBIN TIME: CPT

## 2024-09-23 RX ORDER — OXYCODONE AND ACETAMINOPHEN 7.5; 325 MG/1; MG/1
1 TABLET ORAL EVERY 6 HOURS PRN
COMMUNITY
Start: 2024-09-04

## 2024-10-21 RX ORDER — WARFARIN SODIUM 6 MG/1
TABLET ORAL
Qty: 100 TABLET | Refills: 1 | Status: SHIPPED | OUTPATIENT
Start: 2024-10-21

## 2024-11-05 ENCOUNTER — ANTICOAGULATION VISIT (OUTPATIENT)
Dept: PHARMACY | Facility: HOSPITAL | Age: 59
End: 2024-11-05
Payer: COMMERCIAL

## 2024-11-05 DIAGNOSIS — Z79.01 ANTICOAGULATED ON COUMADIN: ICD-10-CM

## 2024-11-05 DIAGNOSIS — I48.91 ATRIAL FIBRILLATION WITH RAPID VENTRICULAR RESPONSE: Primary | ICD-10-CM

## 2024-11-05 LAB
INR PPP: 2.3 (ref 0.91–1.09)
PROTHROMBIN TIME: 27.1 SECONDS (ref 10–13.8)

## 2024-11-05 PROCEDURE — G0463 HOSPITAL OUTPT CLINIC VISIT: HCPCS

## 2024-11-05 PROCEDURE — 85610 PROTHROMBIN TIME: CPT

## 2024-11-05 PROCEDURE — 36416 COLLJ CAPILLARY BLOOD SPEC: CPT

## 2024-11-05 NOTE — PROGRESS NOTES
Anticoagulation Clinic Progress Note    Anticoagulation Summary  As of 2024      INR goal:  2.0-3.0   TTR:  64.0% (4.7 y)   INR used for dosin.3 (2024)   Warfarin maintenance plan:  9 mg every Tue; 6 mg all other days   Weekly warfarin total:  45 mg   No change documented:  Kaleigh Romero [facility]   Plan last modified:  Rani Dhillon [facility] (2021)   Next INR check:  12/3/2024   Priority:  High   Target end date:  Indefinite    Indications    Atrial fibrillation with rapid ventricular response [I48.91]  Anticoagulated on Coumadin [Z79.01]                 Anticoagulation Episode Summary       INR check location:  --    Preferred lab:  --    Send INR reminders to:  EPIFANIO DIAMOND [facility]    Comments:  Afib + left atrial appendage thrombus at present; pending cardioversion upon resolution of CARRI thrombus.          Anticoagulation Care Providers       Provider Role Specialty Phone number    Gavin Luke MD Referring Cardiology [phone]            Clinic Interview:  Patient Findings     Positives:  Change in health, Change in diet/appetite    Negatives:  Signs/symptoms of thrombosis, Signs/symptoms of bleeding,   Laboratory test error suspected, Change in alcohol use, Change in   activity, Upcoming invasive procedure, Emergency department visit,   Upcoming dental procedure, Missed doses, Extra doses, Change in   medications, Hospital admission, Bruising, Other complaints    Comments:  Patient reports continued oxycodone PRN use, patient reports   cold symptoms and will follow up with PCP if no symptom improvements this   week, patient also reports drinking 1 glass of cranberry juice last night   ()      Clinical Outcomes     Negatives:  Major bleeding event, Thromboembolic event,   Anticoagulation-related hospital admission, Anticoagulation-related ED   visit, Anticoagulation-related fatality    Comments:  Patient reports continued oxycodone PRN use, patient reports   cold symptoms  and will follow up with PCP if no symptom improvements this   week, patient also reports drinking 1 glass of cranberry juice last night   (11/4)        INR History:      5/13/2024     9:30 AM 5/28/2024     9:30 AM 7/12/2024     9:00 AM 7/25/2024     8:45 AM 8/13/2024     9:45 AM 9/23/2024    10:00 AM 11/5/2024    10:15 AM   Anticoagulation Monitoring   INR 2.1 2.2 1.9 2.6 2.0 2.9 2.3   INR Date 5/13/2024 5/28/2024 7/12/2024 7/25/2024 8/13/2024 9/23/2024 11/5/2024   INR Goal 2.0-3.0 2.0-3.0 2.0-3.0 2.0-3.0 2.0-3.0 2.0-3.0 2.0-3.0   Trend Same Same Same Same Same Same Same   Last Week Total 42 mg 45 mg 45 mg 45 mg 45 mg 45 mg 45 mg   Next Week Total 45 mg 45 mg 45 mg 45 mg 15 mg 45 mg 45 mg   Sun 6 mg 6 mg 6 mg 6 mg Hold (8/18); Otherwise 6 mg 6 mg 6 mg   Mon 6 mg 6 mg 6 mg 6 mg Hold (8/19) 6 mg 6 mg   Tue 9 mg 9 mg 9 mg 9 mg 9 mg 9 mg 9 mg   Wed 6 mg 6 mg 6 mg 6 mg 9 mg (8/21); Otherwise 6 mg 6 mg 6 mg   Thu 6 mg 6 mg 6 mg 6 mg Hold (8/15), 9 mg (8/22) 6 mg 6 mg   Fri 6 mg 6 mg 6 mg 6 mg Hold (8/16); Otherwise 6 mg 6 mg 6 mg   Sat 6 mg 6 mg 6 mg 6 mg Hold (8/17); Otherwise 6 mg 6 mg 6 mg       Plan:  1. INR is Therapeutic today- see above in Anticoagulation Summary.  Will instruct Milton Moody to Continue their warfarin regimen- see above in Anticoagulation Summary.  2. Follow up in 4 weeks  3. Patient declines warfarin refills.  4. Verbal and written information provided. Patient expresses understanding and has no further questions at this time.    Kaleigh Romero [facility]

## 2024-11-06 NOTE — PROGRESS NOTES
Call to patient w/instructions per Sadiq as below. He verbalized understanding and had no additional questions. He confirmed appt tomorrow w/Sadiq. I will send note to board for a f/u call on thurs.  Jaqueline Miller RN on 11/12/2018 at 12:12 PM     I have supervised and reviewed the notes, assessments, and/or procedures performed. The documented assessment and plan were developed cooperatively. I concur with the documentation of this patient encounter.    Jimi Reid, PharmD

## 2025-01-22 ENCOUNTER — OFFICE VISIT (OUTPATIENT)
Dept: CARDIOLOGY | Facility: CLINIC | Age: 60
End: 2025-01-22
Payer: COMMERCIAL

## 2025-01-22 VITALS
HEART RATE: 57 BPM | WEIGHT: 315 LBS | DIASTOLIC BLOOD PRESSURE: 84 MMHG | OXYGEN SATURATION: 98 % | HEIGHT: 69 IN | BODY MASS INDEX: 46.65 KG/M2 | SYSTOLIC BLOOD PRESSURE: 124 MMHG

## 2025-01-22 DIAGNOSIS — N18.31 STAGE 3A CHRONIC KIDNEY DISEASE: ICD-10-CM

## 2025-01-22 DIAGNOSIS — E78.2 MIXED HYPERLIPIDEMIA: ICD-10-CM

## 2025-01-22 DIAGNOSIS — I10 ESSENTIAL HYPERTENSION: ICD-10-CM

## 2025-01-22 DIAGNOSIS — I48.19 ATRIAL FIBRILLATION, PERSISTENT: ICD-10-CM

## 2025-01-22 DIAGNOSIS — Z79.899 LONG-TERM USE OF HIGH-RISK MEDICATION: ICD-10-CM

## 2025-01-22 DIAGNOSIS — E66.01 MORBIDLY OBESE: Primary | ICD-10-CM

## 2025-01-22 DIAGNOSIS — I44.7 LBBB (LEFT BUNDLE BRANCH BLOCK): ICD-10-CM

## 2025-01-22 DIAGNOSIS — I42.8 NICM (NONISCHEMIC CARDIOMYOPATHY): ICD-10-CM

## 2025-01-22 DIAGNOSIS — R73.03 PREDIABETES: ICD-10-CM

## 2025-01-22 RX ORDER — SEMAGLUTIDE 0.25 MG/.5ML
0.25 INJECTION, SOLUTION SUBCUTANEOUS WEEKLY
Qty: 2 ML | Refills: 1 | Status: SHIPPED | OUTPATIENT
Start: 2025-01-22

## 2025-01-22 NOTE — PROGRESS NOTES
PATIENTINFORMATION    Date of Office Visit: 2025  Encounter Provider: Gavin Luke MD  Place of Service: [facility] CARDIOLOGY  Patient Name: Milton Moody  : 1965    Subjective:     Encounter Date:2025      Patient ID: Milton Moody is a 59 y.o. male.    Chief Complaint   Patient presents with    Atrial Fibrillation       HPI  Mr. Moody is a pleasant 59 years old gentleman who came to cardiac clinic for follow-up.  Overall increased or worsening shortness of breath and he continues to gain weight and becoming more sedentary.  He denies any chest discomfort.  He reports being compliant with BiPAP machine and current medications.  Denies bleeding on Coumadin  No recent ER visit hospitalization.      ROS  All systems reviewed and negative except as noted in HPI.    Past Medical History:   Diagnosis Date    Abnormal ECG     Asthma     Atrial fibrillation     Chest pain     CKD (chronic kidney disease) stage 3, GFR 30-59 ml/min 3/4/2020    Hyperlipidemia     Hypertension     LBBB (left bundle branch block) 10/31/2023    NICM (nonischemic cardiomyopathy) 3/4/2020    Nonischemic cardiomyopathy     Palpitations     Rapid heart rate     Sleep apnea        Past Surgical History:   Procedure Laterality Date    KNEE SURGERY Left 2019    SHOULDER ROTATOR CUFF REPAIR Right 2020       Social History     Socioeconomic History    Marital status:    Tobacco Use    Smoking status: Former     Current packs/day: 0.10     Average packs/day: 0.1 packs/day for 3.0 years (0.3 ttl pk-yrs)     Types: Cigarettes     Passive exposure: Past    Smokeless tobacco: Former     Types: Snuff   Vaping Use    Vaping status: Never Used   Substance and Sexual Activity    Alcohol use: Yes     Alcohol/week: 6.0 standard drinks of alcohol     Types: 6 Cans of beer per week     Comment: weekend alcohol use    Drug use: Not Currently     Types: Marijuana    Sexual activity: Not Currently     Partners: Female  "      Family History   Problem Relation Age of Onset    Hypertension Mother     Hypertension Sister          Procedures       Objective:     /84 (BP Location: Left arm, Patient Position: Sitting, Cuff Size: Large Adult)   Pulse 57   Ht 175.3 cm (69\")   Wt (!) 182 kg (401 lb)   SpO2 98%   BMI 59.22 kg/m²  Body mass index is 59.22 kg/m².     Constitutional:       General: Not in acute distress.     Appearance: Morbidly obese. Not diaphoretic.   Eyes:      Pupils: Pupils are equal, round, and reactive to light.   HENT:      Head: Normocephalic and atraumatic.   Neck:      Thyroid: No thyromegaly.   Pulmonary:      Effort: Pulmonary effort is normal. No respiratory distress.      Breath sounds: Normal breath sounds. No wheezing. No rales.   Chest:      Chest wall: Not tender to palpatation.   Cardiovascular:      Normal rate. Irregularly irregular rhythm.      No gallop.    Pulses:     Intact distal pulses.   Edema:     Peripheral edema absent.   Abdominal:      General: Bowel sounds are normal. There is no distension.      Palpations: Abdomen is soft.      Tenderness: There is no guarding.   Musculoskeletal: Normal range of motion.         General: No deformity.      Cervical back: Normal range of motion and neck supple. Skin:     General: Skin is warm and dry.      Findings: No rash.   Neurological:      Mental Status: Alert and oriented to person, place, and time.      Cranial Nerves: No cranial nerve deficit.      Deep Tendon Reflexes: Reflexes are normal and symmetric.   Psychiatric:         Judgment: Judgment normal.         Review Of Data: I have reviewed pertinent recent labs, images and documents and pertinent findings included in HPI or assessment below.          Assessment/Plan:     Persistent atrial fibrillation with underlying Left bundle branch block unchanged/nonspecific intraventricular conduction delay on some of the EKGs:  on metoprolol and Coumadin  Non ischemic cardiomyopathy-Myocardial " perfusion study on 5/7/2021 with no evidence for ischemia and left ventricular ejection fraction of 21%. Echo in May 2021 with estimated left ventricular ejection fraction of 41-45%. Repeat echo in 02/2023 with LVEF of  41-45%.  Repeat echo in September 2023 with LVEF of 31 to 35%.  Metoprolol tartrate switched to succinate.  And already on Entresto.  Essential hypertension  Morbid obesity with complications/obstructive sleep apnea on CPAP-  Most of his medical problems are complications of obesity.    Hypercholesterolemia-on a statin-panel at goal  Chronic kidney disease-stable creatinine   Pulmonary nodule-  follows up with pulmonary  Degenerative joint disease and prior surgery-status post a right shoulder surgery in August 2024    Continues to gain weight with worsening morbidity.  Gets short winded with minimal walking.  Not overtly volume overloaded.  Vital signs within range.  I have placed referral to bariatric surgery again.  Without treating his weight long-term prognosis is guarded as he already has multiple comorbidities from weight.  Check digoxin level, creatinine  Follow-up in cardiac clinic in 6 months or sooner with concerns.  Diagnosis and plan of care discussed with patient and verbalized understanding.            Your medication list            Accurate as of January 22, 2025  4:36 PM. If you have any questions, ask your nurse or doctor.                START taking these medications        Instructions Last Dose Given Next Dose Due   Wegovy 0.25 MG/0.5ML solution auto-injector  Generic drug: Semaglutide-Weight Management  Started by: Mana Mcleod      Inject 0.5 mL under the skin into the appropriate area as directed 1 (One) Time Per Week.              CONTINUE taking these medications        Instructions Last Dose Given Next Dose Due   atorvastatin 40 MG tablet  Commonly known as: LIPITOR      Take 1 tablet by mouth Every Night.       digoxin 250 MCG tablet  Commonly known as: Lanoxin       Take 1 tablet by mouth Daily.       Entresto 49-51 MG tablet  Generic drug: sacubitril-valsartan      Take 1 tablet by mouth 2 (Two) Times a Day.       furosemide 40 MG tablet  Commonly known as: LASIX      TAKE 1 TABLET BY MOUTH DAILY       metFORMIN 500 MG tablet  Commonly known as: GLUCOPHAGE      Take 1 tablet by mouth 2 (Two) Times a Day.       metoprolol succinate  MG 24 hr tablet  Commonly known as: TOPROL-XL      Take 1 tablet by mouth 2 (Two) Times a Day.       nitroglycerin 0.4 MG SL tablet  Commonly known as: NITROSTAT      Place 1 tablet under the tongue Every 5 (Five) Minutes As Needed.       oxyCODONE-acetaminophen 7.5-325 MG per tablet  Commonly known as: PERCOCET      Take 1 tablet by mouth Every 6 (Six) Hours As Needed for Moderate Pain.       potassium chloride ER 20 MEQ tablet controlled-release ER tablet  Commonly known as: K-TAB      TAKE ONE TABLET BY MOUTH TWICE A DAY       warfarin 6 MG tablet  Commonly known as: COUMADIN      TAKE 1 AND 1/2 TABLETS (9MG) BY MOUTH ON TUESDAYS, & 1 TABLET (6MG) ALL OTHER DAYS OR AS DIRECTED                 Where to Get Your Medications        These medications were sent to [facility]/pharmacy #[id number] - [facility] [address] [place] [address] RD. AT [facility] - [phone] [facility] [phone] [address], [facility] [address]      Phone: [phone]   Wegovy 0.25 MG/0.5ML solution auto-injector             Gavin Luke MD  01/22/25  16:36 EST

## 2025-01-23 ENCOUNTER — TELEPHONE (OUTPATIENT)
Dept: CARDIOLOGY | Facility: CLINIC | Age: 60
End: 2025-01-23
Payer: COMMERCIAL

## 2025-01-23 LAB
ALBUMIN SERPL-MCNC: 4.3 G/DL (ref 3.8–4.9)
ALP SERPL-CCNC: 139 IU/L (ref 44–121)
ALT SERPL-CCNC: 30 IU/L (ref 0–44)
AST SERPL-CCNC: 37 IU/L (ref 0–40)
BILIRUB SERPL-MCNC: 0.5 MG/DL (ref 0–1.2)
BUN SERPL-MCNC: 14 MG/DL (ref 6–24)
BUN/CREAT SERPL: 10 (ref 9–20)
CALCIUM SERPL-MCNC: 9.6 MG/DL (ref 8.7–10.2)
CHLORIDE SERPL-SCNC: 100 MMOL/L (ref 96–106)
CO2 SERPL-SCNC: 19 MMOL/L (ref 20–29)
CREAT SERPL-MCNC: 1.47 MG/DL (ref 0.76–1.27)
DIGOXIN SERPL-MCNC: 0.7 NG/ML (ref 0.5–0.9)
EGFRCR SERPLBLD CKD-EPI 2021: 55 ML/MIN/1.73
GLOBULIN SER CALC-MCNC: 3.5 G/DL (ref 1.5–4.5)
GLUCOSE SERPL-MCNC: ABNORMAL MG/DL
NT-PROBNP SERPL-MCNC: 683 PG/ML (ref 0–210)
POTASSIUM SERPL-SCNC: ABNORMAL MMOL/L
PROT SERPL-MCNC: 7.8 G/DL (ref 6–8.5)
SODIUM SERPL-SCNC: 141 MMOL/L (ref 134–144)

## 2025-01-23 RX ORDER — METOPROLOL SUCCINATE 100 MG/1
100 TABLET, EXTENDED RELEASE ORAL 2 TIMES DAILY
Qty: 180 TABLET | Refills: 1 | Status: SHIPPED | OUTPATIENT
Start: 2025-01-23

## 2025-01-23 NOTE — TELEPHONE ENCOUNTER
----- Message from Gavin Luke sent at 1/22/2025  4:32 PM EST -----  Can you reach out to Dr. Santana office to get in touch with someone to schedule [facility] for evaluation for bariatric surgery?  Cardiology clinic's wife and reconciliate current medications.  Thank you

## 2025-02-06 ENCOUNTER — TELEPHONE (OUTPATIENT)
Dept: CARDIOLOGY | Facility: CLINIC | Age: 60
End: 2025-02-06
Payer: COMMERCIAL

## 2025-02-06 NOTE — TELEPHONE ENCOUNTER
Pt is c/o swelling of ankle. He states that he forgot to take his lasix for 2 days. I advised pt that its important to take his lasix to prevent swelling. He will resume his lasix and if he still have swelling on his ankle Monday. He will give the office a call.       Is this ok or any other recommendation?      Thanks  Dilcia CHAVEZ

## 2025-03-17 ENCOUNTER — TELEPHONE (OUTPATIENT)
Dept: PHARMACY | Facility: HOSPITAL | Age: 60
End: 2025-03-17
Payer: COMMERCIAL

## 2025-03-20 ENCOUNTER — ANTICOAGULATION VISIT (OUTPATIENT)
Dept: PHARMACY | Facility: HOSPITAL | Age: 60
End: 2025-03-20
Payer: COMMERCIAL

## 2025-03-20 DIAGNOSIS — I48.91 ATRIAL FIBRILLATION WITH RAPID VENTRICULAR RESPONSE: Primary | ICD-10-CM

## 2025-03-20 DIAGNOSIS — Z79.01 ANTICOAGULATED ON COUMADIN: ICD-10-CM

## 2025-03-20 LAB
INR PPP: 1.3 (ref 0.91–1.09)
PROTHROMBIN TIME: 15.3 SECONDS (ref 10–13.8)

## 2025-03-20 PROCEDURE — 85610 PROTHROMBIN TIME: CPT

## 2025-03-20 PROCEDURE — G0463 HOSPITAL OUTPT CLINIC VISIT: HCPCS

## 2025-03-20 RX ORDER — WARFARIN SODIUM 6 MG/1
TABLET ORAL
Qty: 35 TABLET | Refills: 0 | Status: SHIPPED | OUTPATIENT
Start: 2025-03-20

## 2025-03-20 NOTE — PROGRESS NOTES
Anticoagulation Clinic Progress Note    Anticoagulation Summary  As of 3/20/2025      INR goal:  2.0-3.0   TTR:  61.5% (5.1 y)   INR used for dosin.3 (3/20/2025)   Warfarin maintenance plan:  9 mg every Tue; 6 mg all other days   Weekly warfarin total:  45 mg   Plan last modified:  Rani Dhillon [facility] (2021)   Next INR check:  4/3/2025   Priority:  High   Target end date:  Indefinite    Indications    Atrial fibrillation with rapid ventricular response [I48.91]  Anticoagulated on Coumadin [Z79.01]                 Anticoagulation Episode Summary       INR check location:  --    Preferred lab:  --    Send INR reminders to:  [facility]    Comments:  Afib + left atrial appendage thrombus at present; pending cardioversion upon resolution of CARRI thrombus.          Anticoagulation Care Providers       Provider Role Specialty Phone number    Gavin Luke MD Referring Cardiology [phone]            Clinic Interview:  Patient Findings     Positives:  Missed doses    Negatives:  Signs/symptoms of thrombosis, Signs/symptoms of bleeding,   Laboratory test error suspected, Change in health, Change in alcohol use,   Change in activity, Upcoming invasive procedure, Emergency department   visit, Upcoming dental procedure, Extra doses, Change in medications,   Change in diet/appetite, Hospital admission, Bruising, Other complaints      Clinical Outcomes     Negatives:  Major bleeding event, Thromboembolic event,   Anticoagulation-related hospital admission, Anticoagulation-related ED   visit, Anticoagulation-related fatality        INR History:      2024     9:30 AM 2024     9:00 AM 2024     8:45 AM 2024     9:45 AM 2024    10:00 AM 2024    10:15 AM 3/20/2025    10:15 AM   Anticoagulation Monitoring   INR 2.2 1.9 2.6 2.0 2.9 2.3 1.3   INR Date 2024 2024 2024 2024 2024 2024 3/20/2025   INR Goal 2.0-3.0 2.0-3.0 2.0-3.0 2.0-3.0  2.0-3.0 2.0-3.0 2.0-3.0   Trend Same Same Same Same Same Same Same   Last Week Total 45 mg 45 mg 45 mg 45 mg 45 mg 45 mg 33 mg   Next Week Total 45 mg 45 mg 45 mg 15 mg 45 mg 45 mg 51 mg   Sun 6 mg 6 mg 6 mg Hold (8/18); Otherwise 6 mg 6 mg 6 mg 6 mg   Mon 6 mg 6 mg 6 mg Hold (8/19) 6 mg 6 mg 6 mg   Tue 9 mg 9 mg 9 mg 9 mg 9 mg 9 mg 9 mg   Wed 6 mg 6 mg 6 mg 9 mg (8/21); Otherwise 6 mg 6 mg 6 mg 6 mg   Thu 6 mg 6 mg 6 mg Hold (8/15), 9 mg (8/22) 6 mg 6 mg 9 mg (3/20); Otherwise 6 mg   Fri 6 mg 6 mg 6 mg Hold (8/16); Otherwise 6 mg 6 mg 6 mg 9 mg (3/21); Otherwise 6 mg   Sat 6 mg 6 mg 6 mg Hold (8/17); Otherwise 6 mg 6 mg 6 mg 6 mg       Plan:  1. INR is Subtherapeutic today- see above in Anticoagulation Summary.   Will instruct Milton Moody to Increase their warfarin regimen- see above in Anticoagulation Summary.      Have not seen the patient since November. Missed dose on Monday and Sunday. He did confirm his dose of warfarin. Reports a dental appt tomorrow.     Boost to 9 mg x 2 days and then resume 9 mg on tues, 6 mg AOD, rck 2 weeks (Recommended to return on 3/26 to coordinate with [facility] appt but pt declined)     2. Follow up in 2 weeks  3. They have been instructed to call if any changes in medications, doses, concerns, etc. Patient expresses understanding and has no further questions at this time.    Jessica Talbot [facility]

## 2025-04-03 ENCOUNTER — ANTICOAGULATION VISIT (OUTPATIENT)
Dept: PHARMACY | Facility: HOSPITAL | Age: 60
End: 2025-04-03
Payer: COMMERCIAL

## 2025-04-03 DIAGNOSIS — I48.91 ATRIAL FIBRILLATION WITH RAPID VENTRICULAR RESPONSE: Primary | ICD-10-CM

## 2025-04-03 DIAGNOSIS — Z79.01 ANTICOAGULATED ON COUMADIN: ICD-10-CM

## 2025-04-03 LAB
INR PPP: 1.6 (ref 0.91–1.09)
PROTHROMBIN TIME: 19 SECONDS (ref 10–13.8)

## 2025-04-03 PROCEDURE — 85610 PROTHROMBIN TIME: CPT

## 2025-04-03 PROCEDURE — G0463 HOSPITAL OUTPT CLINIC VISIT: HCPCS

## 2025-04-03 RX ORDER — CHLORAL HYDRATE 500 MG
1000 CAPSULE ORAL DAILY
COMMUNITY

## 2025-04-03 NOTE — PROGRESS NOTES
Anticoagulation Clinic Progress Note    Anticoagulation Summary  As of 4/3/2025      INR goal:  2.0-3.0   TTR:  61.1% (5.1 y)   INR used for dosin.6 (4/3/2025)   Warfarin maintenance plan:  9 mg every Tue; 6 mg all other days   Weekly warfarin total:  45 mg   Plan last modified:  Rani Dhillon [facility] (2021)   Next INR check:  2025   Priority:  High   Target end date:  Indefinite    Indications    Atrial fibrillation with rapid ventricular response [I48.91]  Anticoagulated on Coumadin [Z79.01]                 Anticoagulation Episode Summary       INR check location:  --    Preferred lab:  --    Send INR reminders to:  [facility]    Comments:  Afib + left atrial appendage thrombus at present; pending cardioversion upon resolution of CARRI thrombus.          Anticoagulation Care Providers       Provider Role Specialty Phone number    Gavin Luke MD Referring Cardiology [phone]            Clinic Interview:  Patient Findings     Positives:  Missed doses, Extra doses    Negatives:  Signs/symptoms of thrombosis, Signs/symptoms of bleeding,   Laboratory test error suspected, Change in health, Change in alcohol use,   Change in activity, Upcoming invasive procedure, Emergency department   visit, Upcoming dental procedure, Change in medications, Change in   diet/appetite, Hospital admission, Bruising, Other complaints    Comments:  Reports he missed his warfarin dose on 3/30/25, so he took   extra 3 mg on 3/31/25.      Clinical Outcomes     Negatives:  Major bleeding event, Thromboembolic event,   Anticoagulation-related hospital admission, Anticoagulation-related ED   visit, Anticoagulation-related fatality    Comments:  Reports he missed his warfarin dose on 3/30/25, so he took   extra 3 mg on 3/31/25.        INR History:      2024     9:00 AM 2024     8:45 AM 2024     9:45 AM 2024    10:00 AM 2024    10:15 AM 3/20/2025    10:15 AM 4/3/2025    11:00 AM    Anticoagulation Monitoring   INR 1.9 2.6 2.0 2.9 2.3 1.3 1.6   INR Date 7/12/2024 7/25/2024 8/13/2024 9/23/2024 11/5/2024 3/20/2025 4/3/2025   INR Goal 2.0-3.0 2.0-3.0 2.0-3.0 2.0-3.0 2.0-3.0 2.0-3.0 2.0-3.0   Trend Same Same Same Same Same Same Same   Last Week Total 45 mg 45 mg 45 mg 45 mg 45 mg 33 mg 42 mg   Next Week Total 45 mg 45 mg 15 mg 45 mg 45 mg 51 mg 51 mg   Sun 6 mg 6 mg Hold (8/18); Otherwise 6 mg 6 mg 6 mg 6 mg 6 mg   Mon 6 mg 6 mg Hold (8/19) 6 mg 6 mg 6 mg 6 mg   Tue 9 mg 9 mg 9 mg 9 mg 9 mg 9 mg 9 mg   Wed 6 mg 6 mg 9 mg (8/21); Otherwise 6 mg 6 mg 6 mg 6 mg 6 mg   Thu 6 mg 6 mg Hold (8/15), 9 mg (8/22) 6 mg 6 mg 9 mg (3/20); Otherwise 6 mg 9 mg (4/3); Otherwise 6 mg   Fri 6 mg 6 mg Hold (8/16); Otherwise 6 mg 6 mg 6 mg 9 mg (3/21); Otherwise 6 mg 9 mg (4/4); Otherwise 6 mg   Sat 6 mg 6 mg Hold (8/17); Otherwise 6 mg 6 mg 6 mg 6 mg 6 mg       Plan:  1. INR is Subtherapeutic today- see above in Anticoagulation Summary.  Will instruct Milton Moody to Change their warfarin regimen (9 mg today/tomorrow, then resume 9 mg Tues, 6 mg all other days) - see above in Anticoagulation Summary.  2. Follow up in 2 weeks.  3. Patient declines warfarin refills.  4. Verbal and written information provided. Patient expresses understanding and has no further questions at this time.    Jimi Reid, PharmD

## 2025-04-14 RX ORDER — WARFARIN SODIUM 6 MG/1
TABLET ORAL
Qty: 35 TABLET | Refills: 2 | Status: SHIPPED | OUTPATIENT
Start: 2025-04-14

## 2025-07-25 ENCOUNTER — TELEPHONE (OUTPATIENT)
Dept: PHARMACY | Facility: HOSPITAL | Age: 60
End: 2025-07-25
Payer: COMMERCIAL

## 2025-07-29 ENCOUNTER — ANTICOAGULATION VISIT (OUTPATIENT)
Dept: PHARMACY | Facility: HOSPITAL | Age: 60
End: 2025-07-29
Payer: COMMERCIAL

## 2025-07-29 DIAGNOSIS — I48.91 ATRIAL FIBRILLATION WITH RAPID VENTRICULAR RESPONSE: Primary | ICD-10-CM

## 2025-07-29 DIAGNOSIS — Z79.01 ANTICOAGULATED ON COUMADIN: ICD-10-CM

## 2025-07-29 LAB
INR PPP: 2.3 (ref 0.91–1.09)
PROTHROMBIN TIME: 27.3 SECONDS (ref 10–13.8)

## 2025-07-29 PROCEDURE — G0463 HOSPITAL OUTPT CLINIC VISIT: HCPCS

## 2025-07-29 PROCEDURE — 85610 PROTHROMBIN TIME: CPT

## 2025-07-29 NOTE — PROGRESS NOTES
Anticoagulation Clinic Progress Note    Anticoagulation Summary  As of 2025      INR goal:  2.0-3.0   TTR:  60.0% (5.4 y)   INR used for dosin.3 (2025)   Warfarin maintenance plan:  9 mg every Tue; 6 mg all other days   Weekly warfarin total:  45 mg   No change documented:  Rochelle Meyers   Plan last modified:  Rani Dhillon, [facility] (2021)   Next INR check:  2025   Priority:  High   Target end date:  Indefinite    Indications    Atrial fibrillation with rapid ventricular response [I48.91]  Anticoagulated on Coumadin [Z79.01]                 Anticoagulation Episode Summary       INR check location:  --    Preferred lab:  --    Send INR reminders to:  [facility] [place]    Comments:  Afib + left atrial appendage thrombus at present; pending cardioversion upon resolution of CARRI thrombus.          Anticoagulation Care Providers       Provider Role Specialty Phone number    Gavin Luke MD Referring Cardiology [phone]            Clinic Interview:  Patient Findings     Positives:  Change in health, Change in medications    Negatives:  Signs/symptoms of thrombosis, Signs/symptoms of bleeding,   Laboratory test error suspected, Change in alcohol use, Change in   activity, Upcoming invasive procedure, Emergency department visit,   Upcoming dental procedure, Missed doses, Extra doses, Change in   diet/appetite, Hospital admission, Bruising, Other complaints    Comments:  Patient reported having gout a wk prior to visit, still having   symptoms, took 5 days of prednisone      Clinical Outcomes     Negatives:  Major bleeding event, Thromboembolic event,   Anticoagulation-related hospital admission, Anticoagulation-related ED   visit, Anticoagulation-related fatality    Comments:  Patient reported having gout a wk prior to visit, still having   symptoms, took 5 days of prednisone        INR History:      2024     8:45 AM 2024     9:45 AM 2024    10:00 AM  11/5/2024    10:15 AM 3/20/2025    10:15 AM 4/3/2025    11:00 AM 7/29/2025     9:45 AM   Anticoagulation Monitoring   INR 2.6 2.0 2.9 2.3 1.3 1.6 2.3   INR Date 7/25/2024 8/13/2024 9/23/2024 11/5/2024 3/20/2025 4/3/2025 7/29/2025   INR Goal 2.0-3.0 2.0-3.0 2.0-3.0 2.0-3.0 2.0-3.0 2.0-3.0 2.0-3.0   Trend Same Same Same Same Same Same Same   Last Week Total 45 mg 45 mg 45 mg 45 mg 33 mg 42 mg 45 mg   Next Week Total 45 mg 15 mg 45 mg 45 mg 51 mg 51 mg 45 mg   Sun 6 mg Hold (8/18); Otherwise 6 mg 6 mg 6 mg 6 mg 6 mg 6 mg   Mon 6 mg Hold (8/19) 6 mg 6 mg 6 mg 6 mg 6 mg   Tue 9 mg 9 mg 9 mg 9 mg 9 mg 9 mg 9 mg   Wed 6 mg 9 mg (8/21); Otherwise 6 mg 6 mg 6 mg 6 mg 6 mg 6 mg   Thu 6 mg Hold (8/15), 9 mg (8/22) 6 mg 6 mg 9 mg (3/20); Otherwise 6 mg 9 mg (4/3); Otherwise 6 mg 6 mg   Fri 6 mg Hold (8/16); Otherwise 6 mg 6 mg 6 mg 9 mg (3/21); Otherwise 6 mg 9 mg (4/4); Otherwise 6 mg 6 mg   Sat 6 mg Hold (8/17); Otherwise 6 mg 6 mg 6 mg 6 mg 6 mg 6 mg       Plan:  1. INR is therapeutic today- see above in Anticoagulation Summary.   Will instruct Milton Moody to continue their warfarin regimen- see above in Anticoagulation Summary.  2. Follow up in 2 weeks.  3. Patient declines warfarin refills.  4. Verbal and written information provided. Patient expresses understanding and has no further questions at this time.    Rochelle Meyers

## 2025-07-29 NOTE — PROGRESS NOTES
I have supervised and reviewed the notes, assessments, and/or procedures performed. I personally performed the assessment and implemented the plan. I concur with the documentation of this patient encounter.    Jessica Talbot, [facility]

## 2025-08-06 ENCOUNTER — TELEPHONE (OUTPATIENT)
Dept: CARDIOLOGY | Facility: CLINIC | Age: 60
End: 2025-08-06

## 2025-08-12 ENCOUNTER — ANTICOAGULATION VISIT (OUTPATIENT)
Dept: PHARMACY | Facility: HOSPITAL | Age: 60
End: 2025-08-12
Payer: COMMERCIAL

## 2025-08-12 DIAGNOSIS — I48.91 ATRIAL FIBRILLATION WITH RAPID VENTRICULAR RESPONSE: Primary | ICD-10-CM

## 2025-08-12 DIAGNOSIS — Z79.01 ANTICOAGULATED ON COUMADIN: ICD-10-CM

## 2025-08-12 LAB
INR PPP: 2.4 (ref 0.91–1.09)
PROTHROMBIN TIME: 28.8 SECONDS (ref 10–13.8)

## 2025-08-12 PROCEDURE — G0463 HOSPITAL OUTPT CLINIC VISIT: HCPCS

## 2025-08-12 PROCEDURE — 85610 PROTHROMBIN TIME: CPT
